# Patient Record
Sex: FEMALE | Race: WHITE | Employment: OTHER | URBAN - METROPOLITAN AREA
[De-identification: names, ages, dates, MRNs, and addresses within clinical notes are randomized per-mention and may not be internally consistent; named-entity substitution may affect disease eponyms.]

---

## 2017-01-06 ENCOUNTER — OFFICE VISIT (OUTPATIENT)
Dept: INTERNAL MEDICINE CLINIC | Age: 72
End: 2017-01-06

## 2017-01-06 VITALS
DIASTOLIC BLOOD PRESSURE: 75 MMHG | WEIGHT: 157 LBS | RESPIRATION RATE: 16 BRPM | HEART RATE: 78 BPM | HEIGHT: 59 IN | SYSTOLIC BLOOD PRESSURE: 134 MMHG | OXYGEN SATURATION: 96 % | TEMPERATURE: 98.4 F | BODY MASS INDEX: 31.65 KG/M2

## 2017-01-06 DIAGNOSIS — E78.1 PURE HYPERGLYCERIDEMIA: ICD-10-CM

## 2017-01-06 DIAGNOSIS — M26.609 TMJ (TEMPOROMANDIBULAR JOINT SYNDROME): ICD-10-CM

## 2017-01-06 DIAGNOSIS — I10 ESSENTIAL HYPERTENSION: Primary | ICD-10-CM

## 2017-01-06 RX ORDER — CYCLOBENZAPRINE HCL 5 MG
5 TABLET ORAL
Qty: 30 TAB | Refills: 0 | Status: SHIPPED | OUTPATIENT
Start: 2017-01-06 | End: 2018-02-16

## 2017-01-06 RX ORDER — CYCLOBENZAPRINE HCL 5 MG
5 TABLET ORAL
Qty: 30 TAB | Refills: 0 | Status: SHIPPED | OUTPATIENT
Start: 2017-01-06 | End: 2017-01-06 | Stop reason: SDUPTHER

## 2017-01-06 NOTE — PATIENT INSTRUCTIONS
Temporomandibular Disorder: Care Instructions  Your Care Instructions    Temporomandibular (TM) disorders are a problem with the muscles and joints that connect your jaw to your skull. They cause pain when you open your mouth, chew, or yawn. You may feel this pain on one or both sides. TM disorders are often caused by tight jaw muscles. The tightness can be caused by clenching or grinding your teeth. This may happen when you have a lot of stress in your life. If you lower your stress, you may be able to stop clenching or grinding your teeth. This will help relax your jaw and reduce your pain. You may also be able to do some things at home to feel better. But if none of this works, your doctor may prescribe medicine to help relax your muscles and control the pain. Follow-up care is a key part of your treatment and safety. Be sure to make and go to all appointments, and call your doctor if you are having problems. It's also a good idea to know your test results and keep a list of the medicines you take. How can you care for yourself at home? · Put a warm, moist cloth or heating pad set on low on your jaw. Do this for 10 to 20 minutes at a time. Put a thin cloth between the heating pad and your skin. · Avoid hard or chewy foods that cause your jaws to work very hard. Examples include popcorn, jerky, tough meats, chewy breads, gum, and raw apples and carrots. · Choose softer foods that are easy to chew. These include eggs, yogurt, and soup. · Cut your food into small pieces. Chew slowly. · If your jaw gets too painful to chew, or if it locks, you may need to puree your food for a few days or weeks. · To relax your jaw, repeat this exercise for a few minutes every morning and evening. Watch yourself in a mirror. Gently open and close your mouth. Move your jaw straight up and down. But don't do this if it makes your pain worse. · Get at least 30 minutes of exercise on most days of the week to relieve stress. Walking is a good choice. You also may want to do other activities, such as running, swimming, cycling, or playing tennis or team sports. · Do not:  ¨ Hold a phone between your shoulder and your jaw. ¨ Open your mouth all the way, like when you sing loudly or yawn. ¨ Clench or grind your teeth, bite your lips, or chew your fingernails. ¨ Clench things such as pens, pipes, or cigars between your teeth. When should you call for help? Call your doctor now or seek immediate medical care if:  · Your jaw is locked open or shut or it is hard to move your jaw. Watch closely for changes in your health, and be sure to contact your doctor if:  · Your jaw pain gets worse. · Your face is swollen. · You do not get better as expected. Where can you learn more? Go to http://bennett-sangita.info/. Enter S749 in the search box to learn more about \"Temporomandibular Disorder: Care Instructions. \"  Current as of: August 9, 2016  Content Version: 11.1  © 0066-9166 [employer], Incorporated. Care instructions adapted under license by [employer] (which disclaims liability or warranty for this information). If you have questions about a medical condition or this instruction, always ask your healthcare professional. [address] any warranty or liability for your use of this information.

## 2017-01-06 NOTE — MR AVS SNAPSHOT
Visit Information Date & Time Provider Department Dept. Phone Encounter #  
 1/6/2017 11:00 AM [facility] [phone] [id number] Your Appointments 6/14/2017 10:40 AM  
Follow Up with MD [facility] [address] [facility]) Appt Note: 6 mo F/U KU 12/14 [address] [facility] [address]  
[phone]  
  
   
 [address] [address] Upcoming Health Maintenance Date Due FOBT Q 1 YEAR AGE 50-75 11/21/1996 GLAUCOMA SCREENING Q2Y 11/21/2011 Pneumococcal 65+ Low/Medium Risk (2 of 2 - PPSV23) 9/21/2017 MEDICARE YEARLY EXAM 9/22/2017 BREAST CANCER SCRN MAMMOGRAM 3/31/2018 DTaP/Tdap/Td series (2 - Td) 11/17/2024 Allergies as of 1/6/2017  Review Complete On: 1/6/2017 By: Seth Ramos MD  
  
 Severity Noted Reaction Type Reactions Prednisone  11/09/2016    Other (comments) Shakiness and headache Current Immunizations  Never Reviewed Name Date Tdap 11/17/2014 Not reviewed this visit You Were Diagnosed With   
  
 Codes Comments Essential hypertension    -  Primary ICD-10-CM: I10 
ICD-9-CM: 401.9 Pure hyperglyceridemia     ICD-10-CM: E78.1 ICD-9-CM: 272.1 TMJ (temporomandibular joint syndrome)     ICD-10-CM: W21.965 ICD-9-CM: 524.60 Vitals BP Pulse Temp Resp Height(growth percentile) Weight(growth percentile) 134/75 (BP 1 Location: Right arm, BP Patient Position: Sitting) 78 98.4 °F (36.9 °C) (Oral) 16 4' 11\" (1.499 m) 157 lb (71.2 kg) SpO2 BMI OB Status Smoking Status 96% 31.71 kg/m2 Postmenopausal Current Every Day Smoker Vitals History BMI and BSA Data Body Mass Index Body Surface Area 31.71 kg/m 2 1.72 m 2 Preferred Pharmacy Pharmacy Name Phone  [address], [address] [phone] Your Updated Medication List  
  
   
This list is accurate as of: 1/6/17 11:50 AM.  Always use your most recent med list.  
  
  
  
  
 albuterol 90 mcg/actuation inhaler Commonly known as:  PROAIR HFA Take 1 Puff by inhalation every four (4) hours as needed for Wheezing or Shortness of Breath. cyclobenzaprine 5 mg tablet Commonly known as:  FLEXERIL Take 1 Tab by mouth three (3) times daily as needed for Muscle Spasm(s). dilTIAZem  mg ER capsule Commonly known as:  CARTIA XT Take 1 Cap by mouth daily. lamoTRIgine 100 mg tablet Commonly known as: LaMICtal  
Take 1 Tab by mouth two (2) times a day. simvastatin 20 mg tablet Commonly known as:  ZOCOR  
TAKE 1 TABLET BY MOUTH NIGHTLY Prescriptions Sent to Pharmacy Refills  
 cyclobenzaprine (FLEXERIL) 5 mg tablet 0 Sig: Take 1 Tab by mouth three (3) times daily as needed for Muscle Spasm(s). Class: Normal  
 Pharmacy: [address] [place] [address] #: [phone] Route: Oral  
  
Patient Instructions Temporomandibular Disorder: Care Instructions Your Care Instructions Temporomandibular (TM) disorders are a problem with the muscles and joints that connect your jaw to your skull. They cause pain when you open your mouth, chew, or yawn. You may feel this pain on one or both sides. TM disorders are often caused by tight jaw muscles. The tightness can be caused by clenching or grinding your teeth. This may happen when you have a lot of stress in your life. If you lower your stress, you may be able to stop clenching or grinding your teeth. This will help relax your jaw and reduce your pain. You may also be able to do some things at home to feel better. But if none of this works, your doctor may prescribe medicine to help relax your muscles and control the pain. Follow-up care is a key part of your treatment and safety. Be sure to make and go to all appointments, and call your doctor if you are having problems. It's also a good idea to know your test results and keep a list of the medicines you take. How can you care for yourself at home? · Put a warm, moist cloth or heating pad set on low on your jaw. Do this for 10 to 20 minutes at a time. Put a thin cloth between the heating pad and your skin. · Avoid hard or chewy foods that cause your jaws to work very hard. Examples include popcorn, jerky, tough meats, chewy breads, gum, and raw apples and carrots. · Choose softer foods that are easy to chew. These include eggs, yogurt, and soup. · Cut your food into small pieces. Chew slowly. · If your jaw gets too painful to chew, or if it locks, you may need to puree your food for a few days or weeks. · To relax your jaw, repeat this exercise for a few minutes every morning and evening. Watch yourself in a mirror. Gently open and close your mouth. Move your jaw straight up and down. But don't do this if it makes your pain worse. · Get at least 30 minutes of exercise on most days of the week to relieve stress. Walking is a good choice. You also may want to do other activities, such as running, swimming, cycling, or playing tennis or team sports. · Do not: 
¨ Hold a phone between your shoulder and your jaw. ¨ Open your mouth all the way, like when you sing loudly or yawn. ¨ Clench or grind your teeth, bite your lips, or chew your fingernails. ¨ Clench things such as pens, pipes, or cigars between your teeth. When should you call for help? Call your doctor now or seek immediate medical care if: 
· Your jaw is locked open or shut or it is hard to move your jaw. Watch closely for changes in your health, and be sure to contact your doctor if: 
· Your jaw pain gets worse. · Your face is swollen. · You do not get better as expected. Where can you learn more? Go to http://bennett-sangita.info/. Enter Q782 in the search box to learn more about \"Temporomandibular Disorder: Care Instructions. \" Current as of: August 9, 2016 Content Version: 11.1 © 4115-9049 [employer]. Care instructions adapted under license by [facility] (which disclaims liability or warranty for this information). If you have questions about a medical condition or this instruction, always ask your healthcare professional. [address] any warranty or liability for your use of this information. Introducing [facility]! [employer] introduces [facility] patient portal. Now you can access parts of your medical record, email your doctor's office, and request medication refills online. 1. In your internet browser, go to [email or web address] 2. Click on the First Time User? Click Here link in the Sign In box. You will see the New Member Sign Up page. 3. Enter your [facility] Access Code exactly as it appears below. You will not need to use this code after youve completed the sign-up process. If you do not sign up before the expiration date, you must request a new code. · [facility] Access Code: [email or web address] Expires: 4/6/2017 11:50 AM 
 
4. Enter the last four digits of your Social Security Number (xxxx) and Date of Birth (mm/dd/yyyy) as indicated and click Submit. You will be taken to the next sign-up page. 5. Create a Synk[employer] ID. This will be your [facility] login ID and cannot be changed, so think of one that is secure and easy to remember. 6. Create a [facility] password. You can change your password at any time. 7. Enter your Password Reset Question and Answer. This can be used at a later time if you forget your password. 8. Enter your e-mail address. You will receive e-mail notification when new information is available in [address] 9. Click Sign Up.  You can now view and download portions of your medical record. 10. Click the Download Summary menu link to download a portable copy of your medical information. If you have questions, please visit the Frequently Asked Questions section of the [employer] website. Remember, [employer] is NOT to be used for urgent needs. For medical emergencies, dial 911. Now available from your iPhone and Android! Please provide this summary of care documentation to your next provider. Your primary care clinician is listed as Dee Astorga. If you have any questions after today's visit, please call [phone].

## 2017-01-06 NOTE — PROGRESS NOTES
HISTORY OF PRESENT ILLNESS  Thomas Coats is a 80 y.o. female. HPI   Cardiovascular Review  The patient has hypertension and hyperlipidemia. She reports taking medications as instructed, no medication side effects noted, no dyspnea on exertion, no swelling of ankles, no palpitations, no muscle aches or pain. Diet and Lifestyle: generally follows a low fat low cholesterol diet, generally follows a low sodium diet, sedentary, smoker she is a current smoker. Lab review: most recent lipid panel reviewed, showing LDL result meets goal, labs reviewed and discussed with patient. She went to the dentist and had left sided jaw pain last week. She was told to apply warm compresses to her jaw. She notes that the discomfort has continued. She notes that she has previously clenched her teeth when sleeping. She has done this for many years. Patient Active Problem List   Diagnosis Code    HTN (hypertension) I10    Tachyarrhythmia R00.0    Mitral valve prolapse I34.1    Hyperlipidemia E78.5    Obesity (BMI 30.0-34. 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 Authorizing Provider   cyclobenzaprine (FLEXERIL) 5 mg tablet Take 1 Tab by mouth three (3) times daily as needed for Muscle Spasm(s). Indications: MUSCLE SPASM 1/6/17  Yes Huber Dsouza MD   lamoTRIgine (LAMICTAL) 100 mg tablet Take 1 Tab by mouth two (2) times a day. 12/14/16  Yes Eliza Dasilva MD   simvastatin (ZOCOR) 20 mg tablet TAKE 1 TABLET BY MOUTH NIGHTLY 12/4/16  Yes Huber Dsouza MD   diltiazem CD (CARTIA XT) 120 mg ER capsule Take 1 Cap by mouth daily. 8/5/16  Yes Huber Dsouza MD   albuterol [facility] HFA) 90 mcg/actuation inhaler Take 1 Puff by inhalation every four (4) hours as needed for Wheezing or Shortness of Breath. 8/5/16  Yes Huber Dsouza MD       Review of Systems   Constitutional: Negative.     HENT:        Jaw pain as per HPI Respiratory: Negative. Cardiovascular: Negative. Genitourinary: Negative. Visit Vitals    /75 (BP 1 Location: Right arm, BP Patient Position: Sitting)    Pulse 78    Temp 98.4 °F (36.9 °C) (Oral)    Resp 16    Ht 4' 11\" (1.499 m)    Wt 157 lb (71.2 kg)    SpO2 96%    BMI 31.71 kg/m2       Physical Exam   Constitutional: She appears well-developed and well-nourished. No distress. HENT:   Head:       Tenderness and mild swelling noted at the left mandibular joint. Cardiovascular: Normal rate, regular rhythm and normal heart sounds. Pulmonary/Chest: Effort normal and breath sounds normal.       ASSESSMENT and PLAN  Caitlin Oden was seen today for hypertension. Diagnoses and all orders for this visit:    Essential hypertension    Pure hyperglyceridemia    TMJ (temporomandibular joint syndrome)  -     cyclobenzaprine (FLEXERIL) 5 mg tablet; Take 1 Tab by mouth three (3) times daily as needed for Muscle Spasm(s). Indications: MUSCLE SPASM       Advised the patient to call back or return to office if symptoms worsen/change/persist.   Discussed expected course/resolution/complications of diagnosis in detail with patient. Medication risks/benefits/costs/interactions/alternatives discussed with patient. The patient was given an after visit summary which includes diagnoses, current medications, & vitals. They expressed understanding with the diagnosis and plan.

## 2017-01-31 DIAGNOSIS — E78.2 MIXED HYPERLIPIDEMIA: ICD-10-CM

## 2017-01-31 RX ORDER — DILTIAZEM HYDROCHLORIDE 120 MG/1
CAPSULE, EXTENDED RELEASE ORAL
Qty: 90 CAP | Refills: 1 | Status: SHIPPED | OUTPATIENT
Start: 2017-01-31 | End: 2017-07-26 | Stop reason: SDUPTHER

## 2017-01-31 RX ORDER — SIMVASTATIN 20 MG/1
TABLET, FILM COATED ORAL
Qty: 90 TAB | Refills: 1 | Status: SHIPPED | OUTPATIENT
Start: 2017-01-31 | End: 2017-07-26 | Stop reason: SDUPTHER

## 2017-03-05 ENCOUNTER — IP HISTORICAL/CONVERTED ENCOUNTER (OUTPATIENT)
Dept: OTHER | Age: 72
End: 2017-03-05

## 2017-03-09 ENCOUNTER — TELEPHONE (OUTPATIENT)
Dept: INTERNAL MEDICINE CLINIC | Age: 72
End: 2017-03-09

## 2017-03-09 NOTE — TELEPHONE ENCOUNTER
Pt was in [address] [place] [address] for 3 days and would like to discuss with the DR the med's she was given.  She would like to talk to [facility] and see what he thinks about what the DR said to her

## 2017-03-09 NOTE — TELEPHONE ENCOUNTER
Patient calls back to inform she was in [address] [place] [address] 2 days ago and was kept overnight. She was given an rx for Symbicort, Prednisone which causes shakes, given  Albuterol / neb treatment rx . Patient will need clarification on medications.  Pt scheduled for tomorrow

## 2017-03-10 ENCOUNTER — OFFICE VISIT (OUTPATIENT)
Dept: INTERNAL MEDICINE CLINIC | Age: 72
End: 2017-03-10

## 2017-03-10 VITALS
WEIGHT: 163.4 LBS | HEIGHT: 59 IN | HEART RATE: 65 BPM | SYSTOLIC BLOOD PRESSURE: 140 MMHG | OXYGEN SATURATION: 95 % | BODY MASS INDEX: 32.94 KG/M2 | TEMPERATURE: 98.9 F | RESPIRATION RATE: 18 BRPM | DIASTOLIC BLOOD PRESSURE: 90 MMHG

## 2017-03-10 DIAGNOSIS — J44.9 CHRONIC OBSTRUCTIVE PULMONARY DISEASE, UNSPECIFIED COPD TYPE (HCC): Primary | ICD-10-CM

## 2017-03-10 RX ORDER — METHYLPREDNISOLONE 4 MG/1
4 TABLET ORAL
Qty: 1 DOSE PACK | Refills: 0 | Status: SHIPPED | OUTPATIENT
Start: 2017-03-10 | End: 2017-04-05 | Stop reason: ALTCHOICE

## 2017-03-10 RX ORDER — METHYLPREDNISOLONE 4 MG/1
4 TABLET ORAL
Qty: 1 DOSE PACK | Refills: 0 | Status: SHIPPED | OUTPATIENT
Start: 2017-03-10 | End: 2017-03-10 | Stop reason: CLARIF

## 2017-03-10 NOTE — PROGRESS NOTES
Follow Up Visit    Yarelis Reno is a 80 y.o. female. she presents for Hospital Follow Up    Transition Care Management:    Admission: 3/4/17  Discharge: 3/7/17  Location: [facility]  Diagnosis:  COPD exacerbation    The patient presents for follow up from a recent hospitalization. She developed an exacerbation of COPD at that time. Given IV solumedrol and nebulizer therapy in the hospital.  This was eventually transitioned to oral prednisone, Dulera and Symbicort. She was discharged to home with these medications in prescription but she did not obtain them nor has she used any of the medications. She has felt stable since discharge with mild cough and sputum production. Otherwise, she comes to discuss further management. Patient Active Problem List   Diagnosis Code    HTN (hypertension) I10    Tachyarrhythmia R00.0    Mitral valve prolapse I34.1    Hyperlipidemia E78.5    Obesity (BMI 30.0-34. 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 Authorizing Provider   simvastatin (ZOCOR) 20 mg tablet TAKE 1 TABLET EVERY NIGHT 1/31/17  Yes Justin Hoskins MD   CARTIA  mg ER capsule TAKE 1 CAPSULE EVERY DAY 1/31/17  Yes Justin Hoskins MD   lamoTRIgine (LAMICTAL) 100 mg tablet Take 1 Tab by mouth two (2) times a day. 12/14/16  Yes Hamida Lazar MD   albuterol (PROAIR HFA) 90 mcg/actuation inhaler Take 1 Puff by inhalation every four (4) hours as needed for Wheezing or Shortness of Breath. 8/5/16  Yes Justin Hoskins MD   cyclobenzaprine (FLEXERIL) 5 mg tablet Take 1 Tab by mouth three (3) times daily as needed for Muscle Spasm(s). Indications:  MUSCLE SPASM 1/6/17   Justin Hoskins MD         Health Maintenance   Topic Date Due    FOBT Q 1 YEAR AGE 50-75  11/21/1996    GLAUCOMA SCREENING Q2Y  11/21/2011    Pneumococcal 65+ Low/Medium Risk (2 of 2 - PPSV23) 09/21/2017    MEDICARE YEARLY EXAM 09/22/2017    BREAST CANCER SCRN MAMMOGRAM  03/31/2018    DTaP/Tdap/Td series (2 - Td) 11/17/2024    Hepatitis C Screening  Completed    OSTEOPOROSIS SCREENING (DEXA)  Completed    ZOSTER VACCINE AGE 60>  Addressed    INFLUENZA AGE 9 TO ADULT  Addressed       Review of Systems   Constitutional: Negative for malaise/fatigue. Respiratory: Positive for cough and sputum production. Negative for shortness of breath and wheezing. Visit Vitals    /90 (BP 1 Location: Right arm, BP Patient Position: Sitting)    Pulse 65    Temp 98.9 °F (37.2 °C) (Oral)    Resp 18    Ht 4' 11\" (1.499 m)    Wt 163 lb 6.4 oz (74.1 kg)    SpO2 95%    BMI 33 kg/m2       Physical Exam   Constitutional: No distress. Cardiovascular: Normal rate and regular rhythm. Pulmonary/Chest: Effort normal and breath sounds normal. She has no wheezes. ASSESSMENT/PLAN    Jenny Alicea was seen today for hospital follow up. Diagnoses and all orders for this visit:    Chronic obstructive pulmonary disease, unspecified COPD type ([facility] [address]- Discussed with the patient to begin [facility] and to add a Medrol taper. She will monitor her breathing and return if not continuing to improve. She has also decreased her smoking significantly. -     mometasone-formoterol (DULERA) 100-5 mcg/actuation HFA inhaler; Take 2 Puffs by inhalation two (2) times a day. -     methylPREDNISolone (MEDROL DOSEPACK) 4 mg tablet; Take 1 Tab by mouth Specific Days and Specific Times. Follow-up Disposition:  Return in about 3 months (around 6/10/2017) for Follow up.

## 2017-03-10 NOTE — MR AVS SNAPSHOT
Visit Information Date & Time Provider Department Dept. Phone Encounter #  
 3/10/2017 10:15 AM [facility] [phone] [id number] Follow-up Instructions Return in about 3 months (around 6/10/2017) for Follow up. Your Appointments 5/5/2017 11:00 AM  
ROUTINE CARE with Joy Irwin MD  
[place] [place] [address] Internal Medicine [facility]) Appt Note: 4-month f/u  
 [address] [facility] access hospital [address]  
[address] [address] [facility] [address] [place] [address]  
  
    
 6/14/2017 10:40 AM  
Follow Up with Elena Winslow MD  
[facility] at [facility] [address] [place] [address]) Appt Note: 6 mo F/U KU 12/14 [address] [facility] [address]  
[phone]  
  
   
 [address] [address] Upcoming Health Maintenance Date Due FOBT Q 1 YEAR AGE 50-75 11/21/1996 GLAUCOMA SCREENING Q2Y 11/21/2011 Pneumococcal 65+ Low/Medium Risk (2 of 2 - PPSV23) 9/21/2017 MEDICARE YEARLY EXAM 9/22/2017 BREAST CANCER SCRN MAMMOGRAM 3/31/2018 DTaP/Tdap/Td series (2 - Td) 11/17/2024 Allergies as of 3/10/2017  Review Complete On: 3/10/2017 By: Joy Irwin MD  
  
 Severity Noted Reaction Type Reactions Prednisone  11/09/2016    Other (comments) Shakiness and headache Current Immunizations  Never Reviewed Name Date Tdap 11/17/2014 Not reviewed this visit You Were Diagnosed With   
  
 Codes Comments Chronic obstructive pulmonary disease, unspecified COPD type ([facility]    -  Primary ICD-10-CM: J44.9 ICD-9-CM: 327 Vitals BP Pulse Temp Resp Height(growth percentile) Weight(growth percentile) 140/90 (BP 1 Location: Right arm, BP Patient Position: Sitting) 65 98.9 °F (37.2 °C) (Oral) 18 4' 11\" (1.499 m) 163 lb 6.4 oz (74.1 kg) SpO2 BMI OB Status Smoking Status 95% 33 kg/m2 Postmenopausal Current Every Day Smoker BMI and BSA Data Body Mass Index Body Surface Area  
 33 kg/m 2 1.76 m 2 Preferred Pharmacy Pharmacy Name Phone [id number], [address] [place] [address] [phone] Your Updated Medication List  
  
   
This list is accurate as of: 3/10/17 10:39 AM.  Always use your most recent med list.  
  
  
  
  
 albuterol 90 mcg/actuation inhaler Commonly known as:  PROAIR HFA Take 1 Puff by inhalation every four (4) hours as needed for Wheezing or Shortness of Breath. CARTIA  mg ER capsule Generic drug:  dilTIAZem CD  
TAKE 1 CAPSULE EVERY DAY  
  
 cyclobenzaprine 5 mg tablet Commonly known as:  FLEXERIL Take 1 Tab by mouth three (3) times daily as needed for Muscle Spasm(s). Indications: MUSCLE SPASM  
  
 lamoTRIgine 100 mg tablet Commonly known as: LaMICtal  
Take 1 Tab by mouth two (2) times a day. methylPREDNISolone 4 mg tablet Commonly known as:  Seferino Glee Take 1 Tab by mouth Specific Days and Specific Times. mometasone-formoterol 100-5 mcg/actuation HFA inhaler Commonly known as:  Clora Schwab Take 2 Puffs by inhalation two (2) times a day. simvastatin 20 mg tablet Commonly known as:  ZOCOR  
TAKE 1 TABLET EVERY NIGHT Prescriptions Sent to Pharmacy Refills  
 mometasone-formoterol (DULERA) 100-5 mcg/actuation HFA inhaler 3 Sig: Take 2 Puffs by inhalation two (2) times a day. Class: Normal  
 Pharmacy: [address] [place] [address] Ph #: [phone] Route: Inhalation  
 methylPREDNISolone (MEDROL DOSEPACK) 4 mg tablet 0 Sig: Take 1 Tab by mouth Specific Days and Specific Times. Class: Normal  
 Pharmacy: [address] [place] [address] Ph #: [phone] Route: Oral  
  
Follow-up Instructions Return in about 3 months (around 6/10/2017) for Follow up. Patient Instructions Chronic Obstructive Pulmonary Disease (COPD) Flare-Ups: Care Instructions Your Care Instructions Chronic obstructive pulmonary disease (COPD) is a lung disease that makes it hard to breathe. It is caused by damage to the lungs over many years, usually from smoking. COPD is often a mix of two diseases: · Chronic bronchitis: The airways that carry air to the lungs (bronchial tubes) get inflamed and make a lot of mucus. This can narrow or block the airways. · Emphysema: In a healthy person, the tiny air sacs in the lungs are like balloons. As you breathe in and out, they get bigger and smaller to move air through your lungs. But with emphysema, these air sacs are damaged and lose their stretch. Less air gets in and out of the lungs. Many people with COPD have attacks called flare-ups or exacerbations. This is when your usual symptoms quickly get worse and stay worse. The doctor has checked you carefully. But problems can develop later. If you notice any problems or new symptoms, get medical treatment right away. Follow-up care is a key part of your treatment and safety. Be sure to make and go to all appointments, and call your doctor if you are having problems. It's also a good idea to know your test results and keep a list of the medicines you take. How can you care for yourself at home? · Be safe with medicines. Take your medicines exactly as prescribed. Call your doctor if you think you are having a problem with your medicine. You may be taking medicines such as: ¨ Bronchodilators. These help open your airways and make breathing easier. ¨ Corticosteroids. These reduce airway inflammation. They may be given as pills, in a vein, or in an inhaled form. You may go home with pills in addition to an inhaler that you already use. · A spacer may help you get more inhaled medicine to your lungs. Ask your doctor or pharmacist if a spacer is right for you.  If it is, ask how to use it properly. · If your doctor prescribed antibiotics, take them as directed. Do not stop taking them just because you feel better. You need to take the full course of antibiotics. · If your doctor prescribed oxygen, use the flow rate your doctor has recommended. Do not change it without talking to your doctor first. 
· Do not smoke. Smoking makes COPD worse. If you need help quitting, talk to your doctor about stop-smoking programs and medicines. These can increase your chances of quitting for good. When should you call for help? Call 911 anytime you think you may need emergency care. For example, call if: 
· You have severe trouble breathing. Call your doctor now or seek immediate medical care if: 
· You have new or worse trouble breathing. · Your coughing or wheezing gets worse. · You cough up dark brown or bloody mucus (sputum). · You have a new or higher fever. Watch closely for changes in your health, and be sure to contact your doctor if: 
· You notice more mucus or a change in the color of your mucus. · You need to use your antibiotic or steroid pills. · You do not get better as expected. Where can you learn more? Go to http://bennett-sangita.info/. Enter N269 in the search box to learn more about \"Chronic Obstructive Pulmonary Disease (COPD) Flare-Ups: Care Instructions. \" Current as of: July 21, 2016 Content Version: 11.1 © 0599-9860 [employer]. Care instructions adapted under license by [employer] (which disclaims liability or warranty for this information). If you have questions about a medical condition or this instruction, always ask your healthcare professional. [place] [address] any warranty or liability for your use of this information. Introducing [facility]!    
 [facility] introduces [employer] patient portal. Now you can access parts of your medical record, email your doctor's office, and request medication refills online. 1. In your internet browser, go to [email or web address] 2. Click on the First Time User? Click Here link in the Sign In box. You will see the New Member Sign Up page. 3. Enter your [employer] Access Code exactly as it appears below. You will not need to use this code after youve completed the sign-up process. If you do not sign up before the expiration date, you must request a new code. · [employer] Access Code: [email or web address] Expires: 4/6/2017 11:50 AM 
 
4. Enter the last four digits of your Social Security Number (xxxx) and Date of Birth (mm/dd/yyyy) as indicated and click Submit. You will be taken to the next sign-up page. 5. Create a [employer] ID. This will be your [employer] login ID and cannot be changed, so think of one that is secure and easy to remember. 6. Create a [employer] password. You can change your password at any time. 7. Enter your Password Reset Question and Answer. This can be used at a later time if you forget your password. 8. Enter your e-mail address. You will receive e-mail notification when new information is available in [address] 9. Click Sign Up. You can now view and download portions of your medical record. 10. Click the Download Summary menu link to download a portable copy of your medical information. If you have questions, please visit the Frequently Asked Questions section of the [employer] website. Remember, [employer] is NOT to be used for urgent needs. For medical emergencies, dial 911. Now available from your iPhone and Android! Please provide this summary of care documentation to your next provider. Your primary care clinician is listed as Shanta Saini. If you have any questions after today's visit, please call [phone].

## 2017-03-10 NOTE — PROGRESS NOTES
Patient was admitted last week Sunday and Discharged on Wednesday 3/8 from [facility] for SOB. Patient states still feeling horrible; she continues to have cough, sob and head aches.

## 2017-03-15 ENCOUNTER — TELEPHONE (OUTPATIENT)
Dept: INTERNAL MEDICINE CLINIC | Age: 72
End: 2017-03-15

## 2017-03-15 NOTE — TELEPHONE ENCOUNTER
The patient still has a runny nose and cough The medication  Dulara is 400.00 a month can you call in something else ?

## 2017-03-17 ENCOUNTER — DOCUMENTATION ONLY (OUTPATIENT)
Dept: INTERNAL MEDICINE CLINIC | Age: 72
End: 2017-03-17

## 2017-03-28 ENCOUNTER — TELEPHONE (OUTPATIENT)
Dept: INTERNAL MEDICINE CLINIC | Age: 72
End: 2017-03-28

## 2017-03-28 NOTE — TELEPHONE ENCOUNTER
Spoke with pt who states she is currently taking Mucinex and still has the same symptoms as described in previous message

## 2017-03-28 NOTE — TELEPHONE ENCOUNTER
Patient is calling with an update. She still has a stuffy/runny nose. She is coughing up green/yellow mucus and it is keeping her up at night. No fever however she does not have some night sweats. Please advise.  If possible she would like a call before noon today at [phone]

## 2017-04-03 NOTE — TELEPHONE ENCOUNTER
Left message for pt to return call to inform per drs recommendations to schedule an appt to discuss possible need for Pulmonary referral

## 2017-04-04 ENCOUNTER — TELEPHONE (OUTPATIENT)
Dept: INTERNAL MEDICINE CLINIC | Age: 72
End: 2017-04-04

## 2017-04-05 ENCOUNTER — OFFICE VISIT (OUTPATIENT)
Dept: INTERNAL MEDICINE CLINIC | Age: 72
End: 2017-04-05

## 2017-04-05 VITALS
HEART RATE: 71 BPM | WEIGHT: 165.2 LBS | TEMPERATURE: 98.1 F | DIASTOLIC BLOOD PRESSURE: 90 MMHG | BODY MASS INDEX: 33.3 KG/M2 | OXYGEN SATURATION: 97 % | SYSTOLIC BLOOD PRESSURE: 128 MMHG | RESPIRATION RATE: 18 BRPM | HEIGHT: 59 IN

## 2017-04-05 DIAGNOSIS — R05.9 COUGH: Primary | ICD-10-CM

## 2017-04-05 RX ORDER — ALBUTEROL SULFATE 90 UG/1
1 AEROSOL, METERED RESPIRATORY (INHALATION)
Qty: 3 INHALER | Refills: 1 | Status: SHIPPED | OUTPATIENT
Start: 2017-04-05 | End: 2018-03-22 | Stop reason: SDUPTHER

## 2017-04-05 NOTE — PROGRESS NOTES
Acute Care Note    Little Wright is 80 y.o. female. she presents for evaluation of Nasal Congestion    COUGH  Wicho Mallory is here to talk about an ongoing cough. This is a follow up of a pre-existing problem problem. Symptoms began 2 weeks ago, gradually improving since that time. The cough is non-productive, with wheezing, with shortness of breath and is aggravated by reclining position. Associated symptoms include:change in voice. She denies: hemoptysis. She has tried tessalon as well as albuterol, and has been somewhat effective. There is a PMH significant for: significant cigarette smoking. Prior to Admission medications    Medication Sig Start Date End Date Taking? Authorizing Provider   albuterol (PROAIR HFA) 90 mcg/actuation inhaler Take 1 Puff by inhalation every four (4) hours as needed for Wheezing or Shortness of Breath. 4/5/17  Yes Daina Peabody, MD   mometasone-formoterol [facility]) 100-5 mcg/actuation HFA inhaler Take 2 Puffs by inhalation two (2) times a day. 3/10/17  Yes Daina Peabody, MD   simvastatin (ZOCOR) 20 mg tablet TAKE 1 TABLET EVERY NIGHT 1/31/17  Yes Daina Peabody, MD   CARTIA  mg ER capsule TAKE 1 CAPSULE EVERY DAY 1/31/17  Yes Daina Peabody, MD   lamoTRIgine (LAMICTAL) 100 mg tablet Take 1 Tab by mouth two (2) times a day. 12/14/16  Yes Los Hu MD   cyclobenzaprine (FLEXERIL) 5 mg tablet Take 1 Tab by mouth three (3) times daily as needed for Muscle Spasm(s). Indications: MUSCLE SPASM 1/6/17   Daina Peabody, MD         Patient Active Problem List   Diagnosis Code    HTN (hypertension) I10    Tachyarrhythmia R00.0    Mitral valve prolapse I34.1    Hyperlipidemia E78.5    Obesity (BMI 30.0-34. 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 Respiratory: Positive for cough. Negative for sputum production. Cardiovascular: Negative.           Visit Vitals    /90 (BP 1 Location: Right arm, BP Patient Position: Sitting)    Pulse 71    Temp 98.1 °F (36.7 °C) (Oral)    Resp 18    Ht 4' 11\" (1.499 m)    Wt 165 lb 3.2 oz (74.9 kg)    SpO2 97%    BMI 33.37 kg/m2       Physical Exam   Constitutional: She appears well-developed and well-nourished. Cardiovascular: Normal rate and regular rhythm. Pulmonary/Chest: Effort normal and breath sounds normal. No respiratory distress. She has no wheezes. ASSESSMENT/PLAN  Lisa Cohen was seen today for nasal congestion. Diagnoses and all orders for this visit:    Cough- Discussed with the patient. She is decreasing her smoking and improving. However I have discussed with her the need to involve pulmonology at this point to aid with future management. She is in agreement.   -     REFERRAL TO PULMONARY DISEASE  -     albuterol (PROAIR HFA) 90 mcg/actuation inhaler; Take 1 Puff by inhalation every four (4) hours as needed for Wheezing or Shortness of Breath. Advised the patient to call back or return to office if symptoms worsen/change/persist.   Discussed expected course/resolution/complications of diagnosis in detail with patient. Medication risks/benefits/costs/interactions/alternatives discussed with patient. The patient was given an after visit summary which includes diagnoses, current medications, & vitals. They expressed understanding with the diagnosis and plan.

## 2017-04-05 NOTE — PATIENT INSTRUCTIONS
Cough: Care Instructions  Your Care Instructions  A cough is your body's response to something that bothers your throat or airways. Many things can cause a cough. You might cough because of a cold or the flu, bronchitis, or asthma. Smoking, postnasal drip, allergies, and stomach acid that backs up into your throat also can cause coughs. A cough is a symptom, not a disease. Most coughs stop when the cause, such as a cold, goes away. You can take a few steps at home to cough less and feel better. Follow-up care is a key part of your treatment and safety. Be sure to make and go to all appointments, and call your doctor if you are having problems. It's also a good idea to know your test results and keep a list of the medicines you take. How can you care for yourself at home? · Drink lots of water and other fluids. This helps thin the mucus and soothes a dry or sore throat. Honey or lemon juice in hot water or tea may ease a dry cough. · Take cough medicine as directed by your doctor. · Prop up your head on pillows to help you breathe and ease a dry cough. · Try cough drops to soothe a dry or sore throat. Cough drops don't stop a cough. Medicine-flavored cough drops are no better than candy-flavored drops or hard candy. · Do not smoke. Avoid secondhand smoke. If you need help quitting, talk to your doctor about stop-smoking programs and medicines. These can increase your chances of quitting for good. When should you call for help? Call 911 anytime you think you may need emergency care. For example, call if:  · You have severe trouble breathing. Call your doctor now or seek immediate medical care if:  · You cough up blood. · You have new or worse trouble breathing. · You have a new or higher fever. · You have a new rash.   Watch closely for changes in your health, and be sure to contact your doctor if:  · You cough more deeply or more often, especially if you notice more mucus or a change in the color of your mucus. · You have new symptoms, such as a sore throat, an earache, or sinus pain. · You do not get better as expected. Where can you learn more? Go to http://bennett-sangita.info/. Enter D279 in the search box to learn more about \"Cough: Care Instructions. \"  Current as of: May 27, 2016  Content Version: 11.2  © 2589-7517 [employer]. Care instructions adapted under license by [employer] (which disclaims liability or warranty for this information). If you have questions about a medical condition or this instruction, always ask your healthcare professional. [address] any warranty or liability for your use of this information.

## 2017-04-05 NOTE — MR AVS SNAPSHOT
Visit Information Date & Time Provider Department Dept. Phone Encounter #  
 4/5/2017  2:45 PM [facility] [phone] [id number] Follow-up Instructions Return in about 3 months (around 7/5/2017) for Medicare Wellness Visit- 30 minute appointment. Your Appointments 6/12/2017 11:00 AM  
ROUTINE CARE with Antonino Montoya MD  
[facility]) Appt Note: 3mo f/u  
 [address]  [address] [facility] [address]  
[address] [id number] [facility] [address] [place] [address]  
  
    
 6/14/2017 10:40 AM  
Follow Up with Mario Alberto Otero MD  
[facility] at [address] [facility]) Appt Note: 6 mo F/U KU 12/14 [address] [facility] [address]  
[phone]  
  
   
 [address]  [address] Upcoming Health Maintenance Date Due FOBT Q 1 YEAR AGE 50-75 11/21/1996 GLAUCOMA SCREENING Q2Y 11/21/2011 Pneumococcal 65+ Low/Medium Risk (2 of 2 - PPSV23) 9/21/2017 MEDICARE YEARLY EXAM 9/22/2017 BREAST CANCER SCRN MAMMOGRAM 3/31/2018 DTaP/Tdap/Td series (2 - Td) 11/17/2024 Allergies as of 4/5/2017  Review Complete On: 4/5/2017 By: Lalo Given Severity Noted Reaction Type Reactions Prednisone  11/09/2016    Other (comments) Shakiness and headache Current Immunizations  Never Reviewed Name Date Tdap 11/17/2014 Not reviewed this visit You Were Diagnosed With   
  
 Codes Comments Cough    -  Primary ICD-10-CM: Z55 ICD-9-CM: 483. 2 Vitals BP Pulse Temp Resp Height(growth percentile) Weight(growth percentile) 128/90 (BP 1 Location: Right arm, BP Patient Position: Sitting) 71 98.1 °F (36.7 °C) (Oral) 18 4' 11\" (1.499 m) 165 lb 3.2 oz (74.9 kg) SpO2 BMI OB Status Smoking Status 97% 33.37 kg/m2 Postmenopausal Current Every Day Smoker BMI and BSA Data Body Mass Index Body Surface Area  
 33.37 kg/m 2 1.77 m 2 Preferred Pharmacy Pharmacy Name Phone [id number] [phone] Your Updated Medication List  
  
   
This list is accurate as of: 4/5/17  3:17 PM.  Always use your most recent med list.  
  
  
  
  
 albuterol 90 mcg/actuation inhaler Commonly known as:  PROAIR HFA Take 1 Puff by inhalation every four (4) hours as needed for Wheezing or Shortness of Breath. CARTIA  mg ER capsule Generic drug:  dilTIAZem CD  
TAKE 1 CAPSULE EVERY DAY  
  
 cyclobenzaprine 5 mg tablet Commonly known as:  FLEXERIL Take 1 Tab by mouth three (3) times daily as needed for Muscle Spasm(s). Indications: MUSCLE SPASM  
  
 lamoTRIgine 100 mg tablet Commonly known as: LaMICtal  
Take 1 Tab by mouth two (2) times a day. mometasone-formoterol 100-5 mcg/actuation HFA inhaler Commonly known as:  Tenna Cody Take 2 Puffs by inhalation two (2) times a day. simvastatin 20 mg tablet Commonly known as:  ZOCOR  
TAKE 1 TABLET EVERY NIGHT We Performed the Following REFERRAL TO PULMONARY DISEASE [BZD48 Custom] Comments:  
 Please evaluate patient for COPD Follow-up Instructions Return in about 3 months (around 7/5/2017) for Medicare Wellness Visit- 30 minute appointment. Referral Information Referral ID Referred By Referred To  
  
 [id number] [facility] [address] [place] [address] [facility] Visits Status Start Date End Date 1 New Request 4/5/17 4/5/18 If your referral has a status of pending review or denied, additional information will be sent to support the outcome of this decision. Patient Instructions Cough: Care Instructions Your Care Instructions A cough is your body's response to something that bothers your throat or airways. Many things can cause a cough. You might cough because of a cold or the flu, bronchitis, or asthma. Smoking, postnasal drip, allergies, and stomach acid that backs up into your throat also can cause coughs. A cough is a symptom, not a disease. Most coughs stop when the cause, such as a cold, goes away. You can take a few steps at home to cough less and feel better. Follow-up care is a key part of your treatment and safety. Be sure to make and go to all appointments, and call your doctor if you are having problems. It's also a good idea to know your test results and keep a list of the medicines you take. How can you care for yourself at home? · Drink lots of water and other fluids. This helps thin the mucus and soothes a dry or sore throat. Honey or lemon juice in hot water or tea may ease a dry cough. · Take cough medicine as directed by your doctor. · Prop up your head on pillows to help you breathe and ease a dry cough. · Try cough drops to soothe a dry or sore throat. Cough drops don't stop a cough. Medicine-flavored cough drops are no better than candy-flavored drops or hard candy. · Do not smoke. Avoid secondhand smoke. If you need help quitting, talk to your doctor about stop-smoking programs and medicines. These can increase your chances of quitting for good. When should you call for help? Call 911 anytime you think you may need emergency care. For example, call if: 
· You have severe trouble breathing. Call your doctor now or seek immediate medical care if: 
· You cough up blood. · You have new or worse trouble breathing. · You have a new or higher fever. · You have a new rash. Watch closely for changes in your health, and be sure to contact your doctor if: 
· You cough more deeply or more often, especially if you notice more mucus or a change in the color of your mucus. · You have new symptoms, such as a sore throat, an earache, or sinus pain. · You do not get better as expected. Where can you learn more? Go to http://bennett-sangita.info/. Enter D279 in the search box to learn more about \"Cough: Care Instructions. \" Current as of: May 27, 2016 Content Version: 11.2 © 6443-6236 [employer]. Care instructions adapted under license by [employer] (which disclaims liability or warranty for this information). If you have questions about a medical condition or this instruction, always ask your healthcare professional. [address] any warranty or liability for your use of this information. Introducing [facility]! Ag Lee introduces [employer] patient portal. Now you can access parts of your medical record, email your doctor's office, and request medication refills online. 1. In your internet browser, go to [email or web address] 2. Click on the First Time User? Click Here link in the Sign In box. You will see the New Member Sign Up page. 3. Enter your [employer] Access Code exactly as it appears below. You will not need to use this code after youve completed the sign-up process. If you do not sign up before the expiration date, you must request a new code. · [employer] Access Code: [email or web address] Expires: 4/6/2017 12:50 PM 
 
4. Enter the last four digits of your Social Security Number (xxxx) and Date of Birth (mm/dd/yyyy) as indicated and click Submit. You will be taken to the next sign-up page. 5. Create a [employer] ID. This will be your Ap[employer] login ID and cannot be changed, so think of one that is secure and easy to remember. 6. Create a [employer] password. You can change your password at any time. 7. Enter your Password Reset Question and Answer. This can be used at a later time if you forget your password. 8. Enter your e-mail address. You will receive e-mail notification when new information is available in [address] 9. Click Sign Up. You can now view and download portions of your medical record. 10. Click the Download Summary menu link to download a portable copy of your medical information. If you have questions, please visit the Frequently Asked Questions section of the [employer] website. Remember, [employer] is NOT to be used for urgent needs. For medical emergencies, dial 911. Now available from your iPhone and Android! Please provide this summary of care documentation to your next provider. Your primary care clinician is listed as Zoey Schroeder. If you have any questions after today's visit, please call [phone].

## 2017-04-13 ENCOUNTER — TELEPHONE (OUTPATIENT)
Dept: INTERNAL MEDICINE CLINIC | Age: 72
End: 2017-04-13

## 2017-06-12 ENCOUNTER — OFFICE VISIT (OUTPATIENT)
Dept: INTERNAL MEDICINE CLINIC | Age: 72
End: 2017-06-12

## 2017-06-12 VITALS
WEIGHT: 163.8 LBS | HEART RATE: 64 BPM | HEIGHT: 59 IN | RESPIRATION RATE: 19 BRPM | SYSTOLIC BLOOD PRESSURE: 130 MMHG | DIASTOLIC BLOOD PRESSURE: 90 MMHG | TEMPERATURE: 97.7 F | OXYGEN SATURATION: 98 % | BODY MASS INDEX: 33.02 KG/M2

## 2017-06-12 DIAGNOSIS — E78.2 MIXED HYPERLIPIDEMIA: ICD-10-CM

## 2017-06-12 DIAGNOSIS — R00.0 INCREASED HEART RATE: Primary | ICD-10-CM

## 2017-06-12 DIAGNOSIS — R00.2 PALPITATION: ICD-10-CM

## 2017-06-12 DIAGNOSIS — I10 ESSENTIAL HYPERTENSION: ICD-10-CM

## 2017-06-12 RX ORDER — BUDESONIDE AND FORMOTEROL FUMARATE DIHYDRATE 160; 4.5 UG/1; UG/1
2 AEROSOL RESPIRATORY (INHALATION) 2 TIMES DAILY
COMMUNITY
End: 2018-10-22 | Stop reason: ALTCHOICE

## 2017-06-12 NOTE — MR AVS SNAPSHOT
Visit Information Date & Time Provider Department Dept. Phone Encounter #  
 6/12/2017 11:00 AM [facility] [id number] Follow-up Instructions Return in about 3 months (around 9/23/2017) for Medicare Wellness Visit- 30 minute appointment. Your Appointments 6/14/2017 10:40 AM  
Follow Up with Los Hu MD  
[facility] at [facility] Appt Note: 6 mo F/U KU 12/14 [address] [place] [address]  
[phone]  
  
   
 [address] [place] [address]  [address] Upcoming Health Maintenance Date Due FOBT Q 1 YEAR AGE 50-75 11/21/1996 GLAUCOMA SCREENING Q2Y 11/21/2011 INFLUENZA AGE 9 TO ADULT 8/1/2017 Pneumococcal 65+ Low/Medium Risk (2 of 2 - PPSV23) 9/21/2017 MEDICARE YEARLY EXAM 9/22/2017 BREAST CANCER SCRN MAMMOGRAM 3/31/2018 DTaP/Tdap/Td series (2 - Td) 11/17/2024 Allergies as of 6/12/2017  Review Complete On: 6/12/2017 By: Daina Peabody, MD  
  
 Severity Noted Reaction Type Reactions Prednisone  11/09/2016    Other (comments) Shakiness and headache Current Immunizations  Never Reviewed Name Date Tdap 11/17/2014 Not reviewed this visit You Were Diagnosed With   
  
 Codes Comments Increased heart rate    -  Primary ICD-10-CM: R00.0 ICD-9-CM: 785.0 Palpitation     ICD-10-CM: R00.2 ICD-9-CM: 785.1 Mixed hyperlipidemia     ICD-10-CM: E78.2 ICD-9-CM: 272.2 Essential hypertension     ICD-10-CM: I10 
ICD-9-CM: 401.9 Vitals BP Pulse Temp Resp Height(growth percentile) Weight(growth percentile) 130/90 (BP 1 Location: Right arm, BP Patient Position: Sitting) 64 97.7 °F (36.5 °C) (Oral) 19 4' 11\" (1.499 m) 163 lb 12.8 oz (74.3 kg) SpO2 BMI OB Status Smoking Status 98% 33.08 kg/m2 Postmenopausal Current Every Day Smoker Vitals History BMI and BSA Data Body Mass Index Body Surface Area 33.08 kg/m 2 1.76 m 2 Preferred Pharmacy Pharmacy Name Phone [facility] [address] [place] [address] [phone] Your Updated Medication List  
  
   
This list is accurate as of: 6/12/17 11:44 AM.  Always use your most recent med list.  
  
  
  
  
 albuterol 90 mcg/actuation inhaler Commonly known as:  PROAIR HFA Take 1 Puff by inhalation every four (4) hours as needed for Wheezing or Shortness of Breath. CARTIA  mg ER capsule Generic drug:  dilTIAZem CD  
TAKE 1 CAPSULE EVERY DAY  
  
 cyclobenzaprine 5 mg tablet Commonly known as:  FLEXERIL Take 1 Tab by mouth three (3) times daily as needed for Muscle Spasm(s). Indications: MUSCLE SPASM  
  
 lamoTRIgine 100 mg tablet Commonly known as: LaMICtal  
Take 1 Tab by mouth two (2) times a day. mometasone-formoterol 100-5 mcg/actuation HFA inhaler Commonly known as:  Blake Michael Take 2 Puffs by inhalation two (2) times a day. simvastatin 20 mg tablet Commonly known as:  ZOCOR  
TAKE 1 TABLET EVERY NIGHT  
  
 SYMBICORT 160-4.5 mcg/actuation HFA inhaler Generic drug:  budesonide-formoterol Take 2 Puffs by inhalation two (2) times a day. We Performed the Following AMB POC EKG ROUTINE W/ 12 LEADS, INTER & REP [29078 CPT(R)] LIPID PANEL [08757 CPT(R)] METABOLIC PANEL, COMPREHENSIVE [70272 CPT(R)] Follow-up Instructions Return in about 3 months (around 9/23/2017) for Medicare Wellness Visit- 30 minute appointment. Patient Instructions Please continue to monitor your heart rate. Also, discuss your trip with Dr. Nathaniel Veliz in light of your seizure history and medication. Introducing [facility]! Bandar Mata introduces [employer] patient portal. Now you can access parts of your medical record, email your doctor's office, and request medication refills online. 1. In your internet browser, go to [email or web address] 2. Click on the First Time User? Click Here link in the Sign In box. You will see the New Member Sign Up page. 3. Enter your B[employer] Access Code exactly as it appears below. You will not need to use this code after youve completed the sign-up process. If you do not sign up before the expiration date, you must request a new code. · B[employer] Access Code: [email or web address] Expires: 9/10/2017 11:44 AM 
 
4. Enter the last four digits of your Social Security Number (xxxx) and Date of Birth (mm/dd/yyyy) as indicated and click Submit. You will be taken to the next sign-up page. 5. Create a WineMeNowt ID. This will be your Billingstreet login ID and cannot be changed, so think of one that is secure and easy to remember. 6. Create a Billingstreet password. You can change your password at any time. 7. Enter your Password Reset Question and Answer. This can be used at a later time if you forget your password. 8. Enter your e-mail address. You will receive e-mail notification when new information is available in [address] 9. Click Sign Up. You can now view and download portions of your medical record. 10. Click the Download Summary menu link to download a portable copy of your medical information. If you have questions, please visit the Frequently Asked Questions section of the Billingstreet website. Remember, B[employer]street is NOT to be used for urgent needs. For medical emergencies, dial 911. Now available from your iPhone and Android! Please provide this summary of care documentation to your next provider. Your primary care clinician is listed as Christiano Galindo. If you have any questions after today's visit, please call [phone].

## 2017-06-12 NOTE — PROGRESS NOTES
Follow Up Visit    Renaldo Pratt is a 80 y.o. female. she presents for Hypertension    Cardiovascular Review  The patient has hypertension. She reports taking medications as instructed, no medication side effects noted, no swelling of ankles, no muscle aches or pain. Diet and Lifestyle: generally follows a low fat low cholesterol diet, generally follows a low sodium diet, sedentary, smoker (1/2 pack per day). Lab review: orders written for new lab studies as appropriate; see orders. Palpitations  Patient complains of palpitations. The symptoms are of moderate severity, occuring intermittent and lasting a few seconds per episode. Cardiac risk factors include: smoking/ tobacco exposure, dyslipidemia, sedentary life style. Aggravating factors: stress/anxiety (her brother is ill and she is planning a trip to see him). Relieving factors: spontaneous. Associated signs and symptoms: none        Patient Active Problem List   Diagnosis Code    HTN (hypertension) I10    Tachyarrhythmia R00.0    Mitral valve prolapse I34.1    Hyperlipidemia E78.5    Obesity (BMI 30.0-34. 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 Authorizing Provider   budesonide-formoterol (SYMBICORT) 160-4.5 mcg/actuation HFA inhaler Take 2 Puffs by inhalation two (2) times a day. Yes Historical Provider   albuterol (PROAIR HFA) 90 mcg/actuation inhaler Take 1 Puff by inhalation every four (4) hours as needed for Wheezing or Shortness of Breath. 4/5/17  Yes Naomi Pandey MD   simvastatin (ZOCOR) 20 mg tablet TAKE 1 TABLET EVERY NIGHT 1/31/17  Yes Naomi Pandey MD   CARTIA  mg ER capsule TAKE 1 CAPSULE EVERY DAY 1/31/17  Yes Naomi Pandey MD   cyclobenzaprine (FLEXERIL) 5 mg tablet Take 1 Tab by mouth three (3) times daily as needed for Muscle Spasm(s). Indications:  MUSCLE SPASM 1/6/17  Yes Naomi Pandey MD   lamoTRIgine (LAMICTAL) 100 mg tablet Take 1 Tab by mouth two (2) times a day. 12/14/16  Yes Claude Han MD   mometasone-formoterol (DULERA) 100-5 mcg/actuation HFA inhaler Take 2 Puffs by inhalation two (2) times a day. 3/10/17   Alvaro Martinez MD         Health Maintenance   Topic Date Due    FOBT Q 1 YEAR AGE 50-75  11/21/1996    GLAUCOMA SCREENING Q2Y  11/21/2011    INFLUENZA AGE 9 TO ADULT  08/01/2017    Pneumococcal 65+ Low/Medium Risk (2 of 2 - PPSV23) 09/21/2017    MEDICARE YEARLY EXAM  09/22/2017    BREAST CANCER SCRN MAMMOGRAM  03/31/2018    DTaP/Tdap/Td series (2 - Td) 11/17/2024    Hepatitis C Screening  Completed    OSTEOPOROSIS SCREENING (DEXA)  Completed    ZOSTER VACCINE AGE 60>  Addressed       Review of Systems   Constitutional: Negative. Respiratory: Negative. Cardiovascular: Negative. Gastrointestinal: Negative. Visit Vitals    /90 (BP 1 Location: Right arm, BP Patient Position: Sitting)    Pulse 64    Temp 97.7 °F (36.5 °C) (Oral)    Resp 19    Ht 4' 11\" (1.499 m)    Wt 163 lb 12.8 oz (74.3 kg)    SpO2 98%    BMI 33.08 kg/m2       Physical Exam   Constitutional: She appears well-developed and well-nourished. Cardiovascular: Normal rate and regular rhythm. Pulmonary/Chest: Effort normal and breath sounds normal. She has no wheezes. EKG: sinus bradycardia, no signs of ischemia. ASSESSMENT/PLAN    Kobe Foley was seen today for hypertension.     Diagnoses and all orders for this visit:    Increased heart rate- EKG appears normal.    -     AMB POC EKG ROUTINE W/ 12 LEADS, INTER & REP    Palpitation- Advised monitoring of this symptom and minimizing use of caffeinated drinks (coffee, tea, etc.)  If worsened, the patient will return to clinic and we will consider cardiology evaluation      Mixed hyperlipidemia  -     METABOLIC PANEL, COMPREHENSIVE  -     LIPID PANEL    Essential hypertension      Follow-up Disposition:  Return in about 3 months (around 9/23/2017) for Medicare Wellness Visit- 30 minute appointment.

## 2017-06-12 NOTE — PATIENT INSTRUCTIONS
Please continue to monitor your heart rate. Also, discuss your trip with Dr. Culp Said in light of your seizure history and medication.

## 2017-06-14 ENCOUNTER — OFFICE VISIT (OUTPATIENT)
Dept: NEUROLOGY | Age: 72
End: 2017-06-14

## 2017-06-14 VITALS
RESPIRATION RATE: 14 BRPM | DIASTOLIC BLOOD PRESSURE: 82 MMHG | SYSTOLIC BLOOD PRESSURE: 130 MMHG | HEART RATE: 101 BPM | OXYGEN SATURATION: 97 % | HEIGHT: 59 IN | BODY MASS INDEX: 33.06 KG/M2 | WEIGHT: 164 LBS

## 2017-06-14 DIAGNOSIS — M54.81 OCCIPITAL NEURALGIA OF RIGHT SIDE: Primary | ICD-10-CM

## 2017-06-14 DIAGNOSIS — H81.391 PERIPHERAL VERTIGO, RIGHT: ICD-10-CM

## 2017-06-14 NOTE — PATIENT INSTRUCTIONS

## 2017-06-14 NOTE — PROGRESS NOTES
Chief Complaint   Patient presents with    Seizure         HISTORY OF PRESENT ILLNESS  Kelsie Alston came back for follow up. No further seizures. Pain related to occipital neuralgia comes back every now and then, but is not as bad. She is taking lamotrigine 100 mg twice a day. She had an EEG for evaluation of spells where she had alteration of awareness and could not respond to people. EEG showed epileptiform activity intermittently out of the left temporal head region. She was also having occasional weakness in the right upper and lower extremity. She has never had any witnessed generalized convulsion. 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 This was stable on her last scan compared to the one 2 years prior. She reports a new symptom of dizziness off and on especially when she moves her head to one side or when she turns over while laying down. Thinks shift in front of her eyes for a brief moment and then subside. No changes in vision, diplopia, trouble swallowing, focal motor or sensory deficits, balance problem. History reviewed. No pertinent past medical history. Current Outpatient Prescriptions   Medication Sig    budesonide-formoterol (SYMBICORT) 160-4.5 mcg/actuation HFA inhaler Take 2 Puffs by inhalation two (2) times a day.  albuterol (PROAIR HFA) 90 mcg/actuation inhaler Take 1 Puff by inhalation every four (4) hours as needed for Wheezing or Shortness of Breath.  mometasone-formoterol (DULERA) 100-5 mcg/actuation HFA inhaler Take 2 Puffs by inhalation two (2) times a day.  simvastatin (ZOCOR) 20 mg tablet TAKE 1 TABLET EVERY NIGHT    CARTIA  mg ER capsule TAKE 1 CAPSULE EVERY DAY    lamoTRIgine (LAMICTAL) 100 mg tablet Take 1 Tab by mouth two (2) times a day.  cyclobenzaprine (FLEXERIL) 5 mg tablet Take 1 Tab by mouth three (3) times daily as needed for Muscle Spasm(s). Indications:  MUSCLE SPASM     No current facility-administered medications for this visit. PHYSICAL EXAMINATION:    Visit Vitals    /82    Pulse (!) 101    Resp 14    Ht 4' 11\" (1.499 m)    Wt 74.4 kg (164 lb)    SpO2 97%    BMI 33.12 kg/m2       NEUROLOGICAL EXAMINATION:     Mental Status:   Alert and oriented to person, place, and time with recent and remote memory intact. Attention span and concentration are normal. Speech is fluent with a full fund of knowledge. Cranial Nerves:    II, III, IV, VI:  Visual acuity grossly intact. Visual fields are normal.    Pupils are equal, round, and reactive to light and accommodation. Extra-ocular movements are full and fluid. Fundoscopic exam was benign, no ptosis or nystagmus. V-XII: Hearing is grossly intact. Facial features are symmetric, with normal sensation and strength. The palate rises symmetrically and the tongue protrudes midline. Sternocleidomastoids 5/5. Motor Examination: Slight weakness of the  strength on the right and slight weakness of the great toe extension on the right. Normal tone, bulk, and strength on the left side. No cogwheel rigidity or clonus present. Sensory exam:  Normal throughout to pinprick, temperature, and vibration sense. Normal proprioception. Coordination:  Heel-to-shin was smooth and symmetrical bilaterally. Finger to nose and rapid arm movement testing was normal.   No resting or intention tremor    Gait and Station:  Steady while walking on toes, heels, and with tandem walking. Normal arm swing. No Rhomberg or pronator drift. No muscle wasting or fasiculations noted. Reflexes:  DTRs 2+ throughout. Toes downgoing.         LABS / IMAGING  Lab Results   Component Value Date/Time    Sodium 141 10/18/2016 07:23 AM    Potassium 3.8 10/18/2016 07:23 AM    Chloride 101 10/18/2016 07:23 AM    CO2 26 10/18/2016 07:23 AM    Anion gap 8 09/22/2010 03:33 PM    Glucose 98 10/18/2016 07:23 AM    BUN 21 10/18/2016 07:23 AM Creatinine 0.83 10/18/2016 07:23 AM    BUN/Creatinine ratio 25 10/18/2016 07:23 AM    GFR est AA 83 10/18/2016 07:23 AM    GFR est non-AA 72 10/18/2016 07:23 AM    Calcium 8.8 10/18/2016 07:23 AM    Bilirubin, total 0.3 10/18/2016 07:23 AM    AST (SGOT) 17 10/18/2016 07:23 AM    Alk. phosphatase 79 10/18/2016 07:23 AM    Protein, total 6.2 10/18/2016 07:23 AM    Albumin 3.9 10/18/2016 07:23 AM    Globulin 3.0 09/22/2010 03:33 PM    A-G Ratio 1.7 10/18/2016 07:23 AM    ALT (SGPT) 16 10/18/2016 07:23 AM     Lab Results   Component Value Date/Time    WBC 11.8 10/18/2016 07:23 AM    HGB 13.9 10/18/2016 07:23 AM    HCT 40.9 10/18/2016 07:23 AM    PLATELET 660 73/25/9595 07:23 AM    MCV 91 10/18/2016 07:23 AM     Lamotrigine level was 5.1    ASSESSMENT    ICD-10-CM ICD-9-CM    1. Occipital neuralgia of right side M54.81 723.8 LAMOTRIGINE (LAMICTAL)   2. Peripheral vertigo, right H81.391 386.10        DISCUSSION  Ms. Demetrio Kerns has had episodes where she could not respond to move. These may have been partial complex seizures given abnormal EEG. She should continue lamotrigine 100 mg twice a day. Check levels. She has not had any spells in over 2 years. She is planning to drive to [place] to see her brother who is not in good health. She may drive but I discussed with her the importance of taking frequent breaks, staying well-hydrated and getting adequate sleep. She has mild peripheral vertigo. Modified Epley exercises were performed in the office and she may continue to do it on her own. 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 Scans have been stable over the past 2-3 years.       Follow up in 6 months     Silvia Green MD  Diplomate, American Board of Psychiatry & Neurology (Neurology)  [place] Board of Psychiatry & Neurology (Clinical Neurophysiology)  Diplomate, American Board of Electrodiagnostic Medicine

## 2017-06-14 NOTE — MR AVS SNAPSHOT
Visit Information Date & Time Provider Department Dept. Phone Encounter #  
 6/14/2017 10:40 AM Aviva Canela MD [facility] at [address] [id number] Follow-up Instructions Return in about 6 months (around 12/14/2017). Upcoming Health Maintenance Date Due FOBT Q 1 YEAR AGE 50-75 11/21/1996 GLAUCOMA SCREENING Q2Y 11/21/2011 INFLUENZA AGE 9 TO ADULT 8/1/2017 Pneumococcal 65+ Low/Medium Risk (2 of 2 - PPSV23) 9/21/2017 MEDICARE YEARLY EXAM 9/22/2017 BREAST CANCER SCRN MAMMOGRAM 3/31/2018 DTaP/Tdap/Td series (2 - Td) 11/17/2024 Allergies as of 6/14/2017  Review Complete On: 6/14/2017 By: Aviva Canela MD  
  
 Severity Noted Reaction Type Reactions Prednisone  11/09/2016    Other (comments) Shakiness and headache Current Immunizations  Never Reviewed Name Date Tdap 11/17/2014 Not reviewed this visit You Were Diagnosed With   
  
 Codes Comments Occipital neuralgia of right side    -  Primary ICD-10-CM: M54.81 ICD-9-CM: 723.8 Peripheral vertigo, right     ICD-10-CM: H81.391 ICD-9-CM: 386.10 Vitals BP Pulse Resp Height(growth percentile) Weight(growth percentile) SpO2  
 130/82 (!) 101 14 4' 11\" (1.499 m) 164 lb (74.4 kg) 97% BMI OB Status Smoking Status 33.12 kg/m2 Postmenopausal Current Every Day Smoker Vitals History BMI and BSA Data Body Mass Index Body Surface Area  
 33.12 kg/m 2 1.76 m 2 Preferred Pharmacy Pharmacy Name Phone [address] [place] [address] [phone] Your Updated Medication List  
  
   
This list is accurate as of: 6/14/17 11:05 AM.  Always use your most recent med list.  
  
  
  
  
 albuterol 90 mcg/actuation inhaler Commonly known as:  PROAIR HFA Take 1 Puff by inhalation every four (4) hours as needed for Wheezing or Shortness of Breath. CARTIA  mg ER capsule Generic drug:  dilTIAZem CD  
TAKE 1 CAPSULE EVERY DAY  
  
 cyclobenzaprine 5 mg tablet Commonly known as:  FLEXERIL Take 1 Tab by mouth three (3) times daily as needed for Muscle Spasm(s). Indications: MUSCLE SPASM  
  
 lamoTRIgine 100 mg tablet Commonly known as: LaMICtal  
Take 1 Tab by mouth two (2) times a day. mometasone-formoterol 100-5 mcg/actuation HFA inhaler Commonly known as:  Arch Balling Take 2 Puffs by inhalation two (2) times a day. simvastatin 20 mg tablet Commonly known as:  ZOCOR  
TAKE 1 TABLET EVERY NIGHT  
  
 SYMBICORT 160-4.5 mcg/actuation HFA inhaler Generic drug:  budesonide-formoterol Take 2 Puffs by inhalation two (2) times a day. We Performed the Following LAMOTRIGINE (LAMICTAL) [74654 CPT(R)] Follow-up Instructions Return in about 6 months (around 12/14/2017). Patient Instructions A Healthy Lifestyle: Care Instructions Your Care Instructions A healthy lifestyle can help you feel good, stay at a healthy weight, and have plenty of energy for both work and play. A healthy lifestyle is something you can share with your whole family. A healthy lifestyle also can lower your risk for serious health problems, such as high blood pressure, heart disease, and diabetes. You can follow a few steps listed below to improve your health and the health of your family. Follow-up care is a key part of your treatment and safety. Be sure to make and go to all appointments, and call your doctor if you are having problems. Its also a good idea to know your test results and keep a list of the medicines you take. How can you care for yourself at home? · Do not eat too much sugar, fat, or fast foods. You can still have dessert and treats now and then. The goal is moderation. · Start small to improve your eating habits.  Pay attention to portion sizes, drink less juice and soda pop, and eat more fruits and vegetables. ¨ Eat a healthy amount of food. A 3-ounce serving of meat, for example, is about the size of a deck of cards. Fill the rest of your plate with vegetables and whole grains. ¨ Limit the amount of soda and sports drinks you have every day. Drink more water when you are thirsty. ¨ Eat at least 5 servings of fruits and vegetables every day. It may seem like a lot, but it is not hard to reach this goal. A serving or helping is 1 piece of fruit, 1 cup of vegetables, or 2 cups of leafy, raw vegetables. Have an apple or some carrot sticks as an afternoon snack instead of a candy bar. Try to have fruits and/or vegetables at every meal. 
· Make exercise part of your daily routine. You may want to start with simple activities, such as walking, bicycling, or slow swimming. Try to be active 30 to 60 minutes every day. You do not need to do all 30 to 60 minutes all at once. For example, you can exercise 3 times a day for 10 or 20 minutes. Moderate exercise is safe for most people, but it is always a good idea to talk to your doctor before starting an exercise program. 
· Keep moving. Divina Fails the lawn, work in the garden, or [employer]. Take the stairs instead of the elevator at work. · If you smoke, quit. People who smoke have an increased risk for heart attack, stroke, cancer, and other lung illnesses. Quitting is hard, but there are ways to boost your chance of quitting tobacco for good. ¨ Use nicotine gum, patches, or lozenges. ¨ Ask your doctor about stop-smoking programs and medicines. ¨ Keep trying. In addition to reducing your risk of diseases in the future, you will notice some benefits soon after you stop using tobacco. If you have shortness of breath or asthma symptoms, they will likely get better within a few weeks after you quit. · Limit how much alcohol you drink.  Moderate amounts of alcohol (up to 2 drinks a day for men, 1 drink a day for women) are okay. But drinking too much can lead to liver problems, high blood pressure, and other health problems. Family health If you have a family, there are many things you can do together to improve your health. · Eat meals together as a family as often as possible. · Eat healthy foods. This includes fruits, vegetables, lean meats and dairy, and whole grains. · Include your family in your fitness plan. Most people think of activities such as jogging or tennis as the way to fitness, but there are many ways you and your family can be more active. Anything that makes you breathe hard and gets your heart pumping is exercise. Here are some tips: 
¨ Walk to do errands or to take your child to school or the bus. ¨ Go for a family bike ride after dinner instead of watching TV. Where can you learn more? Go to http://bennett-sangita.info/. Enter Z030 in the search box to learn more about \"A Healthy Lifestyle: Care Instructions. \" Current as of: July 26, 2016 Content Version: 11.2 © 2168-3404 [employer]. Care instructions adapted under license by [employer] (which disclaims liability or warranty for this information). If you have questions about a medical condition or this instruction, always ask your healthcare professional. [address] any warranty or liability for your use of this information. Introducing [facility]! Beatrice Sosa introduces [employer] patient portal. Now you can access parts of your medical record, email your doctor's office, and request medication refills online. 1. In your internet browser, go to [email or web address] 2. Click on the First Time User? Click Here link in the Sign In box. You will see the New Member Sign Up page. 3. Enter your [employer] Access Code exactly as it appears below.  You will not need to use this code after youve completed the sign-up process. If you do not sign up before the expiration date, you must request a new code. · [employer]ot[employer] Access Code: [email or web address] Expires: 9/10/2017 11:44 AM 
 
4. Enter the last four digits of your Social Security Number (xxxx) and Date of Birth (mm/dd/yyyy) as indicated and click Submit. You will be taken to the next sign-up page. 5. Create a fotobabble ID. This will be your fot[employer]ble login ID and cannot be changed, so think of one that is secure and easy to remember. 6. Create a [employer]ble password. You can change your password at any time. 7. Enter your Password Reset Question and Answer. This can be used at a later time if you forget your password. 8. Enter your e-mail address. You will receive e-mail notification when new information is available in [address]. 9. Click Sign Up. You can now view and download portions of your medical record. 10. Click the Download Summary menu link to download a portable copy of your medical information. If you have questions, please visit the Frequently Asked Questions section of the f[employer]abble website. Remember, [employer]ble is NOT to be used for urgent needs. For medical emergencies, dial 911. Now available from your iPhone and Android! Please provide this summary of care documentation to your next provider. Your primary care clinician is listed as Thiago Toscano. If you have any questions after today's visit, please call [phone].

## 2017-06-16 LAB — LAMOTRIGINE SERPL-MCNC: 6.3 UG/ML (ref 2–20)

## 2017-07-17 ENCOUNTER — TELEPHONE (OUTPATIENT)
Dept: NEUROLOGY | Age: 72
End: 2017-07-17

## 2017-07-17 NOTE — TELEPHONE ENCOUNTER
----- Message from Annmarie Rees sent at 7/17/2017 12:14 PM EDT -----  Regarding: Dr. Baldemar Syed telephone   Pt is requesting a call back to discuss a recent seizure. Best contact number [phone].

## 2017-07-26 ENCOUNTER — TELEPHONE (OUTPATIENT)
Dept: INTERNAL MEDICINE CLINIC | Age: 72
End: 2017-07-26

## 2017-07-26 DIAGNOSIS — E78.2 MIXED HYPERLIPIDEMIA: ICD-10-CM

## 2017-07-26 NOTE — TELEPHONE ENCOUNTER
[phone] pt says dr Lidia García gave her a script for Yahir Palencia and says that it is to expensive for her.

## 2017-07-27 RX ORDER — DILTIAZEM HYDROCHLORIDE 120 MG/1
CAPSULE, EXTENDED RELEASE ORAL
Qty: 90 CAP | Refills: 0 | Status: SHIPPED | OUTPATIENT
Start: 2017-07-27 | End: 2017-07-27 | Stop reason: SDUPTHER

## 2017-07-27 RX ORDER — SIMVASTATIN 20 MG/1
TABLET, FILM COATED ORAL
Qty: 90 TAB | Refills: 0 | Status: SHIPPED | OUTPATIENT
Start: 2017-07-27 | End: 2017-11-29 | Stop reason: SDUPTHER

## 2017-07-28 RX ORDER — DILTIAZEM HYDROCHLORIDE 120 MG/1
CAPSULE, EXTENDED RELEASE ORAL
Qty: 90 CAP | Refills: 1 | Status: SHIPPED | OUTPATIENT
Start: 2017-07-28 | End: 2018-02-23 | Stop reason: SDUPTHER

## 2017-08-28 ENCOUNTER — OFFICE VISIT (OUTPATIENT)
Dept: NEUROLOGY | Age: 72
End: 2017-08-28

## 2017-08-28 VITALS
HEIGHT: 59 IN | SYSTOLIC BLOOD PRESSURE: 152 MMHG | BODY MASS INDEX: 32.66 KG/M2 | DIASTOLIC BLOOD PRESSURE: 72 MMHG | WEIGHT: 162 LBS | OXYGEN SATURATION: 98 % | HEART RATE: 75 BPM | RESPIRATION RATE: 14 BRPM

## 2017-08-28 DIAGNOSIS — G93.0 CYST OF BRAIN: ICD-10-CM

## 2017-08-28 DIAGNOSIS — G40.209 PARTIAL SYMPTOMATIC EPILEPSY WITH COMPLEX PARTIAL SEIZURES, NOT INTRACTABLE, WITHOUT STATUS EPILEPTICUS (HCC): ICD-10-CM

## 2017-08-28 DIAGNOSIS — M54.81 OCCIPITAL NEURALGIA OF RIGHT SIDE: Primary | ICD-10-CM

## 2017-08-28 RX ORDER — LAMOTRIGINE 150 MG/1
150 TABLET ORAL DAILY
Qty: 180 TAB | Refills: 2 | Status: SHIPPED | OUTPATIENT
Start: 2017-08-28 | End: 2017-12-14 | Stop reason: DRUGHIGH

## 2017-08-28 NOTE — PATIENT INSTRUCTIONS

## 2017-08-28 NOTE — PROGRESS NOTES
Chief Complaint   Patient presents with    Neurologic Problem     Occipital Neuralgia         HISTORY OF PRESENT ILLNESS  Rigoberto Burk came back for follow up. She reports 2 episodes where she just froze briefly while sitting down. Her eyes were open but she could not respond to people. It lasted about a minute or 2 each time. No witnessed convulsions or postictal state. The pain related to occipital neuralgia on the right side has started to act up again over the past several days but is not as bad . She is taking lamotrigine 100 mg twice a day. She had an EEG for evaluation of spells where she had alteration of awareness and could not respond to people. EEG showed epileptiform activity intermittently out of the left temporal head region. She was also having occasional weakness in the right upper and lower extremity. She has never had any witnessed generalized convulsion. 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 This was stable on her last scan compared to the one 2 years prior. History reviewed. No pertinent past medical history. Current Outpatient Prescriptions   Medication Sig    lamoTRIgine (LAMICTAL) 150 mg tablet Take 1 Tab by mouth daily.  CARTIA  mg ER capsule TAKE 1 CAPSULE EVERY DAY    simvastatin (ZOCOR) 20 mg tablet TAKE 1 TABLET EVERY NIGHT    budesonide-formoterol (SYMBICORT) 160-4.5 mcg/actuation HFA inhaler Take 2 Puffs by inhalation two (2) times a day.  albuterol (PROAIR HFA) 90 mcg/actuation inhaler Take 1 Puff by inhalation every four (4) hours as needed for Wheezing or Shortness of Breath.  mometasone-formoterol (DULERA) 100-5 mcg/actuation HFA inhaler Take 2 Puffs by inhalation two (2) times a day.  cyclobenzaprine (FLEXERIL) 5 mg tablet Take 1 Tab by mouth three (3) times daily as needed for Muscle Spasm(s). Indications:  MUSCLE SPASM     No current facility-administered medications for this visit.        PHYSICAL EXAMINATION:    Visit Vitals    /72    Pulse 75    Resp 14    Ht 4' 11\" (1.499 m)    Wt 73.5 kg (162 lb)    SpO2 98%    BMI 32.72 kg/m2       NEUROLOGICAL EXAMINATION:     Mental Status:   Alert and oriented to person, place, and time with recent and remote memory intact. Attention span and concentration are normal. Speech is fluent with a full fund of knowledge. Cranial Nerves:    II, III, IV, VI:  Visual acuity grossly intact. Visual fields are normal.    Pupils are equal, round, and reactive to light and accommodation. Extra-ocular movements are full and fluid. Fundoscopic exam was benign, no ptosis or nystagmus. V-XII: Hearing is grossly intact. Facial features are symmetric, with normal sensation and strength. The palate rises symmetrically and the tongue protrudes midline. Sternocleidomastoids 5/5. Motor Examination: Slight weakness of the  strength on the right and slight weakness of the great toe extension on the right. Normal tone, bulk, and strength on the left side. No cogwheel rigidity or clonus present. Sensory exam:  Normal throughout to pinprick, temperature, and vibration sense. Normal proprioception. Coordination:  Heel-to-shin was smooth and symmetrical bilaterally. Finger to nose and rapid arm movement testing was normal.   No resting or intention tremor    Gait and Station:  Steady while walking on toes, heels, and with tandem walking. Normal arm swing. No Rhomberg or pronator drift. No muscle wasting or fasiculations noted. Reflexes:  DTRs 2+ throughout. Toes downgoing.         LABS / IMAGING  Lab Results   Component Value Date/Time    Sodium 141 10/18/2016 07:23 AM    Potassium 3.8 10/18/2016 07:23 AM    Chloride 101 10/18/2016 07:23 AM    CO2 26 10/18/2016 07:23 AM    Anion gap 8 09/22/2010 03:33 PM    Glucose 98 10/18/2016 07:23 AM    BUN 21 10/18/2016 07:23 AM    Creatinine 0.83 10/18/2016 07:23 AM BUN/Creatinine ratio 25 10/18/2016 07:23 AM    GFR est AA 83 10/18/2016 07:23 AM    GFR est non-AA 72 10/18/2016 07:23 AM    Calcium 8.8 10/18/2016 07:23 AM    Bilirubin, total 0.3 10/18/2016 07:23 AM    AST (SGOT) 17 10/18/2016 07:23 AM    Alk. phosphatase 79 10/18/2016 07:23 AM    Protein, total 6.2 10/18/2016 07:23 AM    Albumin 3.9 10/18/2016 07:23 AM    Globulin 3.0 09/22/2010 03:33 PM    A-G Ratio 1.7 10/18/2016 07:23 AM    ALT (SGPT) 16 10/18/2016 07:23 AM     Lab Results   Component Value Date/Time    WBC 11.8 10/18/2016 07:23 AM    HGB 13.9 10/18/2016 07:23 AM    HCT 40.9 10/18/2016 07:23 AM    PLATELET 493 96/55/8376 07:23 AM    MCV 91 10/18/2016 07:23 AM     Last Lamotrigine level was 6.2    ASSESSMENT    ICD-10-CM ICD-9-CM    1. Occipital neuralgia of right side M54.81 723.8 lamoTRIgine (LAMICTAL) 150 mg tablet   2. 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 Partial symptomatic epilepsy with complex partial seizures, not intractable, without status epilepticus (HCC) G40.209 345.40 lamoTRIgine (LAMICTAL) 150 mg tablet       DISCUSSION  Ms. Ashleigh Pruitt has had episodes where she could not respond to move. Complex partial seizures are suspected given abnormal EEG. She was also going through a lot of stress during this most recent episodes and could be nonepileptic   I have recommended increasing lamotrigine to 150 mg twice a day. If episodes continue to recur, will consider EMU. This may help with occipital neuralgia as well. If not, will consider repeat occipital nerve block. 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 Scans have been stable over the past 2-3 years.       Follow up in 3 months or earlier    Sudhir Corea MD  Diplomate, American Board of Psychiatry & Neurology (Neurology)  [place] Board of Psychiatry & Neurology (Clinical Neurophysiology)  Diplomate, American Board of Electrodiagnostic Medicine

## 2017-08-28 NOTE — PROGRESS NOTES
Patient is here for follow up  Headaches are back, have been for 3 months  Almost daily usually 5/10

## 2017-08-28 NOTE — MR AVS SNAPSHOT
Visit Information Date & Time Provider Department Dept. Phone Encounter #  
 8/28/2017 10:15 AM [facility] at [address] [place] [address] [id number] Follow-up Instructions Return in about 3 months (around 11/28/2017). Your Appointments 9/12/2017 10:00 AM  
ROUTINE CARE with Aaliyah Gallegos MD  
[facility] [address] [facility]) Appt Note: fu  
 [address] [facility] [address]  
[address] [address] [facility] [address]  
  
    
 12/14/2017 10:40 AM  
Follow Up with MD [facility] at [facility] [address] [place] [address]) Appt Note: 6 month follow up KRU 6/14  
 [address] [place] [address]  
[phone]  
  
   
 [address]  [address] Upcoming Health Maintenance Date Due FOBT Q 1 YEAR AGE 50-75 11/21/1996 GLAUCOMA SCREENING Q2Y 11/21/2011 INFLUENZA AGE 9 TO ADULT 8/1/2017 Pneumococcal 65+ Low/Medium Risk (2 of 2 - PPSV23) 9/21/2017 MEDICARE YEARLY EXAM 9/22/2017 BREAST CANCER SCRN MAMMOGRAM 3/31/2018 DTaP/Tdap/Td series (2 - Td) 11/17/2024 Allergies as of 8/28/2017  Review Complete On: 8/28/2017 By: Sudhir Corea MD  
  
 Severity Noted Reaction Type Reactions Prednisone  11/09/2016    Other (comments) Shakiness and headache Current Immunizations  Never Reviewed Name Date Tdap 11/17/2014 Not reviewed this visit You Were Diagnosed With   
  
 Codes Comments Occipital neuralgia of right side    -  Primary ICD-10-CM: M54.81 ICD-9-CM: 723.8 Cyst of brain     ICD-10-CM: G93.0 ICD-9-CM: 740. 0 Partial symptomatic epilepsy with complex partial seizures, not intractable, without status epilepticus ([facility] [address]     ICD-10-CM: H06.493 ICD-9-CM: 345.40 Vitals BP Pulse Resp Height(growth percentile) Weight(growth percentile) SpO2  
 152/72 75 14 4' 11\" (1.499 m) 162 lb (73.5 kg) 98% BMI OB Status Smoking Status 32.72 kg/m2 Postmenopausal Current Every Day Smoker Vitals History BMI and BSA Data Body Mass Index Body Surface Area 32.72 kg/m 2 1.75 m 2 Preferred Pharmacy Pharmacy Name Phone [facility] [phone] Your Updated Medication List  
  
   
This list is accurate as of: 8/28/17 10:39 AM.  Always use your most recent med list.  
  
  
  
  
 albuterol 90 mcg/actuation inhaler Commonly known as:  PROAIR HFA Take 1 Puff by inhalation every four (4) hours as needed for Wheezing or Shortness of Breath. CARTIA  mg ER capsule Generic drug:  dilTIAZem CD  
TAKE 1 CAPSULE EVERY DAY  
  
 cyclobenzaprine 5 mg tablet Commonly known as:  FLEXERIL Take 1 Tab by mouth three (3) times daily as needed for Muscle Spasm(s). Indications: MUSCLE SPASM  
  
 lamoTRIgine 150 mg tablet Commonly known as: LaMICtal  
Take 1 Tab by mouth daily. mometasone-formoterol 100-5 mcg/actuation HFA inhaler Commonly known as:  Raymona Mends Take 2 Puffs by inhalation two (2) times a day. simvastatin 20 mg tablet Commonly known as:  ZOCOR  
TAKE 1 TABLET EVERY NIGHT  
  
 SYMBICORT 160-4.5 mcg/actuation HFA inhaler Generic drug:  budesonide-formoterol Take 2 Puffs by inhalation two (2) times a day. Prescriptions Sent to Pharmacy Refills  
 lamoTRIgine (LAMICTAL) 150 mg tablet 2 Sig: Take 1 Tab by mouth daily. Class: Normal  
 Pharmacy: [address] [place] [address], [facility] [facility] Ph #: [phone] Route: Oral  
  
Follow-up Instructions Return in about 3 months (around 11/28/2017). Patient Instructions A Healthy Lifestyle: Care Instructions Your Care Instructions A healthy lifestyle can help you feel good, stay at a healthy weight, and have plenty of energy for both work and play. A healthy lifestyle is something you can share with your whole family. A healthy lifestyle also can lower your risk for serious health problems, such as high blood pressure, heart disease, and diabetes. You can follow a few steps listed below to improve your health and the health of your family. Follow-up care is a key part of your treatment and safety. Be sure to make and go to all appointments, and call your doctor if you are having problems. Its also a good idea to know your test results and keep a list of the medicines you take. How can you care for yourself at home? · Do not eat too much sugar, fat, or fast foods. You can still have dessert and treats now and then. The goal is moderation. · Start small to improve your eating habits. Pay attention to portion sizes, drink less juice and soda pop, and eat more fruits and vegetables. ¨ Eat a healthy amount of food. A 3-ounce serving of meat, for example, is about the size of a deck of cards. Fill the rest of your plate with vegetables and whole grains. ¨ Limit the amount of soda and sports drinks you have every day. Drink more water when you are thirsty. ¨ Eat at least 5 servings of fruits and vegetables every day. It may seem like a lot, but it is not hard to reach this goal. A serving or helping is 1 piece of fruit, 1 cup of vegetables, or 2 cups of leafy, raw vegetables. Have an apple or some carrot sticks as an afternoon snack instead of a candy bar. Try to have fruits and/or vegetables at every meal. 
· Make exercise part of your daily routine. You may want to start with simple activities, such as walking, bicycling, or slow swimming. Try to be active 30 to 60 minutes every day. You do not need to do all 30 to 60 minutes all at once.  For example, you can exercise 3 times a day for 10 or 20 minutes. Moderate exercise is safe for most people, but it is always a good idea to talk to your doctor before starting an exercise program. 
· Keep moving. Arturo Mejia the lawn, work in the garden, or [place]. Take the stairs instead of the elevator at work. · If you smoke, quit. People who smoke have an increased risk for heart attack, stroke, cancer, and other lung illnesses. Quitting is hard, but there are ways to boost your chance of quitting tobacco for good. ¨ Use nicotine gum, patches, or lozenges. ¨ Ask your doctor about stop-smoking programs and medicines. ¨ Keep trying. In addition to reducing your risk of diseases in the future, you will notice some benefits soon after you stop using tobacco. If you have shortness of breath or asthma symptoms, they will likely get better within a few weeks after you quit. · Limit how much alcohol you drink. Moderate amounts of alcohol (up to 2 drinks a day for men, 1 drink a day for women) are okay. But drinking too much can lead to liver problems, high blood pressure, and other health problems. Family health If you have a family, there are many things you can do together to improve your health. · Eat meals together as a family as often as possible. · Eat healthy foods. This includes fruits, vegetables, lean meats and dairy, and whole grains. · Include your family in your fitness plan. Most people think of activities such as jogging or tennis as the way to fitness, but there are many ways you and your family can be more active. Anything that makes you breathe hard and gets your heart pumping is exercise. Here are some tips: 
¨ Walk to do errands or to take your child to school or the bus. ¨ Go for a family bike ride after dinner instead of watching TV. Where can you learn more? Go to http://bennett-sangita.info/. Enter J861 in the search box to learn more about \"A Healthy Lifestyle: Care Instructions. \" Current as of: July 26, 2016 Content Version: 11.3 © 6518-9081 [employer], [employer]. Care instructions adapted under license by [employer] (which disclaims liability or warranty for this information). If you have questions about a medical condition or this instruction, always ask your healthcare professional. [address] any warranty or liability for your use of this information. Introducing [facility]! [employer] introduces [employer] patient portal. Now you can access parts of your medical record, email your doctor's office, and request medication refills online. 1. In your internet browser, go to [email or web address] 2. Click on the First Time User? Click Here link in the Sign In box. You will see the New Member Sign Up page. 3. Enter your Uvinum Access Code exactly as it appears below. You will not need to use this code after youve completed the sign-up process. If you do not sign up before the expiration date, you must request a new code. · Uvinum Access Code: [email or web address] Expires: 9/10/2017 11:44 AM 
 
4. Enter the last four digits of your Social Security Number (xxxx) and Date of Birth (mm/dd/yyyy) as indicated and click Submit. You will be taken to the next sign-up page. 5. Create a Uvinum ID. This will be your Uvinum login ID and cannot be changed, so think of one that is secure and easy to remember. 6. Create a Uvinum password. You can change your password at any time. 7. Enter your Password Reset Question and Answer. This can be used at a later time if you forget your password. 8. Enter your e-mail address. You will receive e-mail notification when new information is available in [address] 9. Click Sign Up. You can now view and download portions of your medical record. 10. Click the Download Summary menu link to download a portable copy of your medical information. If you have questions, please visit the Frequently Asked Questions section of the [employer] website. Remember, The 5th Quarter is NOT to be used for urgent needs. For medical emergencies, dial 911. Now available from your iPhone and Android! Please provide this summary of care documentation to your next provider. Your primary care clinician is listed as Zulma Fontanez. If you have any questions after today's visit, please call [phone].

## 2017-10-10 ENCOUNTER — OFFICE VISIT (OUTPATIENT)
Dept: INTERNAL MEDICINE CLINIC | Age: 72
End: 2017-10-10

## 2017-10-10 VITALS
SYSTOLIC BLOOD PRESSURE: 140 MMHG | DIASTOLIC BLOOD PRESSURE: 90 MMHG | OXYGEN SATURATION: 93 % | RESPIRATION RATE: 20 BRPM | HEIGHT: 59 IN | TEMPERATURE: 97.2 F | HEART RATE: 75 BPM | WEIGHT: 160.6 LBS | BODY MASS INDEX: 32.38 KG/M2

## 2017-10-10 DIAGNOSIS — Z71.89 ADVANCED CARE PLANNING/COUNSELING DISCUSSION: ICD-10-CM

## 2017-10-10 DIAGNOSIS — Z00.00 MEDICARE ANNUAL WELLNESS VISIT, SUBSEQUENT: Primary | ICD-10-CM

## 2017-10-10 DIAGNOSIS — Z13.31 SCREENING FOR DEPRESSION: ICD-10-CM

## 2017-10-10 DIAGNOSIS — R07.89 CHEST WALL DISCOMFORT: ICD-10-CM

## 2017-10-10 DIAGNOSIS — Z13.5 SCREENING FOR GLAUCOMA: ICD-10-CM

## 2017-10-10 DIAGNOSIS — R05.9 COUGH: ICD-10-CM

## 2017-10-10 DIAGNOSIS — Z13.39 SCREENING FOR ALCOHOLISM: ICD-10-CM

## 2017-10-10 RX ORDER — BUDESONIDE AND FORMOTEROL FUMARATE DIHYDRATE 80; 4.5 UG/1; UG/1
2 AEROSOL RESPIRATORY (INHALATION) 2 TIMES DAILY
Qty: 1 INHALER | Refills: 0 | Status: SHIPPED | COMMUNITY
Start: 2017-10-10 | End: 2017-10-16 | Stop reason: SDUPTHER

## 2017-10-10 RX ORDER — IBUPROFEN 800 MG/1
800 TABLET ORAL
Qty: 30 TAB | Refills: 1 | Status: SHIPPED | OUTPATIENT
Start: 2017-10-10 | End: 2020-09-02 | Stop reason: SDUPTHER

## 2017-10-10 NOTE — MR AVS SNAPSHOT
Visit Information Date & Time Provider Department Dept. Phone Encounter #  
 10/10/2017 10:00 AM [facility] [phone] [id number] Follow-up Instructions Return in about 3 months (around 1/10/2018) for Follow up. Your Appointments 12/14/2017 10:40 AM  
Follow Up with Colleen Rivera MD  
[facility] at [facility] Appt Note: 6 month follow up KRU 6/14  
 [address] [place] [address]  
[phone]  
  
   
 [address] [address] Upcoming Health Maintenance Date Due FOBT Q 1 YEAR AGE 50-75 11/21/1996 GLAUCOMA SCREENING Q2Y 11/21/2011 BREAST CANCER SCRN MAMMOGRAM 3/31/2018 MEDICARE YEARLY EXAM 10/11/2018 DTaP/Tdap/Td series (2 - Td) 11/17/2024 Allergies as of 10/10/2017  Review Complete On: 10/10/2017 By: Malaika Lloyd MD  
  
 Severity Noted Reaction Type Reactions Prednisone  11/09/2016    Other (comments) Shakiness and headache Current Immunizations  Never Reviewed Name Date Tdap 11/17/2014 Not reviewed this visit You Were Diagnosed With   
  
 Codes Comments Medicare annual wellness visit, subsequent    -  Primary ICD-10-CM: Z00.00 ICD-9-CM: V70.0 Advanced care planning/counseling discussion     ICD-10-CM: Z71.89 ICD-9-CM: V65.49 Screening for depression     ICD-10-CM: Z13.89 ICD-9-CM: V79.0 Screening for alcoholism     ICD-10-CM: Z13.89 ICD-9-CM: V79.1 Screening for glaucoma     ICD-10-CM: Z13.5 ICD-9-CM: V80.1 Vitals BP Pulse Temp Resp Height(growth percentile) Weight(growth percentile) 140/90 (BP 1 Location: Right arm, BP Patient Position: Sitting) 75 97.2 °F (36.2 °C) (Oral) 20 4' 11\" (1.499 m) 160 lb 9.6 oz (72.8 kg) SpO2 BMI OB Status Smoking Status 93% 32.44 kg/m2 Postmenopausal Current Every Day Smoker BMI and BSA Data Body Mass Index Body Surface Area  
 32.44 kg/m 2 1.74 m 2 Preferred Pharmacy Pharmacy Name Phone [facility] [address] [place] [address] [address] [phone] Your Updated Medication List  
  
   
This list is accurate as of: 10/10/17 10:57 AM.  Always use your most recent med list.  
  
  
  
  
 albuterol 90 mcg/actuation inhaler Commonly known as:  PROAIR HFA Take 1 Puff by inhalation every four (4) hours as needed for Wheezing or Shortness of Breath. CARTIA  mg ER capsule Generic drug:  dilTIAZem CD  
TAKE 1 CAPSULE EVERY DAY  
  
 cyclobenzaprine 5 mg tablet Commonly known as:  FLEXERIL Take 1 Tab by mouth three (3) times daily as needed for Muscle Spasm(s). Indications: MUSCLE SPASM  
  
 lamoTRIgine 150 mg tablet Commonly known as: LaMICtal  
Take 1 Tab by mouth daily. mometasone-formoterol 100-5 mcg/actuation HFA inhaler Commonly known as:  Jett Soles Take 2 Puffs by inhalation two (2) times a day. simvastatin 20 mg tablet Commonly known as:  ZOCOR  
TAKE 1 TABLET EVERY NIGHT  
  
 SYMBICORT 160-4.5 mcg/actuation Hfaa Generic drug:  budesonide-formoterol Take 2 Puffs by inhalation two (2) times a day. We Performed the Following REFERRAL TO OPHTHALMOLOGY [REF57 Custom] Comments:  
 Please evaluate patient for glaucoma. Follow-up Instructions Return in about 3 months (around 1/10/2018) for Follow up. Referral Information Referral ID Referred By Referred To  
  
 [id number] Ralf Gutierrez [facility] [address]  [place], [address] Visits Status Start Date End Date 1 New Request 10/10/17 10/10/18 If your referral has a status of pending review or denied, additional information will be sent to support the outcome of this decision. Patient Instructions Today you had a Medicare Wellness Visit. During this visit, we developed and/or updated your personalized health plan to prevent disease and disability based on your current health and risk factors. Please schedule an appt around this time next year so we can continue to keep you on the right path to living a healthy lifestyle. Schedule of Personalized Health Plan The best way to stay healthy is to live a healthy lifestyle. A healthy lifestyle includes regular exercise, eating a well-balanced diet, keeping a healthy weight and not smoking. Regular physical exams and screening tests are another important way to take care of yourself. Preventive exams provided by health care providers can find health problems early when treatment works best and can keep you from getting certain diseases or illnesses. Preventive services include exams, lab tests, screenings, shots, monitoring and information to help you take care of your own health. All people over 65 should have a pneumonia shot. Pneumonia shots are usually only needed once in a lifetime unless your doctor decides differently. All people over 65 should have a yearly flu shot. People over 65 are at medium to high risk for Hepatitis B. Three shots are needed for complete protection. For additional information, please discuss with physician. In addition to your physical exam, some screening tests are recommended: 
 
Bone mass measurement (dexa scan) is recommended every  two years. Diabetes Mellitus screening is recommended every year. Glaucoma is an eye disease caused by high pressure in the eye. An eye exam is recommended every year. Cardiovascular screening tests that check your cholesterol and other blood fat (lipid) levels are recommended every five years.   
 
Colorectal Cancer screening tests help to find pre-cancerous polyps (growths in the colon) so they can be removed before they turn into cancer. Tests ordered for screening depend on your personal and family history risk factors. Mammogram screening for Breast Cancer is recommended yearly. Screening for Cervical Cancer is recommended every two years (annually for certain risk factors, such as previous history of STD or abnormal PAP in past 7 years). Here is a list of your current Health Maintenance items with a due date: 
Health Maintenance Topic Date Due  
 FOBT Q 1 YEAR AGE 50-75  11/21/1996  GLAUCOMA SCREENING Q2Y  11/21/2011  INFLUENZA AGE 9 TO ADULT  08/01/2017  Pneumococcal 65+ Low/Medium Risk (2 of 2 - PPSV23) 09/21/2017  BREAST CANCER SCRN MAMMOGRAM  03/31/2018  MEDICARE YEARLY EXAM  10/11/2018  DTaP/Tdap/Td series (2 - Td) 11/17/2024  Hepatitis C Screening  Completed  OSTEOPOROSIS SCREENING (DEXA)  Completed  ZOSTER VACCINE AGE 60>  Addressed I recommend 1200 mg calcium daily, preferably from calcium-rich or calcium-supplemented foods, and 1000 international units of vitamin D daily. You may need to add an additional calcium supplements to reach these goals. The main dietary sources of calcium include milk, and other dairy products, such as cottage cheese, yogurt, or hard cheese, and green vegetables, such as kale and broccoli. It can also come from sardines with bones, almonds, melendrez beans, calcium fortified OJ. A rough method of estimating dietary calcium intake is to multiply the number of dairy servings consumed each day by 300 mg. One serving is 8 oz of milk (236 mL) or yogurt (224 g), 1 oz (28 g) of hard cheese, or 16 oz (448 g) of cottage cheese. Vit D can be found in [place], [place], Vit D fortified OJ, Margarine, fortified breakfast cereal, egg. Introducing [facility]! Shayla Albarran introduces [employer] patient portal. Now you can access parts of your medical record, email your doctor's office, and request medication refills online. 1. In your internet browser, go to [email or web address] 2. Click on the First Time User? Click Here link in the Sign In box. You will see the New Member Sign Up page. 3. Enter your MIKA Audio Access Code exactly as it appears below. You will not need to use this code after youve completed the sign-up process. If you do not sign up before the expiration date, you must request a new code. · [employer]KA [employer] Access Code: [email or web address] Expires: 1/8/2018  9:49 AM 
 
4. Enter the last four digits of your Social Security Number (xxxx) and Date of Birth (mm/dd/yyyy) as indicated and click Submit. You will be taken to the next sign-up page. 5. Create a DC Devicest ID. This will be your MIKA Audio login ID and cannot be changed, so think of one that is secure and easy to remember. 6. Create a MIKA Audio password. You can change your password at any time. 7. Enter your Password Reset Question and Answer. This can be used at a later time if you forget your password. 8. Enter your e-mail address. You will receive e-mail notification when new information is available in [address] 9. Click Sign Up. You can now view and download portions of your medical record. 10. Click the Download Summary menu link to download a portable copy of your medical information. If you have questions, please visit the Frequently Asked Questions section of the [employer] website. Remember, [employer]KA [employer] is NOT to be used for urgent needs. For medical emergencies, dial 911. Now available from your iPhone and Android! Please provide this summary of care documentation to your next provider. Your primary care clinician is listed as Chaparro Marques. If you have any questions after today's visit, please call [phone].

## 2017-10-10 NOTE — PATIENT INSTRUCTIONS
Today you had a Medicare Wellness Visit. During this visit, we developed and/or updated your personalized health plan to prevent disease and disability based on your current health and risk factors. Please schedule an appt around this time next year so we can continue to keep you on the right path to living a healthy lifestyle. Schedule of Personalized Health Plan    The best way to stay healthy is to live a healthy lifestyle. A healthy lifestyle includes regular exercise, eating a well-balanced diet, keeping a healthy weight and not smoking. Regular physical exams and screening tests are another important way to take care of yourself. Preventive exams provided by health care providers can find health problems early when treatment works best and can keep you from getting certain diseases or illnesses. Preventive services include exams, lab tests, screenings, shots, monitoring and information to help you take care of your own health. All people over 65 should have a pneumonia shot. Pneumonia shots are usually only needed once in a lifetime unless your doctor decides differently. All people over 65 should have a yearly flu shot. People over 65 are at medium to high risk for Hepatitis B. Three shots are needed for complete protection. For additional information, please discuss with physician. In addition to your physical exam, some screening tests are recommended:    Bone mass measurement (dexa scan) is recommended every  two years. Diabetes Mellitus screening is recommended every year. Glaucoma is an eye disease caused by high pressure in the eye. An eye exam is recommended every year. Cardiovascular screening tests that check your cholesterol and other blood fat (lipid) levels are recommended every five years. Colorectal Cancer screening tests help to find pre-cancerous polyps (growths in the colon) so they can be removed before they turn into cancer.   Tests ordered for screening depend on your personal and family history risk factors. Mammogram screening for Breast Cancer is recommended yearly. Screening for Cervical Cancer is recommended every two years (annually for certain risk factors, such as previous history of STD or abnormal PAP in past 7 years). Here is a list of your current Health Maintenance items with a due date:  Health Maintenance   Topic Date Due    FOBT Q 1 YEAR AGE 50-75  11/21/1996    GLAUCOMA SCREENING Q2Y  11/21/2011    INFLUENZA AGE 9 TO ADULT  08/01/2017    Pneumococcal 65+ Low/Medium Risk (2 of 2 - PPSV23) 09/21/2017    BREAST CANCER SCRN MAMMOGRAM  03/31/2018    MEDICARE YEARLY EXAM  10/11/2018    DTaP/Tdap/Td series (2 - Td) 11/17/2024    Hepatitis C Screening  Completed    OSTEOPOROSIS SCREENING (DEXA)  Completed    ZOSTER VACCINE AGE 60>  Addressed       I recommend 1200 mg calcium daily, preferably from calcium-rich or calcium-supplemented foods, and 1000 international units of vitamin D daily. You may need to add an additional calcium supplements to reach these goals. The main dietary sources of calcium include milk, and other dairy products, such as cottage cheese, yogurt, or hard cheese, and green vegetables, such as kale and broccoli. It can also come from sardines with bones, almonds, melendrez beans, calcium fortified OJ. A rough method of estimating dietary calcium intake is to multiply the number of dairy servings consumed each day by 300 mg. One serving is 8 oz of milk (236 mL) or yogurt (224 g), 1 oz (28 g) of hard cheese, or 16 oz (448 g) of cottage cheese. Vit D can be found in [place], [place], Vit D fortified OJ, Margarine, fortified breakfast cereal, egg. Coricidin for your nose, Motrin for your chest wall. Let me know how you are doing in the next week.

## 2017-10-10 NOTE — PROGRESS NOTES
Pennie Chapman is a 80 y.o. female and presents for [facility] Wellness Visit. Assessment of cognitive impairment: Alert and oriented x 3. Abuse Screen:    Abuse Screening Questionnaire 10/10/2017   Do you ever feel afraid of your partner? N   Are you in a relationship with someone who physically or mentally threatens you? N   Is it safe for you to go home? Y       Depression Screen:   PHQ over the last two weeks 10/10/2017   Little interest or pleasure in doing things Not at all   Feeling down, depressed or hopeless Not at all   Total Score PHQ 2 0       Fall Risk Assessment:    Fall Risk Assessment, last 12 mths 10/10/2017   Able to walk? Yes   Fall in past 12 months? No   Fall with injury? -   Number of falls in past 12 months -   Fall Risk Score -       Activities of Daily Living:    ADL Assessment 10/10/2017   Feeding yourself No Help Needed   Getting from bed to chair No Help Needed   Getting dressed No Help Needed   Bathing or showering No Help Needed   Walk across the room (includes cane/walker) No Help Needed   Using the telphone No Help Needed   Taking your medications No Help Needed   Preparing meals No Help Needed   Managing money (expenses/bills) No Help Needed   Moderately strenuous housework (laundry) No Help Needed   Shopping for personal items (toiletries/medicines) No Help Needed   Shopping for groceries No Help Needed   Driving No Help Needed   Climbing a flight of stairs No Help Needed   Getting to places beyond walking distances No Help Needed       Health Maintenance:  Daily Low Dose Aspirin: no  Bone Density: up to date 2/12/16  Glaucoma Screening: no and referral placed  Immunizations:    Tetanus: up to date 11/17/14. Influenza: patient declines. Shingles:  patient declines. Pneumovax:  patient declines. Prevnar: patient declines. Cancer screening:    Cervical: NA.  Breast: patient declines, last done 3/31/16. Colon: patient declines.   Prostate:  NA    Advance Care Planning: End of Life Planning: has an advanced directive - a copy has been provided. Provided pt with \"Respecting Choices packet of Information\" no  Offered facilitator session with NN no     Medications/Allergies: Reviewed with patient  Prior to Admission medications    Medication Sig Start Date End Date Taking? Authorizing Provider   lamoTRIgine (LAMICTAL) 150 mg tablet Take 1 Tab by mouth daily. Patient taking differently: Take 150 mg by mouth two (2) times a day. 17  Yes Shameka Dubois MD   CARTIA  mg ER capsule TAKE 1 CAPSULE EVERY DAY 17  Yes Benson Marshall MD   simvastatin (ZOCOR) 20 mg tablet TAKE 1 TABLET EVERY NIGHT 17  Yes Benson Marshall MD   budesonide-formoterol [facility]) 160-4.5 mcg/actuation HFA inhaler Take 2 Puffs by inhalation two (2) times a day. Historical Provider   albuterol (PROAIR HFA) 90 mcg/actuation inhaler Take 1 Puff by inhalation every four (4) hours as needed for Wheezing or Shortness of Breath. 17   Benson Marshall MD   mometasone-formoterol (DULERA) 100-5 mcg/actuation HFA inhaler Take 2 Puffs by inhalation two (2) times a day. 3/10/17   Benson Marshall MD   cyclobenzaprine (FLEXERIL) 5 mg tablet Take 1 Tab by mouth three (3) times daily as needed for Muscle Spasm(s). Indications:  MUSCLE SPASM 17   Benson Marshall MD     Allergies   Allergen Reactions    Prednisone Other (comments)     Shakiness and headache       PSH: Reviewed with patient  Past Surgical History:   Procedure Laterality Date    HX BREAST LUMPECTOMY      HX  SECTION      HX ORTHOPAEDIC      trigger finger    HX WISDOM TEETH EXTRACTION          SH: Reviewed with patient  Social History   Substance Use Topics    Smoking status: Current Every Day Smoker     Packs/day: 0.50     Years: 50.00     Types: Cigarettes    Smokeless tobacco: Never Used    Alcohol use No       FH: Reviewed with patient  Family History   Problem Relation Age of Onset    Heart Disease Father     Stroke Father     Cancer Paternal Aunt      breast    Cancer Maternal Grandmother     Cancer Paternal Grandmother      colon    Cancer Son      colon    Cancer Paternal Aunt      breast         Objective:  Visit Vitals    /90 (BP 1 Location: Right arm, BP Patient Position: Sitting)    Pulse 75    Temp 97.2 °F (36.2 °C) (Oral)    Resp 20    Ht 4' 11\" (1.499 m)    Wt 160 lb 9.6 oz (72.8 kg)    SpO2 93%    BMI 32.44 kg/m2    Body mass index is 32.44 kg/(m^2). Alcohol Risk Screen:   On any occasion during past 3 months, have you had more than 3 drinks (female) or 4 drinks (male) containing alcohol? No  Do you average more than 7 drinks (female) or 14 drinks (male) per week? No  Type and Amount: none    Tobacco Abuse:  Yes, 1/2 ppd. Patient is NOT ready to quit. Counseled on dangers of tobacco use. Nutrition Screen:  eats three meals a day, patient eats several vegetables a day but does not eat many fruits  Discussed incorporating calcium in diet due to osteoporosis, patient will consider supplement    Hearing Loss:  Mild loss    Vision Loss:   Wears glasses, contact lenses, or have any other visual impairment  Wears glasses    Activities of Daily Living:  Self-care. Requires assistance with: no ADLs  Patient handle his/her own medications  yes Use of pill box  no    Current medical providers:    Patient Care Team:  Mazin Enriquez MD as PCP - General (Internal Medicine)  Nsireen Herrera MD (Neurology)      Plan:      No orders of the defined types were placed in this encounter.       Health Maintenance   Topic Date Due    FOBT Q 1 YEAR AGE 50-75  11/21/1996    GLAUCOMA SCREENING Q2Y  11/21/2011    BREAST CANCER SCRN MAMMOGRAM  03/31/2018    MEDICARE YEARLY EXAM  10/11/2018    DTaP/Tdap/Td series (2 - Td) 11/17/2024    Hepatitis C Screening  Completed    OSTEOPOROSIS SCREENING (DEXA)  Completed    ZOSTER VACCINE AGE 60>  Addressed    Pneumococcal 65+ Low/Medium Risk  Addressed    INFLUENZA AGE 9 TO ADULT  Addressed       *Patient verbalized understanding and agreement with the plan. A copy of the After Visit Summary with personalized health plan was given to the patient today. Physical Exam will be performed by PCP and documented under a separate Progress Note.

## 2017-10-10 NOTE — PROGRESS NOTES
Follow Up Visit    Yarelis Reno is a 80 y.o. female. she presents for Annual Wellness Visit    Documentation of the nurse is noted and agree with all notes and recommendations. Zoë Car is here to talk about   ongoing difficulties with her COPD. She reports that she has had Symbicort however recently has not been using this medication. She had run out of it and as a consequence had not asked for a refill. Additionally, she notes that it was quite expensive. She is followed up with pulmonary in the past however has not done so recently. Off of Symbicort, the patient has had significant amounts of coughing as well as shortness of breath. This is mainly with exertion however she does have some episodes with shortness of breath at rest.  She denies true chest pain or fever with chills. Patient Active Problem List   Diagnosis Code    HTN (hypertension) I10    Tachyarrhythmia R00.0    Mitral valve prolapse I34.1    Hyperlipidemia E78.5    Obesity (BMI 30.0-34. 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 Authorizing Provider   lamoTRIgine (LAMICTAL) 150 mg tablet Take 1 Tab by mouth daily. Patient taking differently: Take 150 mg by mouth two (2) times a day. 8/28/17  Yes Hamida Lazar MD   CARTIA  mg ER capsule TAKE 1 CAPSULE EVERY DAY 7/28/17  Yes Justin Hoskins MD   simvastatin (ZOCOR) 20 mg tablet TAKE 1 TABLET EVERY NIGHT 7/27/17  Yes Justin Hoskins MD   budesonide-formoterol [facility]) 160-4.5 mcg/actuation HFA inhaler Take 2 Puffs by inhalation two (2) times a day. Historical Provider   albuterol (PROAIR HFA) 90 mcg/actuation inhaler Take 1 Puff by inhalation every four (4) hours as needed for Wheezing or Shortness of Breath. 4/5/17   Justin Hoskins MD   mometasone-formoterol (DULERA) 100-5 mcg/actuation HFA inhaler Take 2 Puffs by inhalation two (2) times a day.  3/10/17   Lewis Wright Rexie Eisenmenger, MD   cyclobenzaprine (FLEXERIL) 5 mg tablet Take 1 Tab by mouth three (3) times daily as needed for Muscle Spasm(s). Indications: MUSCLE SPASM 1/6/17   Mazin Enriquez MD         Health Maintenance   Topic Date Due    FOBT Q 1 YEAR AGE 50-75  11/21/1996    GLAUCOMA SCREENING Q2Y  11/21/2011    BREAST CANCER SCRN MAMMOGRAM  03/31/2018    MEDICARE YEARLY EXAM  10/11/2018    DTaP/Tdap/Td series (2 - Td) 11/17/2024    Hepatitis C Screening  Completed    OSTEOPOROSIS SCREENING (DEXA)  Completed    ZOSTER VACCINE AGE 60>  Addressed    Pneumococcal 65+ Low/Medium Risk  Addressed    INFLUENZA AGE 9 TO ADULT  Addressed       Review of Systems   Constitutional: Negative. Respiratory: Negative. Cardiovascular: Negative. Gastrointestinal: Negative. Visit Vitals    /90 (BP 1 Location: Right arm, BP Patient Position: Sitting)    Pulse 75    Temp 97.2 °F (36.2 °C) (Oral)    Resp 20    Ht 4' 11\" (1.499 m)    Wt 160 lb 9.6 oz (72.8 kg)    SpO2 93%    BMI 32.44 kg/m2       Physical Exam   Constitutional: She appears distressed (mildly due to coughing). Cardiovascular: Normal rate and regular rhythm. Exam reveals no friction rub. No murmur heard. Pulmonary/Chest: She has wheezes (mild mid-lung zone wheezes). She has rales. ASSESSMENT/PLAN    Diagnoses and all orders for this visit:    1. Medicare annual wellness visit, subsequent  -     REFERRAL TO OPHTHALMOLOGY    2. Advanced care planning/counseling discussion    3. Screening for depression    4. Screening for alcoholism    5. Screening for glaucoma  -     REFERRAL TO OPHTHALMOLOGY    6. Chest wall discomfort  -     ibuprofen (MOTRIN) 800 mg tablet; Take 1 Tab by mouth every eight (8) hours as needed for Pain.  -     budesonide-formoterol (SYMBICORT) 80-4.5 mcg/actuation HFAA; Take 2 Puffs by inhalation two (2) times a day. 7. Cough  -     budesonide-formoterol (SYMBICORT) 80-4.5 mcg/actuation HFAA;  Take 2 Puffs by inhalation two (2) times a day. Follow-up Disposition:  Return in about 3 months (around 1/10/2018) for Follow up.

## 2017-10-13 ENCOUNTER — TELEPHONE (OUTPATIENT)
Dept: INTERNAL MEDICINE CLINIC | Age: 72
End: 2017-10-13

## 2017-10-13 NOTE — TELEPHONE ENCOUNTER
Patient states her insurance will now cover Symbicort, so she would like for Dr. Kodak Agudelo to [place] [facility] [place]

## 2017-10-15 LAB
ALBUMIN SERPL-MCNC: 4.2 G/DL (ref 3.5–4.8)
ALBUMIN/GLOB SERPL: 1.8 {RATIO} (ref 1.2–2.2)
ALP SERPL-CCNC: 74 IU/L (ref 39–117)
ALT SERPL-CCNC: 14 IU/L (ref 0–32)
AST SERPL-CCNC: 18 IU/L (ref 0–40)
BILIRUB SERPL-MCNC: 0.3 MG/DL (ref 0–1.2)
BUN SERPL-MCNC: 11 MG/DL (ref 8–27)
BUN/CREAT SERPL: 13 (ref 12–28)
CALCIUM SERPL-MCNC: 9 MG/DL (ref 8.7–10.3)
CHLORIDE SERPL-SCNC: 103 MMOL/L (ref 96–106)
CHOLEST SERPL-MCNC: 167 MG/DL (ref 100–199)
CO2 SERPL-SCNC: 24 MMOL/L (ref 18–29)
CREAT SERPL-MCNC: 0.86 MG/DL (ref 0.57–1)
GLOBULIN SER CALC-MCNC: 2.3 G/DL (ref 1.5–4.5)
GLUCOSE SERPL-MCNC: 97 MG/DL (ref 65–99)
HDLC SERPL-MCNC: 73 MG/DL
LDLC SERPL CALC-MCNC: 82 MG/DL (ref 0–99)
POTASSIUM SERPL-SCNC: 4 MMOL/L (ref 3.5–5.2)
PROT SERPL-MCNC: 6.5 G/DL (ref 6–8.5)
SODIUM SERPL-SCNC: 143 MMOL/L (ref 134–144)
TRIGL SERPL-MCNC: 59 MG/DL (ref 0–149)
VLDLC SERPL CALC-MCNC: 12 MG/DL (ref 5–40)

## 2017-10-16 DIAGNOSIS — R05.9 COUGH: ICD-10-CM

## 2017-10-16 DIAGNOSIS — R07.89 CHEST WALL DISCOMFORT: ICD-10-CM

## 2017-10-16 RX ORDER — BUDESONIDE AND FORMOTEROL FUMARATE DIHYDRATE 80; 4.5 UG/1; UG/1
2 AEROSOL RESPIRATORY (INHALATION) 2 TIMES DAILY
Qty: 3 INHALER | Refills: 1 | Status: SHIPPED | OUTPATIENT
Start: 2017-10-16 | End: 2018-10-22 | Stop reason: ALTCHOICE

## 2017-12-14 ENCOUNTER — OFFICE VISIT (OUTPATIENT)
Dept: NEUROLOGY | Age: 72
End: 2017-12-14

## 2017-12-14 VITALS
DIASTOLIC BLOOD PRESSURE: 82 MMHG | RESPIRATION RATE: 14 BRPM | HEART RATE: 74 BPM | BODY MASS INDEX: 32.25 KG/M2 | SYSTOLIC BLOOD PRESSURE: 128 MMHG | OXYGEN SATURATION: 96 % | HEIGHT: 59 IN | WEIGHT: 160 LBS

## 2017-12-14 DIAGNOSIS — G93.0 CYST OF BRAIN: ICD-10-CM

## 2017-12-14 DIAGNOSIS — G25.0 ESSENTIAL TREMOR: ICD-10-CM

## 2017-12-14 DIAGNOSIS — G40.209 CRYPTOGENIC PARTIAL COMPLEX EPILEPSY (HCC): Primary | ICD-10-CM

## 2017-12-14 RX ORDER — LAMOTRIGINE 200 MG/1
200 TABLET ORAL DAILY
Qty: 60 TAB | Refills: 2 | Status: SHIPPED | OUTPATIENT
Start: 2017-12-14 | End: 2018-03-19 | Stop reason: ALTCHOICE

## 2017-12-14 NOTE — PROGRESS NOTES
Patient is here for follow up for neuralgia  Patient had episode this past Saturday, got a fullness in head and then total weakness  Couldn't walk straight, lasted about 30-45 min.   Ramirez Born asleep for about 2 hours, got up and was a little better but pain in calfs

## 2017-12-14 NOTE — PROGRESS NOTES
Chief Complaint   Patient presents with    Neurologic Problem         HISTORY OF PRESENT ILLNESS  Santos Valladares came back for follow up. She reports 1 episode where she just froze briefly. She was at home with her son and just could not move or respond to him. It lasted for about a minute and then she was tired and sleepy afterwards. No witnessed convulsion or postictal state. 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 She is taking lamotrigine 150 mg twice a day. She had an EEG for evaluation of spells where she had alteration of awareness and could not respond to people. EEG showed epileptiform activity intermittently out of the left temporal head region. She was also having occasional weakness in the right upper and lower extremity. She has never had any witnessed generalized convulsion. 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 This was stable on her last scan compared to the one 2 years prior. She has been noticing tremor in her hands mainly on the right side especially when she tries to write. No resting tremor. No difficulties with mobility or ambulation. No falls. History reviewed. No pertinent past medical history. Current Outpatient Prescriptions   Medication Sig    lamoTRIgine (LAMICTAL) 200 mg tablet Take 1 Tab by mouth daily.  simvastatin (ZOCOR) 20 mg tablet TAKE 1 TABLET EVERY NIGHT    budesonide-formoterol (SYMBICORT) 80-4.5 mcg/actuation HFAA Take 2 Puffs by inhalation two (2) times a day.  ibuprofen (MOTRIN) 800 mg tablet Take 1 Tab by mouth every eight (8) hours as needed for Pain.  CARTIA  mg ER capsule TAKE 1 CAPSULE EVERY DAY    budesonide-formoterol (SYMBICORT) 160-4.5 mcg/actuation HFA inhaler Take 2 Puffs by inhalation two (2) times a day.     albuterol (PROAIR HFA) 90 mcg/actuation inhaler Take 1 Puff by inhalation every four (4) hours as needed for Wheezing or Shortness of Breath.  mometasone-formoterol (DULERA) 100-5 mcg/actuation HFA inhaler Take 2 Puffs by inhalation two (2) times a day.  cyclobenzaprine (FLEXERIL) 5 mg tablet Take 1 Tab by mouth three (3) times daily as needed for Muscle Spasm(s). Indications: MUSCLE SPASM     No current facility-administered medications for this visit. PHYSICAL EXAMINATION:    Visit Vitals    /82    Pulse 74    Resp 14    Ht 4' 11\" (1.499 m)    Wt 72.6 kg (160 lb)    SpO2 96%    BMI 32.32 kg/m2       NEUROLOGICAL EXAMINATION:     Mental Status:   Alert and oriented to person, place, and time with recent and remote memory intact. Attention span and concentration are normal. Speech is fluent with a full fund of knowledge. Cranial Nerves:    II, III, IV, VI:  Visual acuity grossly intact. Visual fields are normal.    Pupils are equal, round, and reactive to light and accommodation. Extra-ocular movements are full and fluid. Fundoscopic exam was benign, no ptosis or nystagmus. V-XII: Hearing is grossly intact. Facial features are symmetric, with normal sensation and strength. The palate rises symmetrically and the tongue protrudes midline. Sternocleidomastoids 5/5. Motor Examination: Slight weakness of the  strength on the right and slight weakness of the great toe extension on the right. Normal tone, bulk, and strength on the left side. No cogwheel rigidity or clonus present. Sensory exam:  Normal throughout to pinprick, temperature, and vibration sense. Normal proprioception. Coordination:  Finger to nose and rapid arm movement testing was normal.  Fine, 9-10 Hz n postural tremor was noted in the fingers of the right hand when arms were outstretched. No resting tremor    Gait and Station:  Steady. Normal arm swing. No Rhomberg or pronator drift. No muscle wasting or fasiculations noted. Reflexes:  DTRs 2+ throughout. Toes downgoing.         LABS / IMAGING  Lab Results Component Value Date/Time    Sodium 143 10/14/2017 08:05 AM    Potassium 4.0 10/14/2017 08:05 AM    Chloride 103 10/14/2017 08:05 AM    CO2 24 10/14/2017 08:05 AM    Anion gap 8 09/22/2010 03:33 PM    Glucose 97 10/14/2017 08:05 AM    BUN 11 10/14/2017 08:05 AM    Creatinine 0.86 10/14/2017 08:05 AM    BUN/Creatinine ratio 13 10/14/2017 08:05 AM    GFR est AA 79 10/14/2017 08:05 AM    GFR est non-AA 69 10/14/2017 08:05 AM    Calcium 9.0 10/14/2017 08:05 AM    Bilirubin, total 0.3 10/14/2017 08:05 AM    AST (SGOT) 18 10/14/2017 08:05 AM    Alk. phosphatase 74 10/14/2017 08:05 AM    Protein, total 6.5 10/14/2017 08:05 AM    Albumin 4.2 10/14/2017 08:05 AM    Globulin 3.0 09/22/2010 03:33 PM    A-G Ratio 1.8 10/14/2017 08:05 AM    ALT (SGPT) 14 10/14/2017 08:05 AM     Lab Results   Component Value Date/Time    WBC 11.8 10/18/2016 07:23 AM    HGB 13.9 10/18/2016 07:23 AM    HCT 40.9 10/18/2016 07:23 AM    PLATELET 999 46/19/4670 07:23 AM    MCV 91 10/18/2016 07:23 AM     Last Lamotrigine level was 6.2    ASSESSMENT    ICD-10-CM ICD-9-CM    1. 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 Essential tremor G25.0 333.1        DISCUSSION  Ms. Corazon Dawson has had episodes where she could not respond to move. Complex partial seizures are suspected given abnormal EEG. She has had one episode since last seen. In the past she has had episodes related to stress and there has been a concern that some of these episodes may be nonepileptic. I have recommended increasing the dose of lamotrigine to 200 mg twice a day  If the episodes continue, will consider EMU. 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 Scans have been stable over the past 2-3 years. She has benign essential tremor involving her arms/hands.   This does not seem to be bothering her and wishes to defer any other pharmacologic therapy. The asthma medications including steroid and beta agonists can exacerbate or unmask the tremor.      Continue periodic follow-up    Tanmay Talbot MD  Diplomate, American Board of Psychiatry & Neurology (Neurology)  [facility] Board of Psychiatry & Neurology (Clinical Neurophysiology)  Diplomate, American Board of Electrodiagnostic Medicine

## 2017-12-14 NOTE — MR AVS SNAPSHOT
Visit Information Date & Time Provider Department Dept. Phone Encounter #  
 12/14/2017 10:40 AM Eliza Dasilva MD [facility] at [address] [id number] Follow-up Instructions Return in about 3 months (around 3/14/2018). Your Appointments 1/10/2018 10:00 AM  
ROUTINE CARE with Cynthia Feldman MD  
[facility] Internal Medicine [facility]) Appt Note: 3 month f/u  
 [address] [facility] [address] [facility] [address]  
[address] [address] [address] [place] Upcoming Health Maintenance Date Due FOBT Q 1 YEAR AGE 50-75 11/21/1996 GLAUCOMA SCREENING Q2Y 11/21/2011 MEDICARE YEARLY EXAM 10/11/2018 DTaP/Tdap/Td series (2 - Td) 11/17/2024 Allergies as of 12/14/2017  Review Complete On: 12/14/2017 By: Eliza Dasilva MD  
  
 Severity Noted Reaction Type Reactions Prednisone  11/09/2016    Other (comments) Shakiness and headache Current Immunizations  Never Reviewed Name Date Tdap 11/17/2014 Not reviewed this visit You Were Diagnosed With   
  
 Codes Comments Cryptogenic partial complex epilepsy [facility]    -  Primary ICD-10-CM: O44.092 ICD-9-CM: 345.50 Cyst of brain     ICD-10-CM: G93.0 ICD-9-CM: 348.0 Essential tremor     ICD-10-CM: G25.0 ICD-9-CM: 333.1 Vitals BP Pulse Resp Height(growth percentile) Weight(growth percentile) SpO2  
 128/82 74 14 4' 11\" (1.499 m) 160 lb (72.6 kg) 96% BMI OB Status Smoking Status 32.32 kg/m2 Postmenopausal Current Every Day Smoker Vitals History BMI and BSA Data Body Mass Index Body Surface Area  
 32.32 kg/m 2 1.74 m 2 Preferred Pharmacy Pharmacy Name Phone [id number], [address] [place] [address] [phone] Your Updated Medication List  
  
   
This list is accurate as of: 12/14/17 10:52 AM.  Always use your most recent med list.  
  
  
  
  
 albuterol 90 mcg/actuation inhaler Commonly known as:  PROAIR HFA Take 1 Puff by inhalation every four (4) hours as needed for Wheezing or Shortness of Breath. CARTIA  mg ER capsule Generic drug:  dilTIAZem CD  
TAKE 1 CAPSULE EVERY DAY  
  
 cyclobenzaprine 5 mg tablet Commonly known as:  FLEXERIL Take 1 Tab by mouth three (3) times daily as needed for Muscle Spasm(s). Indications: MUSCLE SPASM  
  
 ibuprofen 800 mg tablet Commonly known as:  MOTRIN Take 1 Tab by mouth every eight (8) hours as needed for Pain.  
  
 lamoTRIgine 200 mg tablet Commonly known as: LaMICtal  
Take 1 Tab by mouth daily. mometasone-formoterol 100-5 mcg/actuation HFA inhaler Commonly known as:  Darnell John Take 2 Puffs by inhalation two (2) times a day. simvastatin 20 mg tablet Commonly known as:  ZOCOR  
TAKE 1 TABLET EVERY NIGHT  
  
 * SYMBICORT 160-4.5 mcg/actuation Hfaa Generic drug:  budesonide-formoterol Take 2 Puffs by inhalation two (2) times a day. * budesonide-formoterol 80-4.5 mcg/actuation Hfaa Commonly known as:  SYMBICORT Take 2 Puffs by inhalation two (2) times a day. * Notice: This list has 2 medication(s) that are the same as other medications prescribed for you. Read the directions carefully, and ask your doctor or other care provider to review them with you. Prescriptions Sent to Pharmacy Refills  
 lamoTRIgine (LAMICTAL) 200 mg tablet 2 Sig: Take 1 Tab by mouth daily. Class: Normal  
 Pharmacy: [address] [place] [address], [address] [place] [address] #: [phone] Route: Oral  
  
Follow-up Instructions Return in about 3 months (around 3/14/2018). Patient Instructions A Healthy Lifestyle: Care Instructions Your Care Instructions A healthy lifestyle can help you feel good, stay at a healthy weight, and have plenty of energy for both work and play.  A healthy lifestyle is something you can share with your whole family. A healthy lifestyle also can lower your risk for serious health problems, such as high blood pressure, heart disease, and diabetes. You can follow a few steps listed below to improve your health and the health of your family. Follow-up care is a key part of your treatment and safety. Be sure to make and go to all appointments, and call your doctor if you are having problems. It's also a good idea to know your test results and keep a list of the medicines you take. How can you care for yourself at home? · Do not eat too much sugar, fat, or fast foods. You can still have dessert and treats now and then. The goal is moderation. · Start small to improve your eating habits. Pay attention to portion sizes, drink less juice and soda pop, and eat more fruits and vegetables. ¨ Eat a healthy amount of food. A 3-ounce serving of meat, for example, is about the size of a deck of cards. Fill the rest of your plate with vegetables and whole grains. ¨ Limit the amount of soda and sports drinks you have every day. Drink more water when you are thirsty. ¨ Eat at least 5 servings of fruits and vegetables every day. It may seem like a lot, but it is not hard to reach this goal. A serving or helping is 1 piece of fruit, 1 cup of vegetables, or 2 cups of leafy, raw vegetables. Have an apple or some carrot sticks as an afternoon snack instead of a candy bar. Try to have fruits and/or vegetables at every meal. 
· Make exercise part of your daily routine. You may want to start with simple activities, such as walking, bicycling, or slow swimming. Try to be active 30 to 60 minutes every day. You do not need to do all 30 to 60 minutes all at once. For example, you can exercise 3 times a day for 10 or 20 minutes.  Moderate exercise is safe for most people, but it is always a good idea to talk to your doctor before starting an exercise program. 
 · Keep moving. Stephenie García the lawn, work in the garden, or [employer]. Take the stairs instead of the elevator at work. · If you smoke, quit. People who smoke have an increased risk for heart attack, stroke, cancer, and other lung illnesses. Quitting is hard, but there are ways to boost your chance of quitting tobacco for good. ¨ Use nicotine gum, patches, or lozenges. ¨ Ask your doctor about stop-smoking programs and medicines. ¨ Keep trying. In addition to reducing your risk of diseases in the future, you will notice some benefits soon after you stop using tobacco. If you have shortness of breath or asthma symptoms, they will likely get better within a few weeks after you quit. · Limit how much alcohol you drink. Moderate amounts of alcohol (up to 2 drinks a day for men, 1 drink a day for women) are okay. But drinking too much can lead to liver problems, high blood pressure, and other health problems. Family health If you have a family, there are many things you can do together to improve your health. · Eat meals together as a family as often as possible. · Eat healthy foods. This includes fruits, vegetables, lean meats and dairy, and whole grains. · Include your family in your fitness plan. Most people think of activities such as jogging or tennis as the way to fitness, but there are many ways you and your family can be more active. Anything that makes you breathe hard and gets your heart pumping is exercise. Here are some tips: 
¨ Walk to do errands or to take your child to school or the bus. ¨ Go for a family bike ride after dinner instead of watching TV. Where can you learn more? Go to http://bennett-sangita.info/. Enter I179 in the search box to learn more about \"A Healthy Lifestyle: Care Instructions. \" Current as of: May 12, 2017 Content Version: 11.4 © 7725-0587 Healthwise, Incorporated.  Care instructions adapted under license by [address] (which disclaims liability or warranty for this information). If you have questions about a medical condition or this instruction, always ask your healthcare professional. [address] any warranty or liability for your use of this information. Introducing [facility] & HEALTH SERVICES! Stephanie Loyolacarla introduces [employer] patient portal. Now you can access parts of your medical record, email your doctor's office, and request medication refills online. 1. In your internet browser, go to [email or web address] 2. Click on the First Time User? Click Here link in the Sign In box. You will see the New Member Sign Up page. 3. Enter your [employer] Access Code exactly as it appears below. You will not need to use this code after youve completed the sign-up process. If you do not sign up before the expiration date, you must request a new code. · [employer] Access Code: [email or web address] Expires: 1/8/2018  8:49 AM 
 
4. Enter the last four digits of your Social Security Number (xxxx) and Date of Birth (mm/dd/yyyy) as indicated and click Submit. You will be taken to the next sign-up page. 5. Create a [employer] ID. This will be your [employer] login ID and cannot be changed, so think of one that is secure and easy to remember. 6. Create a [employer] password. You can change your password at any time. 7. Enter your Password Reset Question and Answer. This can be used at a later time if you forget your password. 8. Enter your e-mail address. You will receive e-mail notification when new information is available in [address] 9. Click Sign Up. You can now view and download portions of your medical record. 10. Click the Download Summary menu link to download a portable copy of your medical information. If you have questions, please visit the Frequently Asked Questions section of the [employer] website.  Remember, [employer] is NOT to be used for urgent needs. For medical emergencies, dial 911. Now available from your iPhone and Android! Please provide this summary of care documentation to your next provider. Your primary care clinician is listed as Shanta Saini. If you have any questions after today's visit, please call [phone].

## 2017-12-14 NOTE — PATIENT INSTRUCTIONS

## 2017-12-18 ENCOUNTER — OFFICE VISIT (OUTPATIENT)
Dept: INTERNAL MEDICINE CLINIC | Age: 72
End: 2017-12-18

## 2017-12-18 ENCOUNTER — TELEPHONE (OUTPATIENT)
Dept: NEUROLOGY | Age: 72
End: 2017-12-18

## 2017-12-18 VITALS
TEMPERATURE: 97.4 F | BODY MASS INDEX: 32.17 KG/M2 | HEART RATE: 65 BPM | DIASTOLIC BLOOD PRESSURE: 90 MMHG | OXYGEN SATURATION: 95 % | HEIGHT: 59 IN | WEIGHT: 159.6 LBS | SYSTOLIC BLOOD PRESSURE: 160 MMHG | RESPIRATION RATE: 21 BRPM

## 2017-12-18 DIAGNOSIS — G40.209 CRYPTOGENIC PARTIAL COMPLEX EPILEPSY (HCC): Primary | ICD-10-CM

## 2017-12-18 DIAGNOSIS — T50.905A ADVERSE EFFECT OF DRUG, INITIAL ENCOUNTER: ICD-10-CM

## 2017-12-18 NOTE — PROGRESS NOTES
Follow Up Visit    Eleni Lowery is a 71 y.o. female. she presents for Hospital Follow Up and Fatigue    The patient comes to follow-up her recent visit to the hospital.  She was seen at [facility] ER for an episode of alteration of consciousness. On 15 December, the patient was preparing to drive away from her home immediately after having taken Lamictal.  Of note, the patient had been seen by neurology the previous day and was advised at that time to increase her Lamictal dosage to 200 mg twice a day from 150 mg twice a day. She had taken that dose the night previously (on the 14th) however, on the 15th, she took her usual 150 mg. Shortly after taking the medication, the patient began to feel very lightheaded. As she drove towards the end of her driveway, she felt quite disoriented and stopped the car before entering traffic. Her family then came to her as she was stopped in the driveway and they noted that she was quite altered and had vomited. After being taken to the ER, labs were obtained which did not show any significant abnormality. An MRI was performed which the patient and her daughter-in-law both note was unchanged from any previous testing. The patient was discharged from the ER and comes here today to follow-up. Since the ED visit, she reports feeling well. There have been no further changes over the weekend. Patient Active Problem List   Diagnosis Code    HTN (hypertension) I10    Tachyarrhythmia R00.0    Mitral valve prolapse I34.1    Hyperlipidemia E78.5    Obesity (BMI 30.0-34. 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 Authorizing Provider   lamoTRIgine (LAMICTAL) 200 mg tablet Take 1 Tab by mouth daily.  12/14/17  Yes Nicci Wilson MD   simvastatin (ZOCOR) 20 mg tablet TAKE 1 TABLET EVERY NIGHT 12/1/17 Yes Eliceo Guerrier MD   budesonide-formoterol (SYMBICORT) 80-4.5 mcg/actuation HFAA Take 2 Puffs by inhalation two (2) times a day. 10/16/17  Yes Eliceo Guerrier MD   ibuprofen (MOTRIN) 800 mg tablet Take 1 Tab by mouth every eight (8) hours as needed for Pain. 10/10/17  Yes Eliceo Guerrier MD   CARTIA  mg ER capsule TAKE 1 CAPSULE EVERY DAY 7/28/17  Yes Eliceo Guerrier MD   budesonide-formoterol [facility]) 160-4.5 mcg/actuation HFA inhaler Take 2 Puffs by inhalation two (2) times a day. Yes Historical Provider   albuterol (PROAIR HFA) 90 mcg/actuation inhaler Take 1 Puff by inhalation every four (4) hours as needed for Wheezing or Shortness of Breath. 4/5/17  Yes Eliceo Guerrier MD   mometasone-formoterol (DULERA) 100-5 mcg/actuation HFA inhaler Take 2 Puffs by inhalation two (2) times a day. 3/10/17  Yes Eliceo Guerrier MD   cyclobenzaprine (FLEXERIL) 5 mg tablet Take 1 Tab by mouth three (3) times daily as needed for Muscle Spasm(s). Indications: MUSCLE SPASM 1/6/17   Eliceo Guerrier MD         Health Maintenance   Topic Date Due    FOBT Q 1 YEAR AGE 50-75  11/21/1996    GLAUCOMA SCREENING Q2Y  11/21/2011    MEDICARE YEARLY EXAM  10/11/2018    DTaP/Tdap/Td series (2 - Td) 11/17/2024    Hepatitis C Screening  Completed    OSTEOPOROSIS SCREENING (DEXA)  Completed    ZOSTER VACCINE AGE 60>  Addressed    Pneumococcal 65+ Low/Medium Risk  Addressed    Influenza Age 5 to Adult  Addressed       Review of Systems   Constitutional: Negative. Respiratory: Negative. Cardiovascular: Negative. Visit Vitals    /90 (BP 1 Location: Right arm, BP Patient Position: Sitting)    Pulse 65    Temp 97.4 °F (36.3 °C) (Oral)    Resp 21    Ht 4' 11\" (1.499 m)    Wt 159 lb 9.6 oz (72.4 kg)    SpO2 95%    BMI 32.24 kg/m2       Physical Exam   Constitutional: No distress. Cardiovascular: Normal rate and regular rhythm. No murmur heard.   Pulmonary/Chest: Effort normal and breath sounds normal.         ASSESSMENT/PLAN    Diagnoses and all orders for this visit:    1. Cryptogenic partial complex epilepsy ([facility] [address] --on review of the history, I question whether or not this episode represents an actual seizure. I will defer to neurology for further evaluation and potentially an EEG. 2. Adverse effect of drug, initial encounter-likewise, the also could represent an adverse reaction due to her increased dose of Lamictal.  Again, she will see neurology tomorrow which may help to elucidate the answers to these questions. Follow-up Disposition:  Return if symptoms worsen or fail to improve.

## 2017-12-18 NOTE — TELEPHONE ENCOUNTER
Pt said she needs to see Dr. Tyshawn Chan as soon as possible because she was admitted to the hospital on Friday for extreme case of vertigo. Pt said she is still lightheaded.  Please call back

## 2017-12-19 ENCOUNTER — OFFICE VISIT (OUTPATIENT)
Dept: NEUROLOGY | Age: 72
End: 2017-12-19

## 2017-12-19 VITALS
SYSTOLIC BLOOD PRESSURE: 130 MMHG | DIASTOLIC BLOOD PRESSURE: 80 MMHG | WEIGHT: 159 LBS | HEART RATE: 78 BPM | OXYGEN SATURATION: 96 % | RESPIRATION RATE: 14 BRPM | BODY MASS INDEX: 32.05 KG/M2 | HEIGHT: 59 IN

## 2017-12-19 DIAGNOSIS — G40.209 CRYPTOGENIC PARTIAL COMPLEX EPILEPSY (HCC): Primary | ICD-10-CM

## 2017-12-19 DIAGNOSIS — H81.10 BENIGN PAROXYSMAL POSITIONAL VERTIGO, UNSPECIFIED LATERALITY: ICD-10-CM

## 2017-12-19 NOTE — MR AVS SNAPSHOT
Visit Information Date & Time Provider Department Dept. Phone Encounter #  
 12/19/2017  8:15 AM MD [facility] at [address] [id number] Your Appointments 1/10/2018 10:00 AM  
ROUTINE CARE with Cherie Arnold MD  
[facility] Appt Note: 3 month f/u  
 [address] [place] [address] [facility] [address]  
[address] [facility] [address]  
  
    
 3/19/2018 11:40 AM  
Follow Up with MD [facility] at [address] [facility] Appt Note: 3 month follow up KRU 12/14 [address] [facility] [address] [facility] [address]  
[phone]  
  
   
 [address] [facility]  [address] Upcoming Health Maintenance Date Due FOBT Q 1 YEAR AGE 50-75 11/21/1996 GLAUCOMA SCREENING Q2Y 11/21/2011 MEDICARE YEARLY EXAM 10/11/2018 DTaP/Tdap/Td series (2 - Td) 11/17/2024 Allergies as of 12/19/2017  Review Complete On: 12/19/2017 By: Luis Gan MD  
  
 Severity Noted Reaction Type Reactions Prednisone  11/09/2016    Other (comments) Shakiness and headache Current Immunizations  Never Reviewed Name Date Tdap 11/17/2014 Not reviewed this visit You Were Diagnosed With   
  
 Codes Comments Cryptogenic partial complex epilepsy ([facility]    -  Primary ICD-10-CM: V21.222 ICD-9-CM: 345.50 Benign paroxysmal positional vertigo, unspecified laterality     ICD-10-CM: H81.10 ICD-9-CM: 386.11 Vitals BP Pulse Resp Height(growth percentile) Weight(growth percentile) SpO2  
 130/80 78 14 4' 11\" (1.499 m) 159 lb (72.1 kg) 96% BMI OB Status Smoking Status 32.11 kg/m2 Postmenopausal Current Every Day Smoker Vitals History BMI and BSA Data  Body Mass Index Body Surface Area  
 32.11 kg/m 2 1.73 m 2  
  
  
 Preferred Pharmacy Pharmacy Name Phone [id number] [phone] Your Updated Medication List  
  
   
This list is accurate as of: 12/19/17  8:50 AM.  Always use your most recent med list.  
  
  
  
  
 albuterol 90 mcg/actuation inhaler Commonly known as:  PROAIR HFA Take 1 Puff by inhalation every four (4) hours as needed for Wheezing or Shortness of Breath. CARTIA  mg ER capsule Generic drug:  dilTIAZem CD  
TAKE 1 CAPSULE EVERY DAY  
  
 cyclobenzaprine 5 mg tablet Commonly known as:  FLEXERIL Take 1 Tab by mouth three (3) times daily as needed for Muscle Spasm(s). Indications: MUSCLE SPASM  
  
 ibuprofen 800 mg tablet Commonly known as:  MOTRIN Take 1 Tab by mouth every eight (8) hours as needed for Pain.  
  
 lamoTRIgine 200 mg tablet Commonly known as: LaMICtal  
Take 1 Tab by mouth daily. mometasone-formoterol 100-5 mcg/actuation HFA inhaler Commonly known as:  Michaelle Hever Take 2 Puffs by inhalation two (2) times a day. simvastatin 20 mg tablet Commonly known as:  ZOCOR  
TAKE 1 TABLET EVERY NIGHT  
  
 * SYMBICORT 160-4.5 mcg/actuation Hfaa Generic drug:  budesonide-formoterol Take 2 Puffs by inhalation two (2) times a day. * budesonide-formoterol 80-4.5 mcg/actuation Hfaa Commonly known as:  SYMBICORT Take 2 Puffs by inhalation two (2) times a day. * Notice: This list has 2 medication(s) that are the same as other medications prescribed for you. Read the directions carefully, and ask your doctor or other care provider to review them with you. Patient Instructions Dizziness: Care Instructions Your Care Instructions Dizziness is the feeling of unsteadiness or fuzziness in your head. It is different than having vertigo, which is a feeling that the room is spinning or that you are moving or falling.  It is also different from lightheadedness, which is the feeling that you are about to faint. It can be hard to know what causes dizziness. Some people feel dizzy when they have migraine headaches. Sometimes bouts of flu can make you feel dizzy. Some medical conditions, such as heart problems or high blood pressure, can make you feel dizzy. Many medicines can cause dizziness, including medicines for high blood pressure, pain, or anxiety. If a medicine causes your symptoms, your doctor may recommend that you stop or change the medicine. If it is a problem with your heart, you may need medicine to help your heart work better. If there is no clear reason for your symptoms, your doctor may suggest watching and waiting for a while to see if the dizziness goes away on its own. Follow-up care is a key part of your treatment and safety. Be sure to make and go to all appointments, and call your doctor if you are having problems. It's also a good idea to know your test results and keep a list of the medicines you take. How can you care for yourself at home? · If your doctor recommends or prescribes medicine, take it exactly as directed. Call your doctor if you think you are having a problem with your medicine. · Do not drive while you feel dizzy. · Try to prevent falls. Steps you can take include: ¨ Using nonskid mats, adding grab bars near the tub, and using night-lights. ¨ Clearing your home so that walkways are free of anything you might trip on. ¨ Letting family and friends know that you have been feeling dizzy. This will help them know how to help you. When should you call for help? Call 911 anytime you think you may need emergency care. For example, call if: 
? · You passed out (lost consciousness). ? · You have dizziness along with symptoms of a heart attack. These may include: ¨ Chest pain or pressure, or a strange feeling in the chest. 
¨ Sweating. ¨ Shortness of breath. ¨ Nausea or vomiting. ¨ Pain, pressure, or a strange feeling in the back, neck, jaw, or upper belly or in one or both shoulders or arms. ¨ Lightheadedness or sudden weakness. ¨ A fast or irregular heartbeat. ? · You have symptoms of a stroke. These may include: 
¨ Sudden numbness, tingling, weakness, or loss of movement in your face, arm, or leg, especially on only one side of your body. ¨ Sudden vision changes. ¨ Sudden trouble speaking. ¨ Sudden confusion or trouble understanding simple statements. ¨ Sudden problems with walking or balance. ¨ A sudden, severe headache that is different from past headaches. ?Call your doctor now or seek immediate medical care if: 
? · You feel dizzy and have a fever, headache, or ringing in your ears. ? · You have new or increased nausea and vomiting. ? · Your dizziness does not go away or comes back. ? Watch closely for changes in your health, and be sure to contact your doctor if: 
? · You do not get better as expected. Where can you learn more? Go to http://bennett-sangita.info/. Enter L890 in the search box to learn more about \"Dizziness: Care Instructions. \" Current as of: March 20, 2017 Content Version: 11.4 © 8777-1848 [employer]. Care instructions adapted under license by [employer] (which disclaims liability or warranty for this information). If you have questions about a medical condition or this instruction, always ask your healthcare professional. [place] [address] any warranty or liability for your use of this information. Introducing [facility] & HEALTH SERVICES! Pan Rose introduces [employer] patient portal. Now you can access parts of your medical record, email your doctor's office, and request medication refills online. 1. In your internet browser, go to [email or web address]/[email or web address] 2. Click on the First Time User? Click Here link in the Sign In box. You will see the New Member Sign Up page. 3. Enter your [employer] Access Code exactly as it appears below. You will not need to use this code after youve completed the sign-up process. If you do not sign up before the expiration date, you must request a new code. · [employer] Access Code: [email or web address] Expires: 1/8/2018  8:49 AM 
 
4. Enter the last four digits of your Social Security Number (xxxx) and Date of Birth (mm/dd/yyyy) as indicated and click Submit. You will be taken to the next sign-up page. 5. Create a [employer] ID. This will be your [employer] login ID and cannot be changed, so think of one that is secure and easy to remember. 6. Create a [employer] password. You can change your password at any time. 7. Enter your Password Reset Question and Answer. This can be used at a later time if you forget your password. 8. Enter your e-mail address. You will receive e-mail notification when new information is available in [address] 9. Click Sign Up. You can now view and download portions of your medical record. 10. Click the Download Summary menu link to download a portable copy of your medical information. If you have questions, please visit the Frequently Asked Questions section of the [employer] website. Remember, [employer] is NOT to be used for urgent needs. For medical emergencies, dial 911. Now available from your iPhone and Android! Please provide this summary of care documentation to your next provider. Your primary care clinician is listed as Leopoldo Lawn. If you have any questions after today's visit, please call [phone].

## 2017-12-19 NOTE — PROGRESS NOTES
Chief Complaint   Patient presents with    Dizziness         HISTORY OF PRESENT ILLNESS  Anaid Lux came back for follow up. On December 15, she had an episode where she had to go to the emergency department. She woke up feeling fine in the morning, took a lamotrigine pill, walk to her car and had just gotten to the end of her driveway. She looked right and then left and when she looked left, she abruptly felt dizzy and thinks started to shift in front of her eyes. She was also feeling somewhat nauseous and actually vomited some phlegm. She turned the car off, tried calling her son who did not , then called her daughter-in-law and told her that she is not feeling good and needs help. They came over and tried to recline her back which really aggravated her symptoms. She was taken to the hospital and had basic lab work which was negative. She continued to experience dizziness which was exclusively positional and then eventually by the end of the day she started to feel better. It still bothers her if she moves too quickly. 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 She is now taking lamotrigine 200 mg twice a day. She had an EEG for evaluation of spells where she had alteration of awareness and could not respond to people. EEG showed epileptiform activity intermittently out of the left temporal head region. She was also having occasional weakness in the right upper and lower extremity. She has never had any witnessed generalized convulsion. 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 This was stable on her last scan compared to the one 2 years prior. She has been noticing tremor in her hands mainly on the right side especially when she tries to write. No resting tremor. No difficulties with mobility or ambulation. No falls. History reviewed. No pertinent past medical history.   Current Outpatient Prescriptions Medication Sig    lamoTRIgine (LAMICTAL) 200 mg tablet Take 1 Tab by mouth daily.  simvastatin (ZOCOR) 20 mg tablet TAKE 1 TABLET EVERY NIGHT    budesonide-formoterol (SYMBICORT) 80-4.5 mcg/actuation HFAA Take 2 Puffs by inhalation two (2) times a day.  ibuprofen (MOTRIN) 800 mg tablet Take 1 Tab by mouth every eight (8) hours as needed for Pain.  CARTIA  mg ER capsule TAKE 1 CAPSULE EVERY DAY    budesonide-formoterol (SYMBICORT) 160-4.5 mcg/actuation HFA inhaler Take 2 Puffs by inhalation two (2) times a day.  albuterol (PROAIR HFA) 90 mcg/actuation inhaler Take 1 Puff by inhalation every four (4) hours as needed for Wheezing or Shortness of Breath.  mometasone-formoterol (DULERA) 100-5 mcg/actuation HFA inhaler Take 2 Puffs by inhalation two (2) times a day.  cyclobenzaprine (FLEXERIL) 5 mg tablet Take 1 Tab by mouth three (3) times daily as needed for Muscle Spasm(s). Indications: MUSCLE SPASM     No current facility-administered medications for this visit. PHYSICAL EXAMINATION:    Visit Vitals    /80    Pulse 78    Resp 14    Ht 4' 11\" (1.499 m)    Wt 72.1 kg (159 lb)    SpO2 96%    BMI 32.11 kg/m2       NEUROLOGICAL EXAMINATION:     Mental Status:   Alert and oriented to person, place, and time with recent and remote memory intact. Attention span and concentration are normal. Speech is fluent with a full fund of knowledge. Cranial Nerves:    II, III, IV, VI:  Visual acuity grossly intact. Visual fields are normal.    Pupils are equal, round, and reactive to light and accommodation. Extra-ocular movements are full and fluid. Fundoscopic exam was benign, no ptosis or nystagmus. V-XII: Hearing is grossly intact. Facial features are symmetric, with normal sensation and strength. The palate rises symmetrically and the tongue protrudes midline. Sternocleidomastoids 5/5.       Motor Examination: Slight weakness of the  strength on the right and slight weakness of the great toe extension on the right. Normal tone, bulk, and strength on the left side. No cogwheel rigidity or clonus present. Sensory exam:  Normal throughout to pinprick, temperature, and vibration sense. Normal proprioception. Coordination:  Finger to nose and rapid arm movement testing was normal.  Fine, 9-10 Hz n postural tremor was noted in the fingers of the right hand when arms were outstretched. No resting tremor    Gait and Station:  Steady. Normal arm swing. No Rhomberg or pronator drift. No muscle wasting or fasiculations noted. Reflexes:  DTRs 2+ throughout. Toes downgoing. LABS / IMAGING  Lab Results   Component Value Date/Time    Sodium 143 10/14/2017 08:05 AM    Potassium 4.0 10/14/2017 08:05 AM    Chloride 103 10/14/2017 08:05 AM    CO2 24 10/14/2017 08:05 AM    Anion gap 8 09/22/2010 03:33 PM    Glucose 97 10/14/2017 08:05 AM    BUN 11 10/14/2017 08:05 AM    Creatinine 0.86 10/14/2017 08:05 AM    BUN/Creatinine ratio 13 10/14/2017 08:05 AM    GFR est AA 79 10/14/2017 08:05 AM    GFR est non-AA 69 10/14/2017 08:05 AM    Calcium 9.0 10/14/2017 08:05 AM    Bilirubin, total 0.3 10/14/2017 08:05 AM    AST (SGOT) 18 10/14/2017 08:05 AM    Alk. phosphatase 74 10/14/2017 08:05 AM    Protein, total 6.5 10/14/2017 08:05 AM    Albumin 4.2 10/14/2017 08:05 AM    Globulin 3.0 09/22/2010 03:33 PM    A-G Ratio 1.8 10/14/2017 08:05 AM    ALT (SGPT) 14 10/14/2017 08:05 AM     Lab Results   Component Value Date/Time    WBC 11.8 10/18/2016 07:23 AM    HGB 13.9 10/18/2016 07:23 AM    HCT 40.9 10/18/2016 07:23 AM    PLATELET 366 45/11/9085 07:23 AM    MCV 91 10/18/2016 07:23 AM     Last Lamotrigine level was 6.2    ASSESSMENT    ICD-10-CM ICD-9-CM    1. Cryptogenic partial complex epilepsy ([facility].) G40.219 345.50    2.  Benign paroxysmal positional vertigo, unspecified laterality H81.10 386.11        DISCUSSION  Ms. Mesfin Dumont has had episodes where she could not respond to move.  Complex partial seizures are suspected given abnormal EEG. She is doing well from seizure standpoint and should continue lamotrigine 200 mg twice a day. Some of these episodes in the past were related to stress and there has been a concern that these may be nonepileptic. We may consider EMU in the future. The most recent episode that she went to the hospital for, seems to be benign paroxysmal positional vertigo. She still has some residual positional symptoms. Modified Epley exercises were performed and she may continue to do these on her own. No additional neurological workup is needed. 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 Scans have been stable over the past 2-3 years. She has benign essential tremor involving her arms/hands. It sometimes affects her head as well. This does not seem to be bothering her and wishes to defer any other pharmacologic therapy. The asthma medications including steroid and beta agonists can exacerbate or unmask the tremor.      Continue periodic follow-up    Archana James MD  Diplomate, American Board of Psychiatry & Neurology (Neurology)  [place] Board of Psychiatry & Neurology (Clinical Neurophysiology)  Diplomate, American Board of Electrodiagnostic Medicine

## 2017-12-19 NOTE — PATIENT INSTRUCTIONS
Dizziness: Care Instructions  Your Care Instructions  Dizziness is the feeling of unsteadiness or fuzziness in your head. It is different than having vertigo, which is a feeling that the room is spinning or that you are moving or falling. It is also different from lightheadedness, which is the feeling that you are about to faint. It can be hard to know what causes dizziness. Some people feel dizzy when they have migraine headaches. Sometimes bouts of flu can make you feel dizzy. Some medical conditions, such as heart problems or high blood pressure, can make you feel dizzy. Many medicines can cause dizziness, including medicines for high blood pressure, pain, or anxiety. If a medicine causes your symptoms, your doctor may recommend that you stop or change the medicine. If it is a problem with your heart, you may need medicine to help your heart work better. If there is no clear reason for your symptoms, your doctor may suggest watching and waiting for a while to see if the dizziness goes away on its own. Follow-up care is a key part of your treatment and safety. Be sure to make and go to all appointments, and call your doctor if you are having problems. It's also a good idea to know your test results and keep a list of the medicines you take. How can you care for yourself at home? · If your doctor recommends or prescribes medicine, take it exactly as directed. Call your doctor if you think you are having a problem with your medicine. · Do not drive while you feel dizzy. · Try to prevent falls. Steps you can take include:  ¨ Using nonskid mats, adding grab bars near the tub, and using night-lights. ¨ Clearing your home so that walkways are free of anything you might trip on. ¨ Letting family and friends know that you have been feeling dizzy. This will help them know how to help you. When should you call for help? Call 911 anytime you think you may need emergency care.  For example, call if:  ? · You passed out (lost consciousness). ? · You have dizziness along with symptoms of a heart attack. These may include:  ¨ Chest pain or pressure, or a strange feeling in the chest.  ¨ Sweating. ¨ Shortness of breath. ¨ Nausea or vomiting. ¨ Pain, pressure, or a strange feeling in the back, neck, jaw, or upper belly or in one or both shoulders or arms. ¨ Lightheadedness or sudden weakness. ¨ A fast or irregular heartbeat. ? · You have symptoms of a stroke. These may include:  ¨ Sudden numbness, tingling, weakness, or loss of movement in your face, arm, or leg, especially on only one side of your body. ¨ Sudden vision changes. ¨ Sudden trouble speaking. ¨ Sudden confusion or trouble understanding simple statements. ¨ Sudden problems with walking or balance. ¨ A sudden, severe headache that is different from past headaches. ?Call your doctor now or seek immediate medical care if:  ? · You feel dizzy and have a fever, headache, or ringing in your ears. ? · You have new or increased nausea and vomiting. ? · Your dizziness does not go away or comes back. ? Watch closely for changes in your health, and be sure to contact your doctor if:  ? · You do not get better as expected. Where can you learn more? Go to http://bennett-sangita.info/. Enter F675 in the search box to learn more about \"Dizziness: Care Instructions. \"  Current as of: March 20, 2017  Content Version: 11.4  © 4946-7800 [employer]. Care instructions adapted under license by [employer] (which disclaims liability or warranty for this information). If you have questions about a medical condition or this instruction, always ask your healthcare professional. [place] [address] any warranty or liability for your use of this information.

## 2018-01-10 ENCOUNTER — OFFICE VISIT (OUTPATIENT)
Dept: INTERNAL MEDICINE CLINIC | Age: 73
End: 2018-01-10

## 2018-01-10 VITALS
DIASTOLIC BLOOD PRESSURE: 80 MMHG | SYSTOLIC BLOOD PRESSURE: 140 MMHG | HEART RATE: 80 BPM | OXYGEN SATURATION: 93 % | WEIGHT: 158.4 LBS | HEIGHT: 59 IN | TEMPERATURE: 97.5 F | RESPIRATION RATE: 20 BRPM | BODY MASS INDEX: 31.93 KG/M2

## 2018-01-10 DIAGNOSIS — R42 DIZZINESS: Primary | ICD-10-CM

## 2018-01-10 DIAGNOSIS — J00 ACUTE NASOPHARYNGITIS: ICD-10-CM

## 2018-01-10 DIAGNOSIS — G25.0 ESSENTIAL TREMOR: ICD-10-CM

## 2018-01-10 RX ORDER — MECLIZINE HYDROCHLORIDE 25 MG/1
25 TABLET ORAL
Qty: 30 TAB | Refills: 1 | Status: SHIPPED | OUTPATIENT
Start: 2018-01-10 | End: 2021-11-01 | Stop reason: SDUPTHER

## 2018-01-10 RX ORDER — AZITHROMYCIN 250 MG/1
250 TABLET, FILM COATED ORAL SEE ADMIN INSTRUCTIONS
Qty: 6 TAB | Refills: 0 | Status: SHIPPED | OUTPATIENT
Start: 2018-01-10 | End: 2018-01-15

## 2018-01-10 RX ORDER — MECLIZINE HYDROCHLORIDE 25 MG/1
TABLET ORAL
Refills: 0 | COMMUNITY
Start: 2017-12-16 | End: 2018-01-10 | Stop reason: SDUPTHER

## 2018-01-10 NOTE — MR AVS SNAPSHOT
Visit Information Date & Time Provider Department Dept. Phone Encounter #  
 1/10/2018 10:00 AM Foster Dunham MD [facility] Internal Medicine [phone] [id number] Your Appointments 3/19/2018 11:40 AM  
Follow Up with Grant Sharma MD  
[facility] at [facility] [address] [facility]) Appt Note: 3 month follow up KRU 12/14 [address] [place] [address]  
[phone]  
  
   
 [address] [address] Upcoming Health Maintenance Date Due FOBT Q 1 YEAR AGE 50-75 11/21/1996 BREAST CANCER SCRN MAMMOGRAM 3/31/2018 MEDICARE YEARLY EXAM 10/11/2018 GLAUCOMA SCREENING Q2Y 9/6/2019 DTaP/Tdap/Td series (2 - Td) 11/17/2024 Allergies as of 1/10/2018  Review Complete On: 1/10/2018 By: Foster Dunham MD  
  
 Severity Noted Reaction Type Reactions Prednisone  11/09/2016    Other (comments) Shakiness and headache Current Immunizations  Never Reviewed Name Date Tdap 11/17/2014 Not reviewed this visit You Were Diagnosed With   
  
 Codes Comments Dizziness    -  Primary ICD-10-CM: Q45 ICD-9-CM: 780.4 Essential tremor     ICD-10-CM: G25.0 ICD-9-CM: 333.1 Acute nasopharyngitis     ICD-10-CM: Anthony Alfonso ICD-9-CM: 631 Vitals BP Pulse Temp Resp Height(growth percentile) Weight(growth percentile) 140/80 (BP 1 Location: Right arm, BP Patient Position: Sitting) 80 97.5 °F (36.4 °C) (Oral) 20 4' 11\" (1.499 m) 158 lb 6.4 oz (71.8 kg) SpO2 BMI OB Status Smoking Status 93% 31.99 kg/m2 Postmenopausal Current Every Day Smoker Vitals History BMI and BSA Data Body Mass Index Body Surface Area 31.99 kg/m 2 1.73 m 2 Preferred Pharmacy Pharmacy Name Phone [id number], [address] [place] [address] [phone] Your Updated Medication List  
  
   
 This list is accurate as of: 1/10/18 10:21 AM.  Always use your most recent med list.  
  
  
  
  
 albuterol 90 mcg/actuation inhaler Commonly known as:  PROAIR HFA Take 1 Puff by inhalation every four (4) hours as needed for Wheezing or Shortness of Breath. azithromycin 250 mg tablet Commonly known as:  Shonda Martinis Take 1 Tab by mouth See Admin Instructions for 5 days. CARTIA  mg ER capsule Generic drug:  dilTIAZem CD  
TAKE 1 CAPSULE EVERY DAY  
  
 cyclobenzaprine 5 mg tablet Commonly known as:  FLEXERIL Take 1 Tab by mouth three (3) times daily as needed for Muscle Spasm(s). Indications: MUSCLE SPASM  
  
 ibuprofen 800 mg tablet Commonly known as:  MOTRIN Take 1 Tab by mouth every eight (8) hours as needed for Pain.  
  
 lamoTRIgine 200 mg tablet Commonly known as: LaMICtal  
Take 1 Tab by mouth daily. meclizine 25 mg tablet Commonly known as:  ANTIVERT Take 1 Tab by mouth three (3) times daily as needed. mometasone-formoterol 100-5 mcg/actuation HFA inhaler Commonly known as:  Brianna Nova Take 2 Puffs by inhalation two (2) times a day. simvastatin 20 mg tablet Commonly known as:  ZOCOR  
TAKE 1 TABLET EVERY NIGHT  
  
 * SYMBICORT 160-4.5 mcg/actuation Hfaa Generic drug:  budesonide-formoterol Take 2 Puffs by inhalation two (2) times a day. * budesonide-formoterol 80-4.5 mcg/actuation Hfaa Commonly known as:  SYMBICORT Take 2 Puffs by inhalation two (2) times a day. * Notice: This list has 2 medication(s) that are the same as other medications prescribed for you. Read the directions carefully, and ask your doctor or other care provider to review them with you. Prescriptions Sent to Pharmacy Refills  
 meclizine (ANTIVERT) 25 mg tablet 1 Sig: Take 1 Tab by mouth three (3) times daily as needed. Class: Normal  
 Pharmacy: [address] [place] [address], [address] [place] [address] #: [phone] Route: Oral  
 azithromycin (ZITHROMAX) 250 mg tablet 0 Sig: Take 1 Tab by mouth See Admin Instructions for 5 days. Class: Normal  
 Pharmacy: [facility], [address] [facility] #: [phone] Route: Oral  
  
Patient Instructions Dizziness: Care Instructions Your Care Instructions Dizziness is the feeling of unsteadiness or fuzziness in your head. It is different than having vertigo, which is a feeling that the room is spinning or that you are moving or falling. It is also different from lightheadedness, which is the feeling that you are about to faint. It can be hard to know what causes dizziness. Some people feel dizzy when they have migraine headaches. Sometimes bouts of flu can make you feel dizzy. Some medical conditions, such as heart problems or high blood pressure, can make you feel dizzy. Many medicines can cause dizziness, including medicines for high blood pressure, pain, or anxiety. If a medicine causes your symptoms, your doctor may recommend that you stop or change the medicine. If it is a problem with your heart, you may need medicine to help your heart work better. If there is no clear reason for your symptoms, your doctor may suggest watching and waiting for a while to see if the dizziness goes away on its own. Follow-up care is a key part of your treatment and safety. Be sure to make and go to all appointments, and call your doctor if you are having problems. It's also a good idea to know your test results and keep a list of the medicines you take. How can you care for yourself at home? · If your doctor recommends or prescribes medicine, take it exactly as directed. Call your doctor if you think you are having a problem with your medicine. · Do not drive while you feel dizzy. · Try to prevent falls. Steps you can take include: ¨ Using nonskid mats, adding grab bars near the tub, and using night-lights. ¨ Clearing your home so that walkways are free of anything you might trip on. ¨ Letting family and friends know that you have been feeling dizzy. This will help them know how to help you. When should you call for help? Call 911 anytime you think you may need emergency care. For example, call if: 
? · You passed out (lost consciousness). ? · You have dizziness along with symptoms of a heart attack. These may include: ¨ Chest pain or pressure, or a strange feeling in the chest. 
¨ Sweating. ¨ Shortness of breath. ¨ Nausea or vomiting. ¨ Pain, pressure, or a strange feeling in the back, neck, jaw, or upper belly or in one or both shoulders or arms. ¨ Lightheadedness or sudden weakness. ¨ A fast or irregular heartbeat. ? · You have symptoms of a stroke. These may include: 
¨ Sudden numbness, tingling, weakness, or loss of movement in your face, arm, or leg, especially on only one side of your body. ¨ Sudden vision changes. ¨ Sudden trouble speaking. ¨ Sudden confusion or trouble understanding simple statements. ¨ Sudden problems with walking or balance. ¨ A sudden, severe headache that is different from past headaches. ?Call your doctor now or seek immediate medical care if: 
? · You feel dizzy and have a fever, headache, or ringing in your ears. ? · You have new or increased nausea and vomiting. ? · Your dizziness does not go away or comes back. ? Watch closely for changes in your health, and be sure to contact your doctor if: 
? · You do not get better as expected. Where can you learn more? Go to http://bennett-sangita.info/. Enter R701 in the search box to learn more about \"Dizziness: Care Instructions. \" Current as of: March 20, 2017 Content Version: 11.4 © 3402-7962 [employer]. Care instructions adapted under license by [employer] (which disclaims liability or warranty for this information).  If you have questions about a medical condition or this instruction, always ask your healthcare professional. [place] [address] any warranty or liability for your use of this information. Introducing [facility]! Ginger Oliver introduces OWM patient portal. Now you can access parts of your medical record, email your doctor's office, and request medication refills online. 1. In your internet browser, go to [email or web address] 2. Click on the First Time User? Click Here link in the Sign In box. You will see the New Member Sign Up page. 3. Enter your OWM Access Code exactly as it appears below. You will not need to use this code after youve completed the sign-up process. If you do not sign up before the expiration date, you must request a new code. · OWM Access Code: [email or web address] Expires: 4/10/2018  9:50 AM 
 
4. Enter the last four digits of your Social Security Number (xxxx) and Date of Birth (mm/dd/yyyy) as indicated and click Submit. You will be taken to the next sign-up page. 5. Create a OWM ID. This will be your OWM login ID and cannot be changed, so think of one that is secure and easy to remember. 6. Create a OWM password. You can change your password at any time. 7. Enter your Password Reset Question and Answer. This can be used at a later time if you forget your password. 8. Enter your e-mail address. You will receive e-mail notification when new information is available in [address] 9. Click Sign Up. You can now view and download portions of your medical record. 10. Click the Download Summary menu link to download a portable copy of your medical information. If you have questions, please visit the Frequently Asked Questions section of the OWM website. Remember, OWM is NOT to be used for urgent needs. For medical emergencies, dial 911. Now available from your iPhone and Android! Please provide this summary of care documentation to your next provider. Your primary care clinician is listed as Amy Kimball. If you have any questions after today's visit, please call [phone].

## 2018-01-10 NOTE — PROGRESS NOTES
Follow Up Visit    Colleen Hyde is a 71 y.o. female. she presents for Cold Symptoms    URI Review  Colleen Hyde is a 71 y.o. female who is here to talk about cold symptoms that have been present for the previous 3 days. She in this time has had a runny nose and a cough. She denies fever or chills. She has had no associated symptoms and has not had a sick contact that she is aware of. The patient is still having some dizziness. She has had to use meclizine \"a couple of times\". She felt immediately better. Asking for refill of this medication. She has seen neurology, continue lamictal.     Patient Active Problem List   Diagnosis Code    HTN (hypertension) I10    Tachyarrhythmia R00.0    Mitral valve prolapse I34.1    Hyperlipidemia E78.5    Obesity (BMI 30.0-34. 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 Authorizing Provider   meclizine (ANTIVERT) 25 mg tablet TK 1 T PO TID PRF DIZZINESS 12/16/17  Yes Historical Provider   lamoTRIgine (LAMICTAL) 200 mg tablet Take 1 Tab by mouth daily. 12/14/17  Yes Tessa Ojeda MD   simvastatin (ZOCOR) 20 mg tablet TAKE 1 TABLET EVERY NIGHT 12/1/17  Yes Emry Sicard, MD   budesonide-formoterol [facility]) 80-4.5 mcg/actuation HFAA Take 2 Puffs by inhalation two (2) times a day. 10/16/17  Yes Emry Sicard, MD   ibuprofen (MOTRIN) 800 mg tablet Take 1 Tab by mouth every eight (8) hours as needed for Pain. 10/10/17  Yes Emry Sicard, MD   CARTIA  mg ER capsule TAKE 1 CAPSULE EVERY DAY 7/28/17  Yes Emry Sicard, MD   budesonide-formoterol [facility]) 160-4.5 mcg/actuation HFA inhaler Take 2 Puffs by inhalation two (2) times a day.    Yes Historical Provider   albuterol (PROAIR HFA) 90 mcg/actuation inhaler Take 1 Puff by inhalation every four (4) hours as needed for Wheezing or Shortness of Breath. 4/5/17  Yes Yumiko Shelton MD   mometasone-formoterol (DULERA) 100-5 mcg/actuation HFA inhaler Take 2 Puffs by inhalation two (2) times a day. 3/10/17  Yes Yumiko Shelton MD   cyclobenzaprine (FLEXERIL) 5 mg tablet Take 1 Tab by mouth three (3) times daily as needed for Muscle Spasm(s). Indications: MUSCLE SPASM 1/6/17   Yumkio Shelton MD         Health Maintenance   Topic Date Due    FOBT Q 1 YEAR AGE 50-75  11/21/1996    GLAUCOMA SCREENING Q2Y  11/21/2011    BREAST CANCER SCRN MAMMOGRAM  03/31/2018    MEDICARE YEARLY EXAM  10/11/2018    DTaP/Tdap/Td series (2 - Td) 11/17/2024    Hepatitis C Screening  Completed    OSTEOPOROSIS SCREENING (DEXA)  Completed    ZOSTER VACCINE AGE 60>  Addressed    Pneumococcal 65+ Low/Medium Risk  Addressed    Influenza Age 5 to Adult  Addressed       Review of Systems   Constitutional: Negative. Respiratory: Negative. Cardiovascular: Negative. Neurological: Positive for dizziness. Visit Vitals    /80 (BP 1 Location: Right arm, BP Patient Position: Sitting)    Pulse 80    Temp 97.5 °F (36.4 °C) (Oral)    Resp 20    Ht 4' 11\" (1.499 m)    Wt 158 lb 6.4 oz (71.8 kg)    SpO2 93%    BMI 31.99 kg/m2       Physical Exam   Constitutional: She is oriented to person, place, and time. No distress. Cardiovascular: Normal rate and regular rhythm. Pulmonary/Chest: Effort normal and breath sounds normal.   Neurological: She is alert and oriented to person, place, and time. ASSESSMENT/PLAN    Diagnoses and all orders for this visit:    1. Dizziness  -     meclizine (ANTIVERT) 25 mg tablet; Take 1 Tab by mouth three (3) times daily as needed. 2. Essential tremor    3. Acute nasopharyngitis  -     azithromycin (ZITHROMAX) 250 mg tablet; Take 1 Tab by mouth See Admin Instructions for 5 days.       Follow-up Disposition: Not on File

## 2018-01-10 NOTE — PATIENT INSTRUCTIONS
Dizziness: Care Instructions  Your Care Instructions  Dizziness is the feeling of unsteadiness or fuzziness in your head. It is different than having vertigo, which is a feeling that the room is spinning or that you are moving or falling. It is also different from lightheadedness, which is the feeling that you are about to faint. It can be hard to know what causes dizziness. Some people feel dizzy when they have migraine headaches. Sometimes bouts of flu can make you feel dizzy. Some medical conditions, such as heart problems or high blood pressure, can make you feel dizzy. Many medicines can cause dizziness, including medicines for high blood pressure, pain, or anxiety. If a medicine causes your symptoms, your doctor may recommend that you stop or change the medicine. If it is a problem with your heart, you may need medicine to help your heart work better. If there is no clear reason for your symptoms, your doctor may suggest watching and waiting for a while to see if the dizziness goes away on its own. Follow-up care is a key part of your treatment and safety. Be sure to make and go to all appointments, and call your doctor if you are having problems. It's also a good idea to know your test results and keep a list of the medicines you take. How can you care for yourself at home? · If your doctor recommends or prescribes medicine, take it exactly as directed. Call your doctor if you think you are having a problem with your medicine. · Do not drive while you feel dizzy. · Try to prevent falls. Steps you can take include:  ¨ Using nonskid mats, adding grab bars near the tub, and using night-lights. ¨ Clearing your home so that walkways are free of anything you might trip on. ¨ Letting family and friends know that you have been feeling dizzy. This will help them know how to help you. When should you call for help? Call 911 anytime you think you may need emergency care.  For example, call if:  ? · You passed out (lost consciousness). ? · You have dizziness along with symptoms of a heart attack. These may include:  ¨ Chest pain or pressure, or a strange feeling in the chest.  ¨ Sweating. ¨ Shortness of breath. ¨ Nausea or vomiting. ¨ Pain, pressure, or a strange feeling in the back, neck, jaw, or upper belly or in one or both shoulders or arms. ¨ Lightheadedness or sudden weakness. ¨ A fast or irregular heartbeat. ? · You have symptoms of a stroke. These may include:  ¨ Sudden numbness, tingling, weakness, or loss of movement in your face, arm, or leg, especially on only one side of your body. ¨ Sudden vision changes. ¨ Sudden trouble speaking. ¨ Sudden confusion or trouble understanding simple statements. ¨ Sudden problems with walking or balance. ¨ A sudden, severe headache that is different from past headaches. ?Call your doctor now or seek immediate medical care if:  ? · You feel dizzy and have a fever, headache, or ringing in your ears. ? · You have new or increased nausea and vomiting. ? · Your dizziness does not go away or comes back. ? Watch closely for changes in your health, and be sure to contact your doctor if:  ? · You do not get better as expected. Where can you learn more? Go to http://bennett-sangita.info/. Enter V704 in the search box to learn more about \"Dizziness: Care Instructions. \"  Current as of: March 20, 2017  Content Version: 11.4  © 2396-8820 [employer]. Care instructions adapted under license by [employer] (which disclaims liability or warranty for this information). If you have questions about a medical condition or this instruction, always ask your healthcare professional. [place] [address] any warranty or liability for your use of this information.

## 2018-01-15 ENCOUNTER — TELEPHONE (OUTPATIENT)
Dept: INTERNAL MEDICINE CLINIC | Age: 73
End: 2018-01-15

## 2018-01-15 RX ORDER — BENZONATATE 100 MG/1
100 CAPSULE ORAL
Qty: 30 CAP | Refills: 0 | Status: SHIPPED | OUTPATIENT
Start: 2018-01-15 | End: 2018-01-22

## 2018-01-15 NOTE — TELEPHONE ENCOUNTER
Pt seen on 1/10 -  Pt's cough has not gone away - Please send a script for cough to [facility] [place]. Pt going on a Cruise and wants to be over cough.   Advise - Pt - [phone]

## 2018-01-15 NOTE — TELEPHONE ENCOUNTER
Patient started her Z pack yesterday and she started coughing a lot yesterday , chest is hurting and cannot sleep from her coughing all night.  She would like something for the cough prior to cruise , please advise

## 2018-02-01 ENCOUNTER — TELEPHONE (OUTPATIENT)
Dept: INTERNAL MEDICINE CLINIC | Age: 73
End: 2018-02-01

## 2018-02-01 ENCOUNTER — OFFICE VISIT (OUTPATIENT)
Dept: FAMILY MEDICINE CLINIC | Age: 73
End: 2018-02-01

## 2018-02-01 ENCOUNTER — HOSPITAL ENCOUNTER (EMERGENCY)
Age: 73
Discharge: HOME | End: 2018-02-01
Attending: EMERGENCY MEDICINE
Payer: MEDICARE

## 2018-02-01 ENCOUNTER — APPOINTMENT (OUTPATIENT)
Dept: CT IMAGING | Age: 73
End: 2018-02-01
Attending: EMERGENCY MEDICINE
Payer: MEDICARE

## 2018-02-01 ENCOUNTER — APPOINTMENT (OUTPATIENT)
Dept: GENERAL RADIOLOGY | Age: 73
End: 2018-02-01
Attending: EMERGENCY MEDICINE
Payer: MEDICARE

## 2018-02-01 VITALS
HEIGHT: 60 IN | DIASTOLIC BLOOD PRESSURE: 71 MMHG | RESPIRATION RATE: 24 BRPM | WEIGHT: 161 LBS | HEART RATE: 94 BPM | TEMPERATURE: 98.3 F | BODY MASS INDEX: 31.61 KG/M2 | OXYGEN SATURATION: 95 % | SYSTOLIC BLOOD PRESSURE: 141 MMHG

## 2018-02-01 VITALS
HEART RATE: 86 BPM | DIASTOLIC BLOOD PRESSURE: 75 MMHG | TEMPERATURE: 96.7 F | RESPIRATION RATE: 20 BRPM | SYSTOLIC BLOOD PRESSURE: 134 MMHG | OXYGEN SATURATION: 96 % | BODY MASS INDEX: 32.46 KG/M2 | HEIGHT: 59 IN | WEIGHT: 161 LBS

## 2018-02-01 DIAGNOSIS — R06.02 SHORTNESS OF BREATH: ICD-10-CM

## 2018-02-01 DIAGNOSIS — R06.89 BREATHING DIFFICULTY: Primary | ICD-10-CM

## 2018-02-01 DIAGNOSIS — J20.9 ACUTE BRONCHITIS, UNSPECIFIED ORGANISM: Primary | ICD-10-CM

## 2018-02-01 DIAGNOSIS — R05.9 PAIN AGGRAVATED BY COUGHING AND DEEP BREATHING: ICD-10-CM

## 2018-02-01 DIAGNOSIS — R05.9 COUGH: ICD-10-CM

## 2018-02-01 DIAGNOSIS — J44.1 ACUTE EXACERBATION OF CHRONIC OBSTRUCTIVE PULMONARY DISEASE (COPD) (HCC): ICD-10-CM

## 2018-02-01 DIAGNOSIS — R52 PAIN AGGRAVATED BY COUGHING AND DEEP BREATHING: ICD-10-CM

## 2018-02-01 DIAGNOSIS — R06.2 WHEEZING: ICD-10-CM

## 2018-02-01 LAB
ALBUMIN SERPL-MCNC: 3.6 G/DL (ref 3.5–5)
ALBUMIN/GLOB SERPL: 1 {RATIO} (ref 1.1–2.2)
ALP SERPL-CCNC: 89 U/L (ref 45–117)
ALT SERPL-CCNC: 30 U/L (ref 12–78)
ANION GAP SERPL CALC-SCNC: 10 MMOL/L (ref 5–15)
AST SERPL-CCNC: 21 U/L (ref 15–37)
BASOPHILS # BLD: 0.1 K/UL (ref 0–0.1)
BASOPHILS NFR BLD: 1 % (ref 0–1)
BILIRUB SERPL-MCNC: 0.3 MG/DL (ref 0.2–1)
BUN SERPL-MCNC: 12 MG/DL (ref 6–20)
BUN/CREAT SERPL: 14 (ref 12–20)
CALCIUM SERPL-MCNC: 8.8 MG/DL (ref 8.5–10.1)
CHLORIDE SERPL-SCNC: 106 MMOL/L (ref 97–108)
CO2 SERPL-SCNC: 25 MMOL/L (ref 21–32)
CREAT SERPL-MCNC: 0.85 MG/DL (ref 0.55–1.02)
DIFFERENTIAL METHOD BLD: ABNORMAL
EOSINOPHIL # BLD: 1 K/UL (ref 0–0.4)
EOSINOPHIL NFR BLD: 12 % (ref 0–7)
ERYTHROCYTE [DISTWIDTH] IN BLOOD BY AUTOMATED COUNT: 13.6 % (ref 11.5–14.5)
FLUAV AG NPH QL IA: NEGATIVE
FLUBV AG NOSE QL IA: NEGATIVE
GLOBULIN SER CALC-MCNC: 3.6 G/DL (ref 2–4)
GLUCOSE SERPL-MCNC: 93 MG/DL (ref 65–100)
HCT VFR BLD AUTO: 40.3 % (ref 35–47)
HGB BLD-MCNC: 13.7 G/DL (ref 11.5–16)
IMM GRANULOCYTES # BLD: 0 K/UL (ref 0–0.04)
IMM GRANULOCYTES NFR BLD AUTO: 0 % (ref 0–0.5)
LYMPHOCYTES # BLD: 2.4 K/UL (ref 0.8–3.5)
LYMPHOCYTES NFR BLD: 29 % (ref 12–49)
MCH RBC QN AUTO: 31 PG (ref 26–34)
MCHC RBC AUTO-ENTMCNC: 34 G/DL (ref 30–36.5)
MCV RBC AUTO: 91.2 FL (ref 80–99)
MONOCYTES # BLD: 0.7 K/UL (ref 0–1)
MONOCYTES NFR BLD: 8 % (ref 5–13)
NEUTS SEG # BLD: 3.9 K/UL (ref 1.8–8)
NEUTS SEG NFR BLD: 49 % (ref 32–75)
NRBC # BLD: 0 K/UL (ref 0–0.01)
NRBC BLD-RTO: 0 PER 100 WBC
PLATELET # BLD AUTO: 298 K/UL (ref 150–400)
PMV BLD AUTO: 9.5 FL (ref 8.9–12.9)
POTASSIUM SERPL-SCNC: 3.6 MMOL/L (ref 3.5–5.1)
PROT SERPL-MCNC: 7.2 G/DL (ref 6.4–8.2)
RBC # BLD AUTO: 4.42 M/UL (ref 3.8–5.2)
SODIUM SERPL-SCNC: 141 MMOL/L (ref 136–145)
TROPONIN I SERPL-MCNC: <0.04 NG/ML
WBC # BLD AUTO: 8 K/UL (ref 3.6–11)

## 2018-02-01 PROCEDURE — 74011636320 HC RX REV CODE- 636/320: Performed by: EMERGENCY MEDICINE

## 2018-02-01 PROCEDURE — 74011000258 HC RX REV CODE- 258: Performed by: EMERGENCY MEDICINE

## 2018-02-01 PROCEDURE — 93005 ELECTROCARDIOGRAM TRACING: CPT

## 2018-02-01 PROCEDURE — 85025 COMPLETE CBC W/AUTO DIFF WBC: CPT | Performed by: EMERGENCY MEDICINE

## 2018-02-01 PROCEDURE — 71275 CT ANGIOGRAPHY CHEST: CPT

## 2018-02-01 PROCEDURE — 94640 AIRWAY INHALATION TREATMENT: CPT

## 2018-02-01 PROCEDURE — 99284 EMERGENCY DEPT VISIT MOD MDM: CPT

## 2018-02-01 PROCEDURE — 80053 COMPREHEN METABOLIC PANEL: CPT | Performed by: EMERGENCY MEDICINE

## 2018-02-01 PROCEDURE — 96374 THER/PROPH/DIAG INJ IV PUSH: CPT

## 2018-02-01 PROCEDURE — 71046 X-RAY EXAM CHEST 2 VIEWS: CPT

## 2018-02-01 PROCEDURE — 77030029684 HC NEB SM VOL KT MONA -A

## 2018-02-01 PROCEDURE — 84484 ASSAY OF TROPONIN QUANT: CPT | Performed by: EMERGENCY MEDICINE

## 2018-02-01 PROCEDURE — 36415 COLL VENOUS BLD VENIPUNCTURE: CPT | Performed by: EMERGENCY MEDICINE

## 2018-02-01 PROCEDURE — 87804 INFLUENZA ASSAY W/OPTIC: CPT | Performed by: EMERGENCY MEDICINE

## 2018-02-01 PROCEDURE — 74011000250 HC RX REV CODE- 250: Performed by: EMERGENCY MEDICINE

## 2018-02-01 PROCEDURE — 74011250636 HC RX REV CODE- 250/636: Performed by: EMERGENCY MEDICINE

## 2018-02-01 PROCEDURE — 96375 TX/PRO/DX INJ NEW DRUG ADDON: CPT

## 2018-02-01 RX ORDER — ALBUTEROL SULFATE 0.63 MG/3ML
0.63 SOLUTION RESPIRATORY (INHALATION)
Qty: 1 VIAL | Refills: 0
Start: 2018-02-01 | End: 2018-02-01 | Stop reason: CLARIF

## 2018-02-01 RX ORDER — ALBUTEROL SULFATE 0.83 MG/ML
SOLUTION RESPIRATORY (INHALATION)
Status: DISCONTINUED
Start: 2018-02-01 | End: 2018-02-02 | Stop reason: HOSPADM

## 2018-02-01 RX ORDER — HYDROCODONE POLISTIREX AND CHLORPHENIRAMINE POLISTIREX 10; 8 MG/5ML; MG/5ML
5 SUSPENSION, EXTENDED RELEASE ORAL
Qty: 60 ML | Refills: 0 | Status: SHIPPED | OUTPATIENT
Start: 2018-02-01 | End: 2018-02-16

## 2018-02-01 RX ORDER — METHYLPREDNISOLONE 4 MG/1
TABLET ORAL
Qty: 1 DOSE PACK | Refills: 0 | Status: SHIPPED | OUTPATIENT
Start: 2018-02-01 | End: 2018-02-16 | Stop reason: ALTCHOICE

## 2018-02-01 RX ORDER — KETOROLAC TROMETHAMINE 30 MG/ML
30 INJECTION, SOLUTION INTRAMUSCULAR; INTRAVENOUS
Status: COMPLETED | OUTPATIENT
Start: 2018-02-01 | End: 2018-02-01

## 2018-02-01 RX ORDER — ALBUTEROL SULFATE 0.83 MG/ML
2.5 SOLUTION RESPIRATORY (INHALATION) ONCE
Qty: 1 EACH | Refills: 0
Start: 2018-02-01 | End: 2018-02-01

## 2018-02-01 RX ORDER — AZITHROMYCIN 250 MG/1
TABLET, FILM COATED ORAL
Qty: 6 TAB | Refills: 0 | Status: SHIPPED | OUTPATIENT
Start: 2018-02-01 | End: 2018-02-16 | Stop reason: ALTCHOICE

## 2018-02-01 RX ORDER — LAMOTRIGINE 150 MG/1
TABLET ORAL
COMMUNITY
Start: 2018-01-28 | End: 2018-09-20 | Stop reason: SDUPTHER

## 2018-02-01 RX ORDER — IPRATROPIUM BROMIDE AND ALBUTEROL SULFATE 2.5; .5 MG/3ML; MG/3ML
SOLUTION RESPIRATORY (INHALATION)
Status: DISCONTINUED
Start: 2018-02-01 | End: 2018-02-02 | Stop reason: HOSPADM

## 2018-02-01 RX ORDER — DEXAMETHASONE SODIUM PHOSPHATE 10 MG/ML
10 INJECTION INTRAMUSCULAR; INTRAVENOUS
Status: COMPLETED | OUTPATIENT
Start: 2018-02-01 | End: 2018-02-01

## 2018-02-01 RX ORDER — SODIUM CHLORIDE 0.9 % (FLUSH) 0.9 %
10 SYRINGE (ML) INJECTION
Status: COMPLETED | OUTPATIENT
Start: 2018-02-01 | End: 2018-02-01

## 2018-02-01 RX ADMIN — DEXAMETHASONE SODIUM PHOSPHATE 10 MG: 10 INJECTION, SOLUTION INTRAMUSCULAR; INTRAVENOUS at 18:58

## 2018-02-01 RX ADMIN — ALBUTEROL SULFATE 1 DOSE: 2.5 SOLUTION RESPIRATORY (INHALATION) at 19:39

## 2018-02-01 RX ADMIN — ALBUTEROL SULFATE 1 DOSE: 2.5 SOLUTION RESPIRATORY (INHALATION) at 19:11

## 2018-02-01 RX ADMIN — Medication 10 ML: at 20:28

## 2018-02-01 RX ADMIN — SODIUM CHLORIDE 100 ML: 900 INJECTION, SOLUTION INTRAVENOUS at 20:28

## 2018-02-01 RX ADMIN — IOPAMIDOL 70 ML: 755 INJECTION, SOLUTION INTRAVENOUS at 20:28

## 2018-02-01 RX ADMIN — KETOROLAC TROMETHAMINE 30 MG: 30 INJECTION, SOLUTION INTRAMUSCULAR at 18:58

## 2018-02-01 NOTE — PROGRESS NOTES
Chief Complaint   Patient presents with    Breathing Problem     Patient came in SOB, slow gait, wheezing, stating it hurt in her chest and across her back to cough and take deep breaths.

## 2018-02-01 NOTE — ED NOTES
4:13 PM  I have just evaluated the patient. I have reviewed Her vital signs and determined there is currently no worsening in their condition or physical exam. I have talked with the patient and the family and advised them that I am the provider in triage and have ordered lab work, x rays and other diagnostic tests. I have advised them that we will try and get them to the back as soon as possible. I have also advised them that should they have a worsening condition or any problems before they are sent back to the main ED, to contact me or the triage nurse.       Carolyn Ibanez Pagé FNP-BC

## 2018-02-01 NOTE — ED TRIAGE NOTES
Pt arrives from the [address] [facility] clinic for cough and chest congestion. Complains of pain when taking a deep breath due to coughing. Has had this cough for 1.5 weeks. Pt short of breath in triage, 96% on room air. Pt has received 2 breathing treatments today, states they have helped.

## 2018-02-01 NOTE — ED PROVIDER NOTES
HPI Comments: Pt. Presents to the ER with complaints of chest pain, cough, and SOB. Pt. Says that she has had a runny nose and mild cough for over a week. However, beginning two days ago, her symptoms began to worsen considerably. Pt. Has a history of COPD. She began using her nebs for her SOB. She would get improvement of her sx with her nebs, but that improvement wound only last 2-3 hours. Pt. Says that her cough is non-productive. She notices considerable pain with coughing. Pt. Reports chills. No n/v/d. Pt. Nicolle Suárez to an urgent care facility. She received a neb and was sent to the ER for further care. No other complaints. Patient is a 71 y.o. female presenting with chest pain, shortness of breath, and cough. Chest Pain (Angina)    Associated symptoms include cough and shortness of breath. Pertinent negatives include no abdominal pain, no back pain, no dizziness, no fever, no nausea, no vomiting and no weakness. Shortness of Breath   Associated symptoms include cough and chest pain. Pertinent negatives include no fever, no rhinorrhea, no sore throat, no vomiting, no abdominal pain and no rash. Cough   Associated symptoms include chest pain, chills and shortness of breath. Pertinent negatives include no rhinorrhea, no sore throat, no nausea and no vomiting.         Past Medical History:   Diagnosis Date    Chronic obstructive pulmonary disease (HCC)     Hyperlipidemia     Seizures (HCC)        Past Surgical History:   Procedure Laterality Date    HX BREAST LUMPECTOMY      HX  SECTION      HX ORTHOPAEDIC      trigger finger    HX WISDOM TEETH EXTRACTION           Family History:   Problem Relation Age of Onset    Heart Disease Father     Stroke Father     Cancer Paternal Aunt      breast    Cancer Maternal Grandmother     Cancer Paternal Grandmother      colon    Cancer Son      colon    Cancer Paternal Aunt      breast       Social History     Social History    Marital status:      Spouse name: N/A    Number of children: N/A    Years of education: N/A     Occupational History    Not on file. Social History Main Topics    Smoking status: Current Every Day Smoker     Packs/day: 0.50     Years: 50.00     Types: Cigarettes    Smokeless tobacco: Never Used    Alcohol use No    Drug use: No    Sexual activity: No     Other Topics Concern    Not on file     Social History Narrative         ALLERGIES: Prednisone    Review of Systems   Constitutional: Positive for chills. Negative for fever. HENT: Negative for rhinorrhea and sore throat. Respiratory: Positive for cough and shortness of breath. Cardiovascular: Positive for chest pain. Gastrointestinal: Negative for abdominal pain, diarrhea, nausea and vomiting. Genitourinary: Negative for dysuria and urgency. Musculoskeletal: Negative for arthralgias and back pain. Skin: Negative for rash. Neurological: Negative for dizziness, weakness and light-headedness. Vitals:    02/01/18 1510   BP: 150/75   Pulse: 71   Resp: 25   Temp: 98.3 °F (36.8 °C)   SpO2: 96%   Weight: 73 kg (161 lb)   Height: 5' (1.524 m)            Physical Exam     Vital signs reviewed. Nursing notes reviewed.     Const:  No acute distress, well developed, well nourished  Head:  Atraumatic, normocephalic  Eyes:  PERRL, conjunctiva normal, no scleral icterus  Neck:  Supple, trachea midline  Cardiovascular:  RRR, no murmurs, no gallops, no rubs  Resp:  Moderate resp distress, increased work of breathing, diffuse wheezes, no rhonchi, no rales,  Abd:  Soft, non-tender, non-distended, no rebound, no guarding, no CVA tenderness  :  Deferred  MSK:  No pedal edema, normal ROM  Neuro:  Alert and oriented x3, no cranial nerve defect  Skin:  Warm, dry, intact  Psych: normal mood and affect, behavior is normal, judgement and thought content is normal        MDM  Number of Diagnoses or Management Options  Acute bronchitis, unspecified organism: Acute exacerbation of chronic obstructive pulmonary disease (COPD) ([facility] [address]):      Amount and/or Complexity of Data Reviewed  Clinical lab tests: ordered and reviewed  Tests in the radiology section of CPT®: ordered and reviewed  Review and summarize past medical records: yes    Patient Progress  Patient progress: stable    Pt. Presents to the ER with complaints of SOB and cough. Pt.'s sx are consistent with a COPD exacerbation. No PE or pneumonia on CT. Pt. Says that she feels much better at the time of discharge. She was able to walk around the ER without SOB. I will start her on azithromycin, tussionex, and a medrol dosepak. Pt. To f/u with her PCP or return to the ER with worsening sx.     ED Course       Procedures

## 2018-02-01 NOTE — PROGRESS NOTES
HISTORY OF PRESENT ILLNESS  Shanti Pastor is a 71 y.o. female. Patient reports cough started over 1 week ago and progressively worsened to increased wheezing and shortness of breath. Patient having difficulty finishing complete sentences in office. Son is with patient. Patient reports rib/chest pain worse when coughing or trying to take a deep breath. Denies fever. Reports history of bronchitis and COPD. Patient last took duoneb about 2 hours ago, but it only seemed to help for one hour. Past Medical History:   Diagnosis Date    Chronic obstructive pulmonary disease (HCC)     Hyperlipidemia     Seizures (HCC)        Visit Vitals    /75    Pulse 86    Temp 96.7 °F (35.9 °C) (Oral)    Resp 20    Ht 4' 11\" (1.499 m)    Wt 161 lb (73 kg)  Comment: unable to obtain    SpO2 96%    BMI 32.52 kg/m2       Breathing Problem   The history is provided by the patient. This is a new problem. The current episode started more than 1 week ago. The problem occurs constantly. The problem has been gradually worsening. Associated symptoms include shortness of breath. Treatments tried: albuterol. The treatment provided mild relief. Review of Systems   Constitutional: Positive for malaise/fatigue. Respiratory: Positive for cough, shortness of breath and wheezing. Physical Exam   Constitutional: She is oriented to person, place, and time. She appears well-developed and well-nourished. HENT:   Head: Normocephalic. Neck: Normal range of motion. Neck supple. Cardiovascular: Normal rate and regular rhythm. Pulmonary/Chest: She is in respiratory distress. She has decreased breath sounds (throughout). She has wheezes (throughout). Neurological: She is alert and oriented to person, place, and time. Skin: Skin is warm and dry. clammy   Psychiatric: She has a normal mood and affect. Wheezing still present but improved after albuterol neb treatment  ASSESSMENT and PLAN    ICD-10-CM ICD-9-CM    1. Breathing difficulty R06.89 786.09 ALBUTEROL, INHAL. SOL., FDA-APPROVED FINAL, NON-COMPOUND UNIT DOSE, 1 MG      INHAL RX, AIRWAY OBST/DX SPUTUM INDUCT      REFERRAL TO EMERGENCY DEPARTMENT   2. Shortness of breath R06.02 786.05 REFERRAL TO EMERGENCY DEPARTMENT   3. Cough R05 786.2 REFERRAL TO EMERGENCY DEPARTMENT   4. Wheezing R06.2 786.07 REFERRAL TO EMERGENCY DEPARTMENT   5.  Pain aggravated by coughing and deep breathing R52 780.96 REFERRAL TO EMERGENCY DEPARTMENT     Orders Placed This Encounter    INHAL RX, AIRWAY OBST/DX SPUTUM INDUCT ([id number])    REFERRAL TO EMERGENCY DEPARTMENT    DISCONTD: albuterol (ACCUNEB) 0.63 mg/3 mL nebulizer solution    albuterol (PROVENTIL VENTOLIN) 2.5 mg /3 mL (0.083 %) nebulizer solution    lamoTRIgine (LAMICTAL) 150 mg tablet

## 2018-02-01 NOTE — ED NOTES
4:30 PM  I have just evaluated the patient. I have reviewed Her vital signs and determined there is currently no worsening in their condition or physical exam. I have talked with the patient and the family and advised them that I am the provider in triage and have ordered lab work, x rays and other diagnostic tests. I have advised them that we will try and get them to the back as soon as possible. I have also advised them that should they have a worsening condition or any problems before they are sent back to the main ED, to contact me or the triage nurse.       Brian Quintanilla Pagé FNP-BC

## 2018-02-02 ENCOUNTER — PATIENT OUTREACH (OUTPATIENT)
Dept: INTERNAL MEDICINE CLINIC | Age: 73
End: 2018-02-02

## 2018-02-02 LAB
ATRIAL RATE: 75 BPM
CALCULATED P AXIS, ECG09: 56 DEGREES
CALCULATED R AXIS, ECG10: -60 DEGREES
CALCULATED T AXIS, ECG11: 58 DEGREES
DIAGNOSIS, 93000: NORMAL
P-R INTERVAL, ECG05: 146 MS
Q-T INTERVAL, ECG07: 386 MS
QRS DURATION, ECG06: 78 MS
QTC CALCULATION (BEZET), ECG08: 431 MS
VENTRICULAR RATE, ECG03: 75 BPM

## 2018-02-02 NOTE — PROGRESS NOTES
ED Discharge Follow-Up      Date/Time:  2018 8:45 AM    Top challenges reviewed with the provider:    1. 6.9 mm nodule superior segment LLL nodule - repeat CT Chest in 6 months     Method of communication with provider :chart routing    Patient went to [address] [place] [address] ED on 18 for SOB, cough, CP. The physician discharge summary was available at the time of outreach. Patient contacted within 1 days of discharge. Nurse Navigator(NN) contacted the patient by telephone to perform post hospital discharge assessment. Verified name and  with patient as identifiers. Provided introduction to self, and explanation of the Nurses Navigator role. Reviewed discharge instructions and red flags with patient, and patient verbalizes understanding. Patient given an opportunity to ask questions and does not have any further questions or concerns at this time. The patient agrees to contact the PCP office for questions related to their healthcare. NN provided contact information to the patient for future reference. Patients top problems:  1. Ineffective Breathing  2. Current daily smoker    Barriers to care? None    Medication:   Performed medication reconciliation with patient, and patient verbalizes understanding of administration of home medications. There were no barriers to obtaining medications identified at this time. New medications at discharge include Yes  Medication Sig Dispense Start Date End Date Auth. Provider   chlorpheniramine-HYDROcodone [facility]) 10-8 mg/5 mL suspension Take 5 mL by mouth every twelve (12) hours as needed for Cough. Max Daily Amount: 10 mL. 60 mL 2018  Ramos Laurent MD   azithromycin [facility]) 250 mg tablet Take two tablets today then one tablet daily 6 Tab 2018  Ramos Laurent MD   methylPREDNISolone (MEDROL, HARDEEP,) 4 mg tablet Take as directed.  1 Dose Pack 2018  Ramos Laurent MD     Does the patient have new prescription(s) to last until follow up with prescribing provider: Needs refill on albuterol neb    Prescription Medication total: >10 (pharmacy consult for polypharm needed?) no     Medication changes (dose adjustments or discontinued meds): Advance Care Planning:   Does patient have an Advance Directive:  yes  If no, was patient was offered the opportunity to discuss advance care planning:  NA     PCP/Specialist follow up: Patient scheduled to follow up with Marcus Mancia MD on 2/16/18. Goals      Improved Airway Exchange            2/2/18 - Pt's cough, SOB will improve within 1 week of discharge. Pt will be complete course of Medrol dose pack and Zithromax. Pt will be compliant with inhalers. Pt will monitor for inc cough, inc mucus production, fever. Pt will drink fluids to thin secretions. Pt will call provider with worsening sxs. Meghna Louie RN, BSN, [facility]  Ambulatory Nurse Navigator  SHREYA         Smoking Cessation            2/2/18 - Pt will decrease smoking within 3 months. Pt admits to smoking up to 1/2 PPD. \"States it depends on how long her dog stays outside. Sometimes it is only a couple puffs a day. \"    Meghna Louie RN, BSN, [facility]  Ambulatory Nurse Navigator  [facility]

## 2018-02-02 NOTE — DISCHARGE INSTRUCTIONS
Bronchitis: Care Instructions  Your Care Instructions    Bronchitis is inflammation of the bronchial tubes, which carry air to the lungs. The tubes swell and produce mucus, or phlegm. The mucus and inflamed bronchial tubes make you cough. You may have trouble breathing. Most cases of bronchitis are caused by viruses like those that cause colds. Antibiotics usually do not help and they may be harmful. Bronchitis usually develops rapidly and lasts about 2 to 3 weeks in otherwise healthy people. Follow-up care is a key part of your treatment and safety. Be sure to make and go to all appointments, and call your doctor if you are having problems. It's also a good idea to know your test results and keep a list of the medicines you take. How can you care for yourself at home? · Take all medicines exactly as prescribed. Call your doctor if you think you are having a problem with your medicine. · Get some extra rest.  · Take an over-the-counter pain medicine, such as acetaminophen (Tylenol), ibuprofen (Advil, Motrin), or naproxen (Aleve) to reduce fever and relieve body aches. Read and follow all instructions on the label. · Do not take two or more pain medicines at the same time unless the doctor told you to. Many pain medicines have acetaminophen, which is Tylenol. Too much acetaminophen (Tylenol) can be harmful. · Take an over-the-counter cough medicine that contains dextromethorphan to help quiet a dry, hacking cough so that you can sleep. Avoid cough medicines that have more than one active ingredient. Read and follow all instructions on the label. · Breathe moist air from a humidifier, hot shower, or sink filled with hot water. The heat and moisture will thin mucus so you can cough it out. · Do not smoke. Smoking can make bronchitis worse. If you need help quitting, talk to your doctor about stop-smoking programs and medicines. These can increase your chances of quitting for good.   When should you call for help? Call 911 anytime you think you may need emergency care. For example, call if:  ? · You have severe trouble breathing. ?Call your doctor now or seek immediate medical care if:  ? · You have new or worse trouble breathing. ? · You cough up dark brown or bloody mucus (sputum). ? · You have a new or higher fever. ? · You have a new rash. ? Watch closely for changes in your health, and be sure to contact your doctor if:  ? · You cough more deeply or more often, especially if you notice more mucus or a change in the color of your mucus. ? · You are not getting better as expected. Where can you learn more? Go to http://bennett-sangita.info/. Enter H333 in the search box to learn more about \"Bronchitis: Care Instructions. \"  Current as of: May 12, 2017  Content Version: 11.4  © 9856-7121 [employer]. Care instructions adapted under license by [employer] (which disclaims liability or warranty for this information). If you have questions about a medical condition or this instruction, always ask your healthcare professional. [place] [address] any warranty or liability for your use of this information. Chronic Obstructive Pulmonary Disease (COPD): Care Instructions  Your Care Instructions    Chronic obstructive pulmonary disease (COPD) is a general term for a group of lung diseases, including emphysema and chronic bronchitis. People with COPD have decreased airflow in and out of the lungs, which makes it hard to breathe. The airways also can get clogged with thick mucus. Cigarette smoking is a major cause of COPD. Although there is no cure for COPD, you can slow its progress. Following your treatment plan and taking care of yourself can help you feel better and live longer. Follow-up care is a key part of your treatment and safety. Be sure to make and go to all appointments, and call your doctor if you are having problems.  It's also a good idea to know your test results and keep a list of the medicines you take. How can you care for yourself at home? ?Staying healthy  ? · Do not smoke. This is the most important step you can take to prevent more damage to your lungs. If you need help quitting, talk to your doctor about stop-smoking programs and medicines. These can increase your chances of quitting for good. ? · Avoid colds and flu. Get a pneumococcal vaccine shot. If you have had one before, ask your doctor whether you need a second dose. Get the flu vaccine every fall. If you must be around people with colds or the flu, wash your hands often. ? · Avoid secondhand smoke, air pollution, and high altitudes. Also avoid cold, dry air and hot, humid air. Stay at home with your windows closed when air pollution is bad. ?Medicines and oxygen therapy  ? · Take your medicines exactly as prescribed. Call your doctor if you think you are having a problem with your medicine. ? · You may be taking medicines such as:  ¨ Bronchodilators. These help open your airways and make breathing easier. Bronchodilators are either short-acting (work for 6 to 9 hours) or long-acting (work for 24 hours). You inhale most bronchodilators, so they start to act quickly. Always carry your quick-relief inhaler with you in case you need it while you are away from home. ¨ Corticosteroids (prednisone, budesonide). These reduce airway inflammation. They come in pill or inhaled form. You must take these medicines every day for them to work well. ? · A spacer may help you get more inhaled medicine to your lungs. Ask your doctor or pharmacist if a spacer is right for you. If it is, ask how to use it properly. ? · Do not take any vitamins, over-the-counter medicine, or herbal products without talking to your doctor first.   ? · If your doctor prescribed antibiotics, take them as directed. Do not stop taking them just because you feel better.  You need to take the full course of antibiotics. ? · Oxygen therapy boosts the amount of oxygen in your blood and helps you breathe easier. Use the flow rate your doctor has recommended, and do not change it without talking to your doctor first.   Activity  ? · Get regular exercise. Walking is an easy way to get exercise. Start out slowly, and walk a little more each day. ? · Pay attention to your breathing. You are exercising too hard if you cannot talk while you are exercising. ? · Take short rest breaks when doing household chores and other activities. ? · Learn breathing methods-such as breathing through pursed lips-to help you become less short of breath. ? · If your doctor has not set you up with a pulmonary rehabilitation program, talk to him or her about whether rehab is right for you. Rehab includes exercise programs, education about your disease and how to manage it, help with diet and other changes, and emotional support. Diet  ? · Eat regular, healthy meals. Use bronchodilators about 1 hour before you eat to make it easier to eat. Eat several small meals instead of three large ones. Drink beverages at the end of the meal. Avoid foods that are hard to chew. ? · Eat foods that contain protein so that you do not lose muscle mass. ? · Talk with your doctor if you gain too much weight or if you lose weight without trying. ?Mental health  ? · Talk to your family, friends, or a therapist about your feelings. It is normal to feel frightened, angry, hopeless, helpless, and even guilty. Talking openly about bad feelings can help you cope. If these feelings last, talk to your doctor. When should you call for help? Call 911 anytime you think you may need emergency care. For example, call if:  ? · You have severe trouble breathing. ?Call your doctor now or seek immediate medical care if:  ? · You have new or worse trouble breathing. ? · You cough up blood. ? · You have a fever. ? Watch closely for changes in your health, and be sure to contact your doctor if:  ? · You cough more deeply or more often, especially if you notice more mucus or a change in the color of your mucus. ? · You have new or worse swelling in your legs or belly. ? · You are not getting better as expected. Where can you learn more? Go to http://bennett-sangita.info/. Angy Uribe in the search box to learn more about \"Chronic Obstructive Pulmonary Disease (COPD): Care Instructions. \"  Current as of: May 12, 2017  Content Version: 11.4  © 2927-6049 [employer]. Care instructions adapted under license by [employer] (which disclaims liability or warranty for this information). If you have questions about a medical condition or this instruction, always ask your healthcare professional. [address] any warranty or liability for your use of this information.

## 2018-02-02 NOTE — ED NOTES
Pt ambulated to and from restroom without difficulty;  Denies any SOB; RR even and unlabored; O2 maintained @ 93% with ambulation; MD aware

## 2018-02-05 ENCOUNTER — TELEPHONE (OUTPATIENT)
Dept: INTERNAL MEDICINE CLINIC | Age: 73
End: 2018-02-05

## 2018-02-05 NOTE — TELEPHONE ENCOUNTER
----- Message from Kristina Blank RN sent at 2/2/2018  9:20 AM EST -----  Regarding: med refill - neb  Spoke to patient. States she needs a refill on neb solution. Can someone on team 5 assist with this please? She went to ED yesterday and was treated for bronchitis. She was given z-miko, medrol dose pack, cough meds.       Thanks, reginald

## 2018-02-16 ENCOUNTER — OFFICE VISIT (OUTPATIENT)
Dept: INTERNAL MEDICINE CLINIC | Age: 73
End: 2018-02-16

## 2018-02-16 VITALS
SYSTOLIC BLOOD PRESSURE: 100 MMHG | OXYGEN SATURATION: 95 % | HEART RATE: 82 BPM | RESPIRATION RATE: 19 BRPM | DIASTOLIC BLOOD PRESSURE: 70 MMHG | TEMPERATURE: 97.8 F | BODY MASS INDEX: 30.09 KG/M2 | WEIGHT: 159.4 LBS | HEIGHT: 61 IN

## 2018-02-16 DIAGNOSIS — J44.9 CHRONIC OBSTRUCTIVE PULMONARY DISEASE, UNSPECIFIED COPD TYPE (HCC): Primary | ICD-10-CM

## 2018-02-16 NOTE — PROGRESS NOTES
Follow Up Visit    Leslye Wall is a 71 y.o. female. she presents for Cough and Fatigue    COUGH  Leslye Wall is here to talk about ongoing cough. This is follow up from her recent ED visit for COPD exacerbation. While in the ED, she was given steroids and nebulizer therapy with some improvement. She was eventually discharged with steroids as well as antibiotics. Since then, symptoms have been controlled better. Presently, the cough is non-productive, improving over time and is aggravated by cold air. She has a plan to see her pulmonologist in the coming months. Patient Active Problem List   Diagnosis Code    HTN (hypertension) I10    Tachyarrhythmia R00.0    Mitral valve prolapse I34.1    Hyperlipidemia E78.5    Obesity (BMI 30.0-34. 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 Authorizing Provider   lamoTRIgine (LAMICTAL) 150 mg tablet  1/28/18  Yes Historical Provider   chlorpheniramine-HYDROcodone (TUSSIONEX PENNKINETIC ER) 10-8 mg/5 mL suspension Take 5 mL by mouth every twelve (12) hours as needed for Cough. Max Daily Amount: 10 mL. 2/1/18  Yes Isamar Ruano MD   meclizine (ANTIVERT) 25 mg tablet Take 1 Tab by mouth three (3) times daily as needed. 1/10/18  Yes Rj Huerta MD   lamoTRIgine (LAMICTAL) 200 mg tablet Take 1 Tab by mouth daily. 12/14/17  Yes Bekah Lei MD   simvastatin (ZOCOR) 20 mg tablet TAKE 1 TABLET EVERY NIGHT 12/1/17  Yes Rj Huerta MD   budesonide-formoterol [facility]) 80-4.5 mcg/actuation HFAA Take 2 Puffs by inhalation two (2) times a day. 10/16/17  Yes Rj Huerta MD   ibuprofen (MOTRIN) 800 mg tablet Take 1 Tab by mouth every eight (8) hours as needed for Pain.  10/10/17  Yes Rj Huerta MD   CARTIA  mg ER capsule TAKE 1 CAPSULE EVERY DAY 7/28/17  Yes Rj Huerta MD budesonide-formoterol (SYMBICORT) 160-4.5 mcg/actuation HFA inhaler Take 2 Puffs by inhalation two (2) times a day. Yes Historical Provider   albuterol (PROAIR HFA) 90 mcg/actuation inhaler Take 1 Puff by inhalation every four (4) hours as needed for Wheezing or Shortness of Breath. 4/5/17  Yes Lucas Dubois MD   mometasone-formoterol (DULERA) 100-5 mcg/actuation HFA inhaler Take 2 Puffs by inhalation two (2) times a day. 3/10/17  Yes Lucas Dubois MD   cyclobenzaprine (FLEXERIL) 5 mg tablet Take 1 Tab by mouth three (3) times daily as needed for Muscle Spasm(s). Indications: MUSCLE SPASM 1/6/17   Lucas Dubois MD         Health Maintenance   Topic Date Due    FOBT Q 1 YEAR AGE 50-75  11/21/1996    BREAST CANCER SCRN MAMMOGRAM  03/31/2018    MEDICARE YEARLY EXAM  10/11/2018    GLAUCOMA SCREENING Q2Y  09/06/2019    DTaP/Tdap/Td series (2 - Td) 11/17/2024    Hepatitis C Screening  Completed    OSTEOPOROSIS SCREENING (DEXA)  Completed    ZOSTER VACCINE AGE 60>  Addressed    Pneumococcal 65+ Low/Medium Risk  Addressed    Influenza Age 5 to Adult  Addressed       Review of Systems   Constitutional: Negative. Respiratory: Negative. Cardiovascular: Negative. Gastrointestinal: Negative. Visit Vitals    /70 (BP 1 Location: Right arm, BP Patient Position: Sitting)    Pulse 82    Temp 97.8 °F (36.6 °C) (Oral)    Resp 19    Ht 5' 1\" (1.549 m)    Wt 159 lb 6.4 oz (72.3 kg)    SpO2 95%    BMI 30.12 kg/m2       Physical Exam   Constitutional: She is oriented to person, place, and time. No distress. Cardiovascular: Normal rate and regular rhythm. Pulmonary/Chest: Effort normal. She has rales (mild). Neurological: She is alert and oriented to person, place, and time. ASSESSMENT/PLAN    Diagnoses and all orders for this visit:    1. Chronic obstructive pulmonary disease, unspecified COPD type ([facility] [address] -advised patient to continue medications as ordered.   She also will continue her ordered inhalers. She has a follow-up scheduled with her pulmonologist and will keep this appointment. Lastly, she is attempting to continue smoking cessation. She is down to one half pack or less of cigarettes a day. Follow-up Disposition:  Return in about 4 months (around 6/16/2018) for Follow up.

## 2018-02-16 NOTE — MR AVS SNAPSHOT
[address] [facility] [address] [facility] 
[phone] Patient: Booker Grewal MRN: [id number] :1945 Visit Information Date & Time Provider Department Dept. Phone Encounter #  
 2018 10:00 AM Camelia Post MD Via [employer] [email or web address] [address] Internal Medicine [phone] [id number] Follow-up Instructions Return in about 4 months (around 2018) for Follow up. Your Appointments 3/19/2018 11:40 AM  
[facility] [address] [place] [address]) Appt Note: 3 month follow up KRU  [address] [place] [id number] [phone]  
  
   
 [address] [place] [id number]  
  
    
 4/10/2018 10:00 AM  
ROUTINE CARE with Camelia Post MD  
[facility]ia [email or web address] [address] Internal Medicine [address] [place] [address]) Appt Note: 3m fuv  
 [address] [facility] [address]  
[address] [facility] Upcoming Health Maintenance Date Due FOBT Q 1 YEAR AGE 50-75 1996 BREAST CANCER SCRN MAMMOGRAM 3/31/2018 MEDICARE YEARLY EXAM 10/11/2018 GLAUCOMA SCREENING Q2Y 2019 DTaP/Tdap/Td series (2 - Td) 2024 Allergies as of 2018  Review Complete On: 2018 By: Camelia Post MD  
  
 Severity Noted Reaction Type Reactions Prednisone  2016    Other (comments) Shakiness and headache Current Immunizations  Never Reviewed Name Date Tdap 2014 Not reviewed this visit You Were Diagnosed With   
  
 Codes Comments Chronic obstructive pulmonary disease, unspecified COPD type ([facility]    -  Primary ICD-10-CM: J44.9 ICD-9-CM: 781 Vitals BP Pulse Temp Resp Height(growth percentile) Weight(growth percentile)  100/70 (BP 1 Location: Right arm, BP Patient Position: Sitting) 82 97.8 °F (36.6 °C) (Oral) 19 5' 1\" (1.549 m) 159 lb 6.4 oz (72.3 kg) SpO2 BMI OB Status Smoking Status 95% 30.12 kg/m2 Postmenopausal Current Every Day Smoker Vitals History BMI and BSA Data Body Mass Index Body Surface Area  
 30.12 kg/m 2 1.76 m 2 Preferred Pharmacy Pharmacy Name Phone [id number] [address] [facility] [phone] Your Updated Medication List  
  
   
This list is accurate as of: 2/16/18 10:38 AM.  Always use your most recent med list.  
  
  
  
  
 albuterol 90 mcg/actuation inhaler Commonly known as:  PROAIR HFA Take 1 Puff by inhalation every four (4) hours as needed for Wheezing or Shortness of Breath. CARTIA  mg ER capsule Generic drug:  dilTIAZem CD  
TAKE 1 CAPSULE EVERY DAY  
  
 ibuprofen 800 mg tablet Commonly known as:  MOTRIN Take 1 Tab by mouth every eight (8) hours as needed for Pain. * lamoTRIgine 200 mg tablet Commonly known as: LaMICtal  
Take 1 Tab by mouth daily. * lamoTRIgine 150 mg tablet Commonly known as: LaMICtal  
  
 meclizine 25 mg tablet Commonly known as:  ANTIVERT Take 1 Tab by mouth three (3) times daily as needed. simvastatin 20 mg tablet Commonly known as:  ZOCOR  
TAKE 1 TABLET EVERY NIGHT  
  
 * SYMBICORT 160-4.5 mcg/actuation [facility] Generic drug:  budesonide-formoterol Take 2 Puffs by inhalation two (2) times a day. * budesonide-formoterol 80-4.5 mcg/actuation [facility] Commonly known as:  SYMBICORT Take 2 Puffs by inhalation two (2) times a day. * Notice: This list has 4 medication(s) that are the same as other medications prescribed for you. Read the directions carefully, and ask your doctor or other care provider to review them with you. Follow-up Instructions Return in about 4 months (around 6/16/2018) for Follow up. Introducing [facility] SERVICES! Lukas Wilkins introduces Garden Mate patient portal. Now you can access parts of your medical record, email your doctor's office, and request medication refills online. 1. In your internet browser, go to [email or web address] 2. Click on the First Time User? Click Here link in the Sign In box. You will see the New Member Sign Up page. 3. Enter your Garden Mate Access Code exactly as it appears below. You will not need to use this code after youve completed the sign-up process. If you do not sign up before the expiration date, you must request a new code. · Garden Mate Access Code: [email or web address] Expires: 4/10/2018  9:50 AM 
 
4. Enter the last four digits of your Social Security Number (xxxx) and Date of Birth (mm/dd/yyyy) as indicated and click Submit. You will be taken to the next sign-up page. 5. Create a Garden Mate ID. This will be your [employer] Mate login ID and cannot be changed, so think of one that is secure and easy to remember. 6. Create a [employer] Mate password. You can change your password at any time. 7. Enter your Password Reset Question and Answer. This can be used at a later time if you forget your password. 8. Enter your e-mail address. You will receive e-mail notification when new information is available in [address] 9. Click Sign Up. You can now view and download portions of your medical record. 10. Click the Download Summary menu link to download a portable copy of your medical information. If you have questions, please visit the Frequently Asked Questions section of the Garden Mate website. Remember, Garden Mate is NOT to be used for urgent needs. For medical emergencies, dial 911. Now available from your iPhone and Android! Please provide this summary of care documentation to your next provider. Your primary care clinician is listed as Michael Stout. If you have any questions after today's visit, please call [phone].

## 2018-02-23 NOTE — TELEPHONE ENCOUNTER
Pt needs Mail Order refills -  Only has 2 pills left so [facility] need [facility]  Please advise - [phone]

## 2018-02-26 RX ORDER — DILTIAZEM HYDROCHLORIDE 120 MG/1
CAPSULE, COATED, EXTENDED RELEASE ORAL
Qty: 90 CAP | Refills: 1 | Status: SHIPPED | OUTPATIENT
Start: 2018-02-26 | End: 2018-09-28 | Stop reason: SDUPTHER

## 2018-02-26 RX ORDER — DILTIAZEM HYDROCHLORIDE 120 MG/1
CAPSULE, COATED, EXTENDED RELEASE ORAL
Qty: 30 CAP | Refills: 1 | Status: SHIPPED | OUTPATIENT
Start: 2018-02-26 | End: 2018-02-26 | Stop reason: SDUPTHER

## 2018-03-05 ENCOUNTER — DOCUMENTATION ONLY (OUTPATIENT)
Dept: INTERNAL MEDICINE CLINIC | Age: 73
End: 2018-03-05

## 2018-03-05 NOTE — PROGRESS NOTES
Patient has graduated from the Transitions of Care Coordination  program on 3/5/18. Patient's symptoms are stable at this time. Patient/family has the ability to self-manage. Care management goals have been completed at this time. No further nurse navigator follow up scheduled. Pt has nurse navigator's contact information for any further questions, concerns, or needs.   Patients upcoming visits:  Future Appointments  Date Time Provider Carmella Jordan   3/19/2018 11:40 AM MD NAKITA Bernard JEOVANY BOBO   5/24/2018 10:15 AM Yadira Loredo MD [address] [facility]

## 2018-03-15 ENCOUNTER — TELEPHONE (OUTPATIENT)
Dept: INTERNAL MEDICINE CLINIC | Age: 73
End: 2018-03-15

## 2018-03-15 RX ORDER — MOMETASONE FUROATE AND FORMOTEROL FUMARATE DIHYDRATE 100; 5 UG/1; UG/1
AEROSOL RESPIRATORY (INHALATION)
Qty: 1 INHALER | Refills: 3 | OUTPATIENT
Start: 2018-03-15

## 2018-03-15 NOTE — TELEPHONE ENCOUNTER
Shantel/Dr. Jaclyn Moya [phone]     Says she'll send the medication you called on to [facility] and if you need anything else to call her back

## 2018-03-15 NOTE — TELEPHONE ENCOUNTER
Shantel with Dr Davina Hadley office called back to inform they will fill [facility] for patient.

## 2018-03-15 NOTE — TELEPHONE ENCOUNTER
Left message with Dr Sherri Sharif office regarding Minnie Quintana request to be sent to [facility] per request. Patient has cancelled 2 appt's since last seen in November 2017. Her next appt is April 2018.

## 2018-03-19 ENCOUNTER — OFFICE VISIT (OUTPATIENT)
Dept: NEUROLOGY | Age: 73
End: 2018-03-19

## 2018-03-19 VITALS
HEIGHT: 61 IN | WEIGHT: 157 LBS | OXYGEN SATURATION: 94 % | DIASTOLIC BLOOD PRESSURE: 84 MMHG | HEART RATE: 81 BPM | BODY MASS INDEX: 29.64 KG/M2 | SYSTOLIC BLOOD PRESSURE: 140 MMHG | RESPIRATION RATE: 16 BRPM

## 2018-03-19 DIAGNOSIS — G40.209 CRYPTOGENIC PARTIAL COMPLEX EPILEPSY (HCC): Primary | ICD-10-CM

## 2018-03-19 DIAGNOSIS — G93.0 CYST OF BRAIN: ICD-10-CM

## 2018-03-19 DIAGNOSIS — G25.0 ESSENTIAL TREMOR: ICD-10-CM

## 2018-03-19 NOTE — MR AVS SNAPSHOT
[facility] [address] 
[phone] Patient: Augustin Mullins MRN: [id number] :1945 Visit Information Date & Time Provider Department Dept. Phone Encounter #  
 3/19/2018 11:40 AM Nikki Avalos MD [facility] at [address] [id number] Follow-up Instructions Return in about 6 months (around 2018). Your Appointments 2018 10:15 AM  
ROUTINE CARE with Deya Lindo MD  
[facility]) Appt Note: 4mo f/u -- pt needed to come a little early for work reasons [address] [place] [address]  
[facility] Upcoming Health Maintenance Date Due FOBT Q 1 YEAR AGE 50-75 1996 BREAST CANCER SCRN MAMMOGRAM 3/31/2018 MEDICARE YEARLY EXAM 10/11/2018 GLAUCOMA SCREENING Q2Y 2019 DTaP/Tdap/Td series (2 - Td) 2024 Allergies as of 3/19/2018  Review Complete On: 3/19/2018 By: Nikki Avalos MD  
  
 Severity Noted Reaction Type Reactions Prednisone  2016    Other (comments) Shakiness and headache Current Immunizations  Never Reviewed Name Date Tdap 2014 Not reviewed this visit You Were Diagnosed With   
  
 Codes Comments [facility]    -  Primary ICD-10-CM: V81.769 ICD-9-CM: 345.50 Essential tremor     ICD-10-CM: G25.0 ICD-9-CM: 333.1 Cyst of brain     ICD-10-CM: G93.0 ICD-9-CM: 798. 0 Vitals BP Pulse Resp Height(growth percentile) Weight(growth percentile) SpO2  
 140/84 81 16 5' 1\" (1.549 m) 157 lb (71.2 kg) 94% BMI OB Status Smoking Status 29.66 kg/m2 Postmenopausal Current Every Day Smoker Vitals History BMI and BSA Data  Body Mass Index Body Surface Area  
 29.66 kg/m 2 1.75 m 2  
 Preferred Pharmacy Pharmacy Name Phone [facility], [place] - [address] [phone] Your Updated Medication List  
  
   
This list is accurate as of 3/19/18 11:56 AM.  Always use your most recent med list.  
  
  
  
  
 albuterol 90 mcg/actuation inhaler Commonly known as:  PROAIR HFA Take 1 Puff by inhalation every four (4) hours as needed for Wheezing or Shortness of Breath. dilTIAZem  mg ER capsule Commonly known as:  CARTIA XT  
TAKE 1 CAPSULE EVERY DAY  
  
 ibuprofen 800 mg tablet Commonly known as:  MOTRIN Take 1 Tab by mouth every eight (8) hours as needed for Pain.  
  
 lamoTRIgine 150 mg tablet Commonly known as: LaMICtal  
  
 meclizine 25 mg tablet Commonly known as:  ANTIVERT Take 1 Tab by mouth three (3) times daily as needed. simvastatin 20 mg tablet Commonly known as:  ZOCOR  
TAKE 1 TABLET EVERY NIGHT  
  
 * SYMBICORT 160-4.5 mcg/actuation Hfaa Generic drug:  budesonide-formoterol Take 2 Puffs by inhalation two (2) times a day. * budesonide-formoterol 80-4.5 mcg/actuation Hfaa Commonly known as:  SYMBICORT Take 2 Puffs by inhalation two (2) times a day. * Notice: This list has 2 medication(s) that are the same as other medications prescribed for you. Read the directions carefully, and ask your doctor or other care provider to review them with you. Follow-up Instructions Return in about 6 months (around 9/19/2018). Patient Instructions A Healthy Lifestyle: Care Instructions Your Care Instructions A healthy lifestyle can help you feel good, stay at a healthy weight, and have plenty of energy for both work and play. A healthy lifestyle is something you can share with your whole family. A healthy lifestyle also can lower your risk for serious health problems, such as high blood pressure, heart disease, and diabetes. You can follow a few steps listed below to improve your health and the health of your family. Follow-up care is a key part of your treatment and safety. Be sure to make and go to all appointments, and call your doctor if you are having problems. It's also a good idea to know your test results and keep a list of the medicines you take. How can you care for yourself at home? · Do not eat too much sugar, fat, or fast foods. You can still have dessert and treats now and then. The goal is moderation. · Start small to improve your eating habits. Pay attention to portion sizes, drink less juice and soda pop, and eat more fruits and vegetables. ¨ Eat a healthy amount of food. A 3-ounce serving of meat, for example, is about the size of a deck of cards. Fill the rest of your plate with vegetables and whole grains. ¨ Limit the amount of soda and sports drinks you have every day. Drink more water when you are thirsty. ¨ Eat at least 5 servings of fruits and vegetables every day. It may seem like a lot, but it is not hard to reach this goal. A serving or helping is 1 piece of fruit, 1 cup of vegetables, or 2 cups of leafy, raw vegetables. Have an apple or some carrot sticks as an afternoon snack instead of a candy bar. Try to have fruits and/or vegetables at every meal. 
· Make exercise part of your daily routine. You may want to start with simple activities, such as walking, bicycling, or slow swimming. Try to be active 30 to 60 minutes every day. You do not need to do all 30 to 60 minutes all at once. For example, you can exercise 3 times a day for 10 or 20 minutes. Moderate exercise is safe for most people, but it is always a good idea to talk to your doctor before starting an exercise program. 
· Keep moving. Evalee Currie the lawn, work in the garden, or [employer]. Take the stairs instead of the elevator at work. · If you smoke, quit.  People who smoke have an increased risk for heart attack, stroke, cancer, and other lung illnesses. Quitting is hard, but there are ways to boost your chance of quitting tobacco for good. ¨ Use nicotine gum, patches, or lozenges. ¨ Ask your doctor about stop-smoking programs and medicines. ¨ Keep trying. In addition to reducing your risk of diseases in the future, you will notice some benefits soon after you stop using tobacco. If you have shortness of breath or asthma symptoms, they will likely get better within a few weeks after you quit. · Limit how much alcohol you drink. Moderate amounts of alcohol (up to 2 drinks a day for men, 1 drink a day for women) are okay. But drinking too much can lead to liver problems, high blood pressure, and other health problems. Family health If you have a family, there are many things you can do together to improve your health. · Eat meals together as a family as often as possible. · Eat healthy foods. This includes fruits, vegetables, lean meats and dairy, and whole grains. · Include your family in your fitness plan. Most people think of activities such as jogging or tennis as the way to fitness, but there are many ways you and your family can be more active. Anything that makes you breathe hard and gets your heart pumping is exercise. Here are some tips: 
¨ Walk to do errands or to take your child to school or the bus. ¨ Go for a family bike ride after dinner instead of watching TV. Where can you learn more? Go to http://bennett-sangita.info/. Enter R241 in the search box to learn more about \"A Healthy Lifestyle: Care Instructions. \" Current as of: May 12, 2017 Content Version: 11.4 © 8805-3613 [employer]. Care instructions adapted under license by [employer] (which disclaims liability or warranty for this information).  If you have questions about a medical condition or this instruction, always ask your healthcare professional. [facility] disclaims any warranty or liability for your use of this information. Introducing \[facility]\"" & HEALTH SERVICES! Shilpa Mejia introduces [employer] patient portal. Now you can access parts of your medical record, email your doctor's office, and request medication refills online. 1. In your internet browser, go to [email or web address] 2. Click on the First Time User? Click Here link in the Sign In box. You will see the New Member Sign Up page. 3. Enter your [employer] Access Code exactly as it appears below. You will not need to use this code after youve completed the sign-up process. If you do not sign up before the expiration date, you must request a new code. · [employer] Access Code: [email or web address] Expires: 4/10/2018 10:50 AM 
 
4. Enter the last four digits of your Social Security Number (xxxx) and Date of Birth (mm/dd/yyyy) as indicated and click Submit. You will be taken to the next sign-up page. 5. Create a Inn[employer]ne ID. This will be your [employer] login ID and cannot be changed, so think of one that is secure and easy to remember. 6. Create a Inn[employer] password. You can change your password at any time. 7. Enter your Password Reset Question and Answer. This can be used at a later time if you forget your password. 8. Enter your e-mail address. You will receive e-mail notification when new information is available in [address] 9. Click Sign Up. You can now view and download portions of your medical record. 10. Click the Download Summary menu link to download a portable copy of your medical information. If you have questions, please visit the Frequently Asked Questions section of the [employer] website. Remember, [employer] is NOT to be used for urgent needs. For medical emergencies, dial 911. Now available from your iPhone and Android! Please provide this summary of care documentation to your next provider. Your primary care clinician is listed as Amy Kmiball. If you have any questions after today's visit, please call [phone].

## 2018-03-19 NOTE — PATIENT INSTRUCTIONS

## 2018-03-19 NOTE — PROGRESS NOTES
Chief Complaint   Patient presents with    Epilepsy         HISTORY OF PRESENT ILLNESS  Minus Million came back for follow up. She has been doing fine. No real changes in her overall health since last seen. No further seizure-like spells. She had an episode of BPPV in December for which she went to the emergency department. Still gets dizzy sometimes but it is not bothersome . 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 Rarely she will get a brief pain in the back of her head. She takes lamotrigine 150 mg twice a day    She had an EEG for evaluation of spells where she had alteration of awareness and could not respond to people. EEG showed epileptiform activity intermittently out of the left temporal head region. She was also having occasional weakness in the right upper and lower extremity. She has never had any witnessed generalized convulsion. 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 This was stable on her last scan compared to the one 2 years prior. She has been noticing tremor in her hands mainly on the right side especially when she tries to write. No resting tremor. No difficulties with mobility or ambulation. No falls. Past Medical History:   Diagnosis Date    Chronic obstructive pulmonary disease (HCC)     Hyperlipidemia     Seizures (HCC)      Current Outpatient Prescriptions   Medication Sig    dilTIAZem CD (CARTIA XT) 120 mg ER capsule TAKE 1 CAPSULE EVERY DAY    lamoTRIgine (LAMICTAL) 150 mg tablet     meclizine (ANTIVERT) 25 mg tablet Take 1 Tab by mouth three (3) times daily as needed.  simvastatin (ZOCOR) 20 mg tablet TAKE 1 TABLET EVERY NIGHT    budesonide-formoterol (SYMBICORT) 80-4.5 mcg/actuation HFAA Take 2 Puffs by inhalation two (2) times a day.  ibuprofen (MOTRIN) 800 mg tablet Take 1 Tab by mouth every eight (8) hours as needed for Pain.     budesonide-formoterol (SYMBICORT) 160-4.5 mcg/actuation HFA inhaler Take 2 Puffs by inhalation two (2) times a day.  albuterol (PROAIR HFA) 90 mcg/actuation inhaler Take 1 Puff by inhalation every four (4) hours as needed for Wheezing or Shortness of Breath. No current facility-administered medications for this visit. PHYSICAL EXAMINATION:    Visit Vitals    /84    Pulse 81    Resp 16    Ht 5' 1\" (1.549 m)    Wt 71.2 kg (157 lb)    SpO2 94%    BMI 29.66 kg/m2       NEUROLOGICAL EXAMINATION:     Mental Status:   Alert and oriented to person, place, and time with recent and remote memory intact. Attention span and concentration are normal. Speech is fluent with a full fund of knowledge. Cranial Nerves:    II, III, IV, VI:  Visual acuity grossly intact. Visual fields are normal.    Pupils are equal, round, and reactive to light and accommodation. Extra-ocular movements are full and fluid. Fundoscopic exam was benign, no ptosis or nystagmus. V-XII: Hearing is grossly intact. Facial features are symmetric, with normal sensation and strength. The palate rises symmetrically and the tongue protrudes midline. Sternocleidomastoids 5/5. Motor Examination: Slight weakness of the  strength on the right and slight weakness of the great toe extension on the right. Normal tone, bulk, and strength on the left side. No cogwheel rigidity or clonus present. Sensory exam:  Normal throughout to pinprick, temperature, and vibration sense. Normal proprioception. Coordination:  Finger to nose and rapid arm movement testing was normal.  Fine, 9-10 Hz n postural tremor was noted in the fingers of the right hand when arms were outstretched. No resting tremor    Gait and Station:  Steady. Normal arm swing. No Rhomberg or pronator drift. No muscle wasting or fasiculations noted. Reflexes:  DTRs 2+ throughout. Toes downgoing.         LABS / IMAGING  Lab Results   Component Value Date/Time    Sodium 141 02/01/2018 04:03 PM    Potassium 3.6 02/01/2018 04:03 PM    Chloride 106 02/01/2018 04:03 PM    CO2 25 02/01/2018 04:03 PM    Anion gap 10 02/01/2018 04:03 PM    Glucose 93 02/01/2018 04:03 PM    BUN 12 02/01/2018 04:03 PM    Creatinine 0.85 02/01/2018 04:03 PM    BUN/Creatinine ratio 14 02/01/2018 04:03 PM    GFR est AA >60 02/01/2018 04:03 PM    GFR est non-AA >60 02/01/2018 04:03 PM    Calcium 8.8 02/01/2018 04:03 PM    Bilirubin, total 0.3 02/01/2018 04:03 PM    AST (SGOT) 21 02/01/2018 04:03 PM    Alk. phosphatase 89 02/01/2018 04:03 PM    Protein, total 7.2 02/01/2018 04:03 PM    Albumin 3.6 02/01/2018 04:03 PM    Globulin 3.6 02/01/2018 04:03 PM    A-G Ratio 1.0 (L) 02/01/2018 04:03 PM    ALT (SGPT) 30 02/01/2018 04:03 PM     Lab Results   Component Value Date/Time    WBC 8.0 02/01/2018 04:03 PM    HGB 13.7 02/01/2018 04:03 PM    HCT 40.3 02/01/2018 04:03 PM    PLATELET 217 33/69/2393 04:03 PM    MCV 91.2 02/01/2018 04:03 PM     Last Lamotrigine level was 6.3    ASSESSMENT    ICD-10-CM ICD-9-CM    1. Cryptogenic partial complex epilepsy ([address] G40.219 345.50    2. Essential tremor G25.0 333.1    3. 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 Augustin Mullins has had episodes where she could not respond to move. Complex partial seizures are suspected given abnormal EEG. She is doing well from seizure standpoint and should continue lamotrigine 150 mg twice a day. Some of these episodes in the past were related to stress and there has been a concern that these may be nonepileptic. We may consider EMU in the future. 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 Scans have been stable over the past 2-3 years. She has benign essential tremor involving her arms/hands. It sometimes affects her head as well. This does not seem to be bothering her and wishes to defer any other pharmacologic therapy.       Continue periodic follow-up    Miriam Sheets MD  Diplomate, American Board of Psychiatry & Neurology (Neurology)  [place] Board of Psychiatry & Neurology (Clinical Neurophysiology)  Diplomate, American Board of Electrodiagnostic Medicine

## 2018-03-22 DIAGNOSIS — R05.9 COUGH: ICD-10-CM

## 2018-03-22 RX ORDER — ALBUTEROL SULFATE 90 UG/1
AEROSOL, METERED RESPIRATORY (INHALATION)
Qty: 1 INHALER | Refills: 3 | Status: SHIPPED | OUTPATIENT
Start: 2018-03-22 | End: 2019-11-07 | Stop reason: SDUPTHER

## 2018-05-01 ENCOUNTER — TELEPHONE (OUTPATIENT)
Dept: NEUROLOGY | Age: 73
End: 2018-05-01

## 2018-05-01 NOTE — TELEPHONE ENCOUNTER
Pt calling re a sharp pain in her head and blurry vision. Pt stated she went to the eye doctor and he told her to f/u with Dr. Tyra Conroy.  Pt is scheduled for 5/2 with NP.

## 2018-05-02 ENCOUNTER — OFFICE VISIT (OUTPATIENT)
Dept: NEUROLOGY | Age: 73
End: 2018-05-02

## 2018-05-02 VITALS
RESPIRATION RATE: 18 BRPM | WEIGHT: 159 LBS | BODY MASS INDEX: 30.02 KG/M2 | DIASTOLIC BLOOD PRESSURE: 72 MMHG | SYSTOLIC BLOOD PRESSURE: 124 MMHG | HEART RATE: 71 BPM | OXYGEN SATURATION: 96 % | HEIGHT: 61 IN

## 2018-05-02 DIAGNOSIS — I10 ESSENTIAL HYPERTENSION: ICD-10-CM

## 2018-05-02 DIAGNOSIS — H53.8 BLURRED VISION, BILATERAL: Primary | ICD-10-CM

## 2018-05-02 DIAGNOSIS — G40.209 CRYPTOGENIC PARTIAL COMPLEX EPILEPSY (HCC): ICD-10-CM

## 2018-05-02 DIAGNOSIS — G93.0 CYST OF BRAIN: ICD-10-CM

## 2018-05-02 DIAGNOSIS — G25.0 ESSENTIAL TREMOR: ICD-10-CM

## 2018-05-02 NOTE — PROGRESS NOTES
Patient here for evaluation of head pain and blurred vision in both eyes. Symptoms started on 4/30/18. She reported she had a headache that day as well. No seizures since last office visit.

## 2018-05-02 NOTE — PROGRESS NOTES
Date:            May 2, 2018    Name:  Robbi Silverio  :  1945  MRN:  [id number]     PCP:  Pan Mcnamara MD    Chief Complaint   Patient presents with    Head Pain    Blurred Vision     in both eyes         HISTORY OF PRESENT ILLNESS:  Raúl Mcneil is a 71 y.o., female who presents today for follow up for seizures, which are stable, and left occipital neuralgia, which has resolved. She was last seen for that in March, has remained seizure-free on Lamictal.  There was discussion of EMU admission for this, which she is not interested in. She is here today with a new complaint of head pain and vision change. On Friday, she was at her computer and felt a burning on the right side of her scalp. This lasted a few seconds and then stopped, but happened 3-4 more times that day. On Monday she had a sharp pain on the right side of her head again and then her eyes got blurry. This has improved somewhat, but vision in both eyes is blurred, closing one or the other does not help. She had an eye exam yesterday and was told that the problem is above her eyes. She has a hard time reading close up, also cannot read road signs in the distance. A little diplopia, yesterday she saw two traffic lights when she attempted to drive. She feels a throbbing pain behind her right eye at times. Pain that she has now on the right is different than that with her occipital neuralgia. She does have a h/o blood clot in her leg 40 years ago, is not sure if she has any familial hypercoagulopathy because that. All three of her brothers have pacemakers, defibrillators or stents. Her father had an MI at 62. She is not on aspirin, reports that she has been told not to take it because of mitral regurgitation. She does smoke, has cut down. Has COPD. Blood pressure well controlled, LDL 85 on statin. No diabetes.     MRI Results (most recent):    Results from [place] encounter on 16   MRI BRAIN W WO CONT   Narrative Final Report      ICD Codes / Adm. Diagnosis: 348.0  G93.0 / Cerebral cysts  Cerebral cysts  Examination:  MR BRAIN W AND WO CON  - [id number] - Jun 8 2016  3:29PM  Accession No:  [id number]  Reason:  Brain cyst follow up      REPORT:  EXAM:  MR BRAIN W AND WO CON  INDICATION:  Brain cyst follow up, G 93.0  TECHNIQUE: Sagittal T1, axial FLAIR, T2, T1 and gradient echo T2-weighted   images of the head were obtained followed by intravenous infusion 7.5 mL   Gadavist repeat axial and coronal T1-weighted images and axial diffusion   weighted images. COMPARISON: MRI of the head 8/6/15  FINDINGS:  There has been no interval change in the right anterior temporal lobe cyst   measuring approximately 10 x 7 mm on axial T2-weighted images. Again note is   made of normal CSF signal within the cyst without significant clearing of   cysts or other findings to suggest malignancy, activity or other evidence of   an active process. The ventricular size and configuration are normal.   Normal signal demonstrated in the cerebral hemispheres, brain stem and   cerebellum. No abnormal areas of intracranial enhancement. No abnormal diffusion. No evidence of intracranial hemorrhage, infarct, mass or abnormal   extra-axial fluid collections. Normal flow-voids are present in the vertebral, basilar and carotid artery   systems. The craniocervical junction is normal.   The structures of the cranial base including paranasal sinuses are    unremarkable. IMPRESSION:   1. No change in the benign-appearing right anterior temporal lobe cyst since   at least 8/15/14. 2. Normal MRI of the head. Kyleigh Barron           Signing/Reading Doctor: Kristen Gan ([id number])    William Ramesh ([id number])  Jun 8 2016  8:25PM                                       3.19.2018 dipak Sams came back for follow up. She has been doing fine. No real changes in her overall health since last seen.   No further seizure-like spells.     She had an episode of BPPV in December for which she went to the emergency department. Still gets dizzy sometimes but it is not bothersome .      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 Rarely she will get a brief pain in the back of her head.       She takes lamotrigine 150 mg twice a day     She had an EEG for evaluation of spells where she had alteration of awareness and could not respond to people. EEG showed epileptiform activity intermittently out of the left temporal head region. She was also having occasional weakness in the right upper and lower extremity. She has never had any witnessed generalized convulsion.      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 This was stable on her last scan compared to the one 2 years prior.      She has been noticing tremor in her hands mainly on the right side especially when she tries to write. No resting tremor. No difficulties with mobility or ambulation. No falls. Current Outpatient Prescriptions   Medication Sig    VENTOLIN HFA 90 mcg/actuation inhaler INHALE 1 PUFF EVERY FOUR HOURS AS NEEDED FOR WHEEZING OR SHORTNESS OF BREATH.  dilTIAZem CD (CARTIA XT) 120 mg ER capsule TAKE 1 CAPSULE EVERY DAY    lamoTRIgine (LAMICTAL) 150 mg tablet     meclizine (ANTIVERT) 25 mg tablet Take 1 Tab by mouth three (3) times daily as needed.  simvastatin (ZOCOR) 20 mg tablet TAKE 1 TABLET EVERY NIGHT    budesonide-formoterol (SYMBICORT) 80-4.5 mcg/actuation HFAA Take 2 Puffs by inhalation two (2) times a day.  ibuprofen (MOTRIN) 800 mg tablet Take 1 Tab by mouth every eight (8) hours as needed for Pain.  budesonide-formoterol (SYMBICORT) 160-4.5 mcg/actuation HFA inhaler Take 2 Puffs by inhalation two (2) times a day. No current facility-administered medications for this visit.       Allergies   Allergen Reactions    Prednisone Other (comments)     Shakiness and headache     Past Medical History: Diagnosis Date    Chronic obstructive pulmonary disease (HCC)     Hyperlipidemia     Seizures (HCC)      Past Surgical History:   Procedure Laterality Date    HX BREAST LUMPECTOMY      HX  SECTION      HX ORTHOPAEDIC      trigger finger    HX WISDOM TEETH EXTRACTION       Social History     Social History    Marital status:      Spouse name: N/A    Number of children: N/A    Years of education: N/A     Occupational History    Not on file. Social History Main Topics    Smoking status: Current Every Day Smoker     Packs/day: 0.50     Years: 50.00     Types: Cigarettes    Smokeless tobacco: Never Used    Alcohol use No    Drug use: No    Sexual activity: No     Other Topics Concern    Not on file     Social History Narrative     Family History   Problem Relation Age of Onset    Heart Disease Father     Stroke Father     Cancer Paternal Aunt      breast    Cancer Maternal Grandmother     Cancer Paternal Grandmother      colon    Cancer Son      colon    Cancer Paternal Aunt      breast         PHYSICAL EXAMINATION:    Visit Vitals    /72    Pulse 71    Resp 18    Ht 5' 1\" (1.549 m)    Wt 72.1 kg (159 lb)    SpO2 96%    BMI 30.04 kg/m2     General:  Well defined, nourished, and groomed individual in no acute distress. Neck: Supple, nontender, no bruits, no pain with resistance to active range of motion. Heart: Regular rate and rhythm, no murmurs, rub, or gallop. Normal S1S2. Lungs:  Clear to auscultation bilaterally with equal chest expansion, no cough, no wheeze  Musculoskeletal:  Extremities revealed no edema and had full range of motion of joints. Psych:  Good mood and bright affect    NEUROLOGICAL EXAMINATION:     Mental Status:   Alert and oriented to person, place, and time with recent and remote memory intact. Attention span and concentration are normal. Speech is fluent with a full fund of knowledge.       Cranial Nerves:    II, III, IV, VI: Bilateral peripheral visual field deficit, worse on the left. Bilateral diplopia at horizontal extremes  Pupils are equal, round, and reactive to light. Extra-ocular movements are full and fluid. No ptosis or nystagmus. V-XII: Hearing is grossly intact. Facial features are symmetric, with normal sensation and strength. The palate rises symmetrically and the tongue protrudes midline. Sternocleidomastoids 5/5. Motor Examination: Normal tone, bulk, and strength, 5/5 muscle strength throughout except for mild left hip flexor weakness, which patient reports is not new and is unchanged  Coordination:  Finger to nose testing was normal.   No resting tremor, mild bilateral hand intention tremor  Gait and Station:  Steady while walking. Normal arm swing. No pronator drift. No muscle wasting or fasciculations noted. ASSESSMENT AND PLAN    ICD-10-CM ICD-9-CM    1. Blurred vision, bilateral H53.8 368.8 MRI BRAIN W WO CONT   2. Cryptogenic partial complex epilepsy ([facility] [address] G40.219 345.50    3. Cyst of brain G93.0 348.0    4. Essential tremor G25.0 333.1    5. Essential hypertension I10 36.9      71year-old he was seen in follow-up for seizures, which are stable on Lamictal.  Also has a history of incidental finding of right temporal lobe cyst, stable on last image 2016, and resolved left occipital neuralgia. Has a new complaint of blurred vision bilaterally, onset associated with throbbing right temporal pain. This is improved somewhat, but vision exam was normal which raises concern for central origin of vision loss. Exam unremarkable aside from peripheral vision deficit and diplopia. 1.  Needs MRI to rule out stroke, neoplasm, any other structural cause of her symptoms  2. Discussed importance of controlling blood sugar, blood pressure, cholesterol for stroke prevention. Needs to quit smoking as well, as it is likely her biggest stroke risk  3.   Patient refused aspirin, reports being told in the past that she should not take this. Will discuss with her PCP if there is any reason that she cannot  4. Further workup will depend on MRI results, may need more stroke workup  5. Patient is advised not to drive due to vision deficit  6. Continue Lamictal for seizures  7. No treatment for essential tremor    Follow-up in 1 month, will call sooner if additional testing needs to be done      Orestes Connor NP    This note was created using voice recognition software. Despite editing, there may be syntax errors.

## 2018-05-02 NOTE — Clinical Note
Dr. Trixie Anthony, Ms. Oden was seen today for sudden onset visual deficit. I have ordered MRI, and recommended that she start aspirin 81 mg daily until stroke is ruled out. She refused, and reports being told not to take aspirin in the past.  I cannot find anything in her record that would preclude use of aspirin. Are you aware of anything?     Thanks, [employer]

## 2018-05-02 NOTE — PATIENT INSTRUCTIONS
I will check with your PCP and find out if there is a reason you cannot take aspirin, we will call you if we want you to start that medication          [facility]   Statement to Patients  April 1, 2014      In an effort to ensure the large volume of patient prescription refills is processed in the most efficient and expeditious manner, we are asking our patients to assist us by calling your Pharmacy for all prescription refills, this will include also your  Mail Order Pharmacy. The pharmacy will contact our office electronically to continue the refill process. Please do not wait until the last minute to call your pharmacy. We need at least 48 hours (2days) to fill prescriptions. We also encourage you to call your pharmacy before going to  your prescription to make sure it is ready. With regard to controlled substance prescription refill requests (narcotic refills) that need to be picked up at our office, we ask your cooperation by providing us with at least 72 hours (3days) notice that you will need a refill. We will not refill narcotic prescription refill requests after 4:00pm on any weekday, Monday through Thursday, or after 2:00pm on Fridays, or on the weekends. We encourage everyone to explore another way of getting your prescription refill request processed using [employer], our patient web portal through our electronic medical record system. F[employer]les is an efficient and effective way to communicate your medication request directly to the office and  downloadable as an rene on your smart phone . [employer] also features a review functionality that allows you to view your medication list as well as leave messages for your physician. Are you ready to get connected? If so please review the attatched instructions or speak to any of our staff to get you set up right away! Thank you so much for your cooperation.  Should you have any questions please contact our Practice Administrator. The Physicians and Staff,  [facility]                A Healthy Lifestyle: Care Instructions  Your Care Instructions    A healthy lifestyle can help you feel good, stay at a healthy weight, and have plenty of energy for both work and play. A healthy lifestyle is something you can share with your whole family. A healthy lifestyle also can lower your risk for serious health problems, such as high blood pressure, heart disease, and diabetes. You can follow a few steps listed below to improve your health and the health of your family. Follow-up care is a key part of your treatment and safety. Be sure to make and go to all appointments, and call your doctor if you are having problems. It's also a good idea to know your test results and keep a list of the medicines you take. How can you care for yourself at home? · Do not eat too much sugar, fat, or fast foods. You can still have dessert and treats now and then. The goal is moderation. · Start small to improve your eating habits. Pay attention to portion sizes, drink less juice and soda pop, and eat more fruits and vegetables. ¨ Eat a healthy amount of food. A 3-ounce serving of meat, for example, is about the size of a deck of cards. Fill the rest of your plate with vegetables and whole grains. ¨ Limit the amount of soda and sports drinks you have every day. Drink more water when you are thirsty. ¨ Eat at least 5 servings of fruits and vegetables every day. It may seem like a lot, but it is not hard to reach this goal. A serving or helping is 1 piece of fruit, 1 cup of vegetables, or 2 cups of leafy, raw vegetables. Have an apple or some carrot sticks as an afternoon snack instead of a candy bar. Try to have fruits and/or vegetables at every meal.  · Make exercise part of your daily routine. You may want to start with simple activities, such as walking, bicycling, or slow swimming.  Try to be active 30 to 60 minutes every day. You do not need to do all 30 to 60 minutes all at once. For example, you can exercise 3 times a day for 10 or 20 minutes. Moderate exercise is safe for most people, but it is always a good idea to talk to your doctor before starting an exercise program.  · Keep moving. Nimesh Cue the lawn, work in the garden, or [employer]. Take the stairs instead of the elevator at work. · If you smoke, quit. People who smoke have an increased risk for heart attack, stroke, cancer, and other lung illnesses. Quitting is hard, but there are ways to boost your chance of quitting tobacco for good. ¨ Use nicotine gum, patches, or lozenges. ¨ Ask your doctor about stop-smoking programs and medicines. ¨ Keep trying. In addition to reducing your risk of diseases in the future, you will notice some benefits soon after you stop using tobacco. If you have shortness of breath or asthma symptoms, they will likely get better within a few weeks after you quit. · Limit how much alcohol you drink. Moderate amounts of alcohol (up to 2 drinks a day for men, 1 drink a day for women) are okay. But drinking too much can lead to liver problems, high blood pressure, and other health problems. Family health  If you have a family, there are many things you can do together to improve your health. · Eat meals together as a family as often as possible. · Eat healthy foods. This includes fruits, vegetables, lean meats and dairy, and whole grains. · Include your family in your fitness plan. Most people think of activities such as jogging or tennis as the way to fitness, but there are many ways you and your family can be more active. Anything that makes you breathe hard and gets your heart pumping is exercise. Here are some tips:  ¨ Walk to do errands or to take your child to school or the bus. ¨ Go for a family bike ride after dinner instead of watching TV. Where can you learn more?   Go to http://bennett-sangita.info/. Enter Z560 in the search box to learn more about \"A Healthy Lifestyle: Care Instructions. \"  Current as of: May 12, 2017  Content Version: 11.4  © 0554-3990 [employer]. Care instructions adapted under license by [employer] (which disclaims liability or warranty for this information). If you have questions about a medical condition or this instruction, always ask your healthcare professional. [address] any warranty or liability for your use of this information.

## 2018-05-02 NOTE — MR AVS SNAPSHOT
[facility] 593 [address] [place] [address] 
[phone] Patient: Catalina King MRN: [id number] :1945 Visit Information Date & Time Provider Department Dept. Phone Encounter #  
 2018 11:00 AM Last Argueta NP [facility] at [address] [facility] [id number] Your Appointments 2018 10:15 AM  
ROUTINE CARE with Amadeo Deleon MD  
[facility] Internal Medicine [facility]) Appt Note: 4mo f/u -- pt needed to come a little early for work reasons [address] [facility] 7 [address]  
[address] [facility] [place]  
  
    
 2018 11:00 AM  
Follow Up with Last Argueta NP [facility] at [address] [facility]) Appt Note: 4 wk f/u NN 18 [address] [place] [address]  
[phone]  
  
   
 [address] [place] [address]  
  
    
 2018  1:00 PM  
Follow Up with Elisha Rodriguez MD  
[facility] at [address] [place] [address]) Appt Note: 6 month follow up KRU 3/19  
 [address] [place] [address]  
[phone]  
  
   
 [address] [facility] 14 [facility] [address] Upcoming Health Maintenance Date Due FOBT Q 1 YEAR AGE 50-75 1996 BREAST CANCER SCRN MAMMOGRAM 3/31/2018 Influenza Age 5 to Adult 2018 MEDICARE YEARLY EXAM 10/11/2018 GLAUCOMA SCREENING Q2Y 2019 DTaP/Tdap/Td series (2 - Td) 2024 Allergies as of 2018  Review Complete On: 2018 By: Connor Anna LPN Severity Noted Reaction Type Reactions Prednisone  2016    Other (comments) Shakiness and headache Current Immunizations  Never Reviewed Name Date Tdap 2014 Not reviewed this visit You Were Diagnosed With   
  
 Codes Comments Blurred vision, bilateral    -  Primary ICD-10-CM: H53.8 ICD-9-CM: 368.8 Cryptogenic partial complex epilepsy ([facility]     ICD-10-CM: S73.502 ICD-9-CM: 345.50 Vitals BP Pulse Resp Height(growth percentile) Weight(growth percentile) SpO2  
 124/72 71 18 5' 1\" (1.549 m) 159 lb (72.1 kg) 96% BMI OB Status Smoking Status 30.04 kg/m2 Postmenopausal Current Every Day Smoker Vitals History BMI and BSA Data Body Mass Index Body Surface Area 30.04 kg/m 2 1.76 m 2 Preferred Pharmacy Pharmacy Name Phone [id number] [place], [address] [phone] Your Updated Medication List  
  
   
This list is accurate as of 5/2/18 11:51 AM.  Always use your most recent med list.  
  
  
  
  
 dilTIAZem  mg ER capsule Commonly known as:  CARTIA XT  
TAKE 1 CAPSULE EVERY DAY  
  
 ibuprofen 800 mg tablet Commonly known as:  MOTRIN Take 1 Tab by mouth every eight (8) hours as needed for Pain.  
  
 lamoTRIgine 150 mg tablet Commonly known as: LaMICtal  
  
 meclizine 25 mg tablet Commonly known as:  ANTIVERT Take 1 Tab by mouth three (3) times daily as needed. simvastatin 20 mg tablet Commonly known as:  ZOCOR  
TAKE 1 TABLET EVERY NIGHT  
  
 * SYMBICORT 160-4.5 mcg/actuation Hfaa Generic drug:  budesonide-formoterol Take 2 Puffs by inhalation two (2) times a day. * budesonide-formoterol 80-4.5 mcg/actuation Hfaa Commonly known as:  SYMBICORT Take 2 Puffs by inhalation two (2) times a day. VENTOLIN HFA 90 mcg/actuation inhaler Generic drug:  albuterol INHALE 1 PUFF EVERY FOUR HOURS AS NEEDED FOR WHEEZING OR SHORTNESS OF BREATH. * Notice: This list has 2 medication(s) that are the same as other medications prescribed for you. Read the directions carefully, and ask your doctor or other care provider to review them with you. To-Do List   
 05/03/2018 Imaging:  MRI BRAIN W WO CONT Patient Instructions I will check with your PCP and find out if there is a reason you cannot take aspirin, we will call you if we want you to start that medication PRESCRIPTION REFILL POLICY [facility] Statement to Patients April 1, 2014 In an effort to ensure the large volume of patient prescription refills is processed in the most efficient and expeditious manner, we are asking our patients to assist us by calling your Pharmacy for all prescription refills, this will include also your  Mail Order Pharmacy. The pharmacy will contact our office electronically to continue the refill process. Please do not wait until the last minute to call your pharmacy. We need at least 48 hours (2days) to fill prescriptions. We also encourage you to call your pharmacy before going to  your prescription to make sure it is ready. With regard to controlled substance prescription refill requests (narcotic refills) that need to be picked up at our office, we ask your cooperation by providing us with at least 72 hours (3days) notice that you will need a refill. We will not refill narcotic prescription refill requests after 4:00pm on any weekday, Monday through Thursday, or after 2:00pm on Fridays, or on the weekends. We encourage everyone to explore another way of getting your prescription refill request processed using VayaFeliz, our patient web portal through our electronic medical record system. VayaFeliz is an efficient and effective way to communicate your medication request directly to the office and  downloadable as an rene on your smart phone . VayaFeliz also features a review functionality that allows you to view your medication list as well as leave messages for your physician. Are you ready to get connected? If so please review the attatched instructions or speak to any of our staff to get you set up right away! Thank you so much for your cooperation. Should you have any questions please contact our Practice Administrator. The Physicians and Staff,  [facility] A Healthy Lifestyle: Care Instructions Your Care Instructions A healthy lifestyle can help you feel good, stay at a healthy weight, and have plenty of energy for both work and play. A healthy lifestyle is something you can share with your whole family. A healthy lifestyle also can lower your risk for serious health problems, such as high blood pressure, heart disease, and diabetes. You can follow a few steps listed below to improve your health and the health of your family. Follow-up care is a key part of your treatment and safety. Be sure to make and go to all appointments, and call your doctor if you are having problems. It's also a good idea to know your test results and keep a list of the medicines you take. How can you care for yourself at home? · Do not eat too much sugar, fat, or fast foods. You can still have dessert and treats now and then. The goal is moderation. · Start small to improve your eating habits. Pay attention to portion sizes, drink less juice and soda pop, and eat more fruits and vegetables. ¨ Eat a healthy amount of food. A 3-ounce serving of meat, for example, is about the size of a deck of cards. Fill the rest of your plate with vegetables and whole grains. ¨ Limit the amount of soda and sports drinks you have every day. Drink more water when you are thirsty. ¨ Eat at least 5 servings of fruits and vegetables every day. It may seem like a lot, but it is not hard to reach this goal. A serving or helping is 1 piece of fruit, 1 cup of vegetables, or 2 cups of leafy, raw vegetables. Have an apple or some carrot sticks as an afternoon snack instead of a candy bar. Try to have fruits and/or vegetables at every meal. 
· Make exercise part of your daily routine.  You may want to start with simple activities, such as walking, bicycling, or slow swimming. Try to be active 30 to 60 minutes every day. You do not need to do all 30 to 60 minutes all at once. For example, you can exercise 3 times a day for 10 or 20 minutes. Moderate exercise is safe for most people, but it is always a good idea to talk to your doctor before starting an exercise program. 
· Keep moving. Chaorena Feeler the lawn, work in the garden, or iWitness. Take the stairs instead of the elevator at work. · If you smoke, quit. People who smoke have an increased risk for heart attack, stroke, cancer, and other lung illnesses. Quitting is hard, but there are ways to boost your chance of quitting tobacco for good. ¨ Use nicotine gum, patches, or lozenges. ¨ Ask your doctor about stop-smoking programs and medicines. ¨ Keep trying. In addition to reducing your risk of diseases in the future, you will notice some benefits soon after you stop using tobacco. If you have shortness of breath or asthma symptoms, they will likely get better within a few weeks after you quit. · Limit how much alcohol you drink. Moderate amounts of alcohol (up to 2 drinks a day for men, 1 drink a day for women) are okay. But drinking too much can lead to liver problems, high blood pressure, and other health problems. Family health If you have a family, there are many things you can do together to improve your health. · Eat meals together as a family as often as possible. · Eat healthy foods. This includes fruits, vegetables, lean meats and dairy, and whole grains. · Include your family in your fitness plan. Most people think of activities such as jogging or tennis as the way to fitness, but there are many ways you and your family can be more active. Anything that makes you breathe hard and gets your heart pumping is exercise. Here are some tips: 
¨ Walk to do errands or to take your child to school or the bus. ¨ Go for a family bike ride after dinner instead of watching TV. Where can you learn more? Go to http://bennett-sangita.info/. Enter B943 in the search box to learn more about \"A Healthy Lifestyle: Care Instructions. \" Current as of: May 12, 2017 Content Version: 11.4 © 9879-2010 Tipser. Care instructions adapted under license by [employer] (which disclaims liability or warranty for this information). If you have questions about a medical condition or this instruction, always ask your healthcare professional. [address] any warranty or liability for your use of this information. Introducing [facility]! [employer] introduces DadShed patient portal. Now you can access parts of your medical record, email your doctor's office, and request medication refills online. 1. In your internet browser, go to [email or web address] [email or web address] 2. Click on the First Time User? Click Here link in the Sign In box. You will see the New Member Sign Up page. 3. Enter your DadShed Access Code exactly as it appears below. You will not need to use this code after youve completed the sign-up process. If you do not sign up before the expiration date, you must request a new code. · DadShed Access Code: [email or web address] Expires: 7/31/2018 10:49 AM 
 
4. Enter the last four digits of your Social Security Number (xxxx) and Date of Birth (mm/dd/yyyy) as indicated and click Submit. You will be taken to the next sign-up page. 5. Create a Ometricst ID. This will be your DadShed login ID and cannot be changed, so think of one that is secure and easy to remember. 6. Create a DadShed password. You can change your password at any time. 7. Enter your Password Reset Question and Answer. This can be used at a later time if you forget your password. 8. Enter your e-mail address.  You will receive e-mail notification when new information is available in Hundo. 9. Click Sign Up. You can now view and download portions of your medical record. 10. Click the Download Summary menu link to download a portable copy of your medical information. If you have questions, please visit the Frequently Asked Questions section of the Hundo website. Remember, Hundo is NOT to be used for urgent needs. For medical emergencies, dial 911. Now available from your iPhone and Android! Please provide this summary of care documentation to your next provider. Your primary care clinician is listed as Damaso Sarah. If you have any questions after today's visit, please call [phone].

## 2018-05-13 ENCOUNTER — HOSPITAL ENCOUNTER (OUTPATIENT)
Dept: MRI IMAGING | Age: 73
Discharge: HOME | End: 2018-05-13
Attending: NURSE PRACTITIONER
Payer: MEDICARE

## 2018-05-13 DIAGNOSIS — H53.8 BLURRED VISION, BILATERAL: ICD-10-CM

## 2018-05-13 PROCEDURE — A9575 INJ GADOTERATE MEGLUMI 0.1ML: HCPCS | Performed by: RADIOLOGY

## 2018-05-13 PROCEDURE — 74011250636 HC RX REV CODE- 250/636: Performed by: RADIOLOGY

## 2018-05-13 PROCEDURE — 70553 MRI BRAIN STEM W/O & W/DYE: CPT

## 2018-05-13 RX ORDER — GADOTERATE MEGLUMINE 376.9 MG/ML
14 INJECTION INTRAVENOUS
Status: COMPLETED | OUTPATIENT
Start: 2018-05-13 | End: 2018-05-13

## 2018-05-13 RX ADMIN — GADOTERATE MEGLUMINE 14 ML: 376.9 INJECTION INTRAVENOUS at 11:00

## 2018-05-24 ENCOUNTER — OFFICE VISIT (OUTPATIENT)
Dept: INTERNAL MEDICINE CLINIC | Age: 73
End: 2018-05-24

## 2018-05-24 VITALS
HEART RATE: 65 BPM | BODY MASS INDEX: 30.13 KG/M2 | RESPIRATION RATE: 17 BRPM | SYSTOLIC BLOOD PRESSURE: 139 MMHG | TEMPERATURE: 97.7 F | HEIGHT: 61 IN | OXYGEN SATURATION: 96 % | WEIGHT: 159.6 LBS | DIASTOLIC BLOOD PRESSURE: 81 MMHG

## 2018-05-24 DIAGNOSIS — G93.0 CYST OF BRAIN: ICD-10-CM

## 2018-05-24 DIAGNOSIS — I10 ESSENTIAL HYPERTENSION: Primary | ICD-10-CM

## 2018-05-24 DIAGNOSIS — E78.2 MIXED HYPERLIPIDEMIA: ICD-10-CM

## 2018-05-24 DIAGNOSIS — J44.9 CHRONIC OBSTRUCTIVE PULMONARY DISEASE, UNSPECIFIED COPD TYPE (HCC): ICD-10-CM

## 2018-05-24 NOTE — MR AVS SNAPSHOT
[address] [facility] [address] [facility] 
[phone] Patient: Krishan Gonzalez MRN: [id number] :1945 Visit Information Date & Time Provider Department Dept. Phone Encounter #  
 2018 10:15 AM Diana Benavides MD [address] [address] Internal Medicine [phone] [id number] Your Appointments 2018 11:00 AM  
Follow Up with Jaskaran Ventura NP [facility] at [facility]) Appt Note: 4 wk f/u NN 18 [address] [place] [address]  
[phone]  
  
   
 [address] [place] [address]  
  
    
 2018  1:00 PM  
Follow Up with Carlyle Pal MD  
[facility] at [facility]) Appt Note: 6 month follow up KRU 3/19  
 [address] [place] [address]  
[phone]  
  
   
 [address] [place] [address] Upcoming Health Maintenance Date Due FOBT Q 1 YEAR AGE 50-75 1996 BREAST CANCER SCRN MAMMOGRAM 3/31/2018 Influenza Age 5 to Adult 2018 MEDICARE YEARLY EXAM 10/11/2018 GLAUCOMA SCREENING Q2Y 2019 DTaP/Tdap/Td series (2 - Td) 2024 Allergies as of 2018  Review Complete On: 2018 By: Diana Benavides MD  
  
 Severity Noted Reaction Type Reactions Prednisone  2016    Other (comments) Shakiness and headache Current Immunizations  Never Reviewed Name Date Tdap 2014 Not reviewed this visit You Were Diagnosed With   
  
 Codes Comments Essential hypertension    -  Primary ICD-10-CM: I10 
ICD-9-CM: 401.9 Mixed hyperlipidemia     ICD-10-CM: E78.2 ICD-9-CM: 272.2 Vitals BP Pulse Temp Resp Height(growth percentile) Weight(growth percentile)  139/81 (BP 1 Location: Right arm, BP Patient Position: Sitting) 65 97.7 °F (36.5 °C) (Oral) 17 5' 1\" (1.549 m) 159 lb 9.6 oz (72.4 kg) SpO2 BMI OB Status Smoking Status 96% 30.16 kg/m2 Postmenopausal Current Every Day Smoker Vitals History BMI and BSA Data Body Mass Index Body Surface Area  
 30.16 kg/m 2 1.77 m 2 Preferred Pharmacy Pharmacy Name Phone [id number] [place] [address] [facility] [phone] Your Updated Medication List  
  
   
This list is accurate as of 5/24/18 11:12 AM.  Always use your most recent med list.  
  
  
  
  
 ANORO ELLIPTA 62.5-25 mcg/actuation inhaler Generic drug:  umeclidinium-vilanterol Take 1 Puff by inhalation daily. dilTIAZem  mg ER capsule Commonly known as:  CARTIA XT  
TAKE 1 CAPSULE EVERY DAY  
  
 ibuprofen 800 mg tablet Commonly known as:  MOTRIN Take 1 Tab by mouth every eight (8) hours as needed for Pain.  
  
 lamoTRIgine 150 mg tablet Commonly known as: LaMICtal  
  
 meclizine 25 mg tablet Commonly known as:  ANTIVERT Take 1 Tab by mouth three (3) times daily as needed. simvastatin 20 mg tablet Commonly known as:  ZOCOR  
TAKE 1 TABLET EVERY NIGHT  
  
 * SYMBICORT 160-4.5 mcg/actuation Hfaa Generic drug:  budesonide-formoterol Take 2 Puffs by inhalation two (2) times a day. * budesonide-formoterol 80-4.5 mcg/actuation Hfaa Commonly known as:  SYMBICORT Take 2 Puffs by inhalation two (2) times a day. VENTOLIN HFA 90 mcg/actuation inhaler Generic drug:  albuterol INHALE 1 PUFF EVERY FOUR HOURS AS NEEDED FOR WHEEZING OR SHORTNESS OF BREATH. * Notice: This list has 2 medication(s) that are the same as other medications prescribed for you. Read the directions carefully, and ask your doctor or other care provider to review them with you. Introducing [facility]!    
 [facility] introduces CellScope patient portal. Now you can access parts of your medical record, email your doctor's office, and request medication refills online. 1. In your internet browser, go to [email or web address]/Dynamo Micropower 2. Click on the First Time User? Click Here link in the Sign In box. You will see the New Member Sign Up page. 3. Enter your Ruckus Wireless Access Code exactly as it appears below. You will not need to use this code after youve completed the sign-up process. If you do not sign up before the expiration date, you must request a new code. · Ruckus Wireless Access Code: [email or web address] Expires: 7/31/2018 10:49 AM 
 
4. Enter the last four digits of your Social Security Number (xxxx) and Date of Birth (mm/dd/yyyy) as indicated and click Submit. You will be taken to the next sign-up page. 5. Create a Ruckus Wireless ID. This will be your Ruckus Wireless login ID and cannot be changed, so think of one that is secure and easy to remember. 6. Create a Ruckus Wireless password. You can change your password at any time. 7. Enter your Password Reset Question and Answer. This can be used at a later time if you forget your password. 8. Enter your e-mail address. You will receive e-mail notification when new information is available in [address] 9. Click Sign Up. You can now view and download portions of your medical record. 10. Click the Download Summary menu link to download a portable copy of your medical information. If you have questions, please visit the Frequently Asked Questions section of the Ruckus Wireless website. Remember, Ruckus Wireless is NOT to be used for urgent needs. For medical emergencies, dial 911. Now available from your iPhone and Android! Please provide this summary of care documentation to your next provider. Your primary care clinician is listed as Birgit Walton. If you have any questions after today's visit, please call [phone].

## 2018-05-24 NOTE — PROGRESS NOTES
Follow Up Visit    Angle Hinton is a 71 y.o. female. she presents for COPD    The patient comes for follow-up visit today. She reports feeling well and breathing much better since having started on [place] by her pulmonologist.  She has had a recent pulmonary function testing and is actually going to see the pulmonologist later today. She has cut back significantly on smoking. She now smokes no more than 2-3 cigarettes in 1 day. Additionally, she has been continually seen by neurology. She had recently had some facial paresthesia and scalp pain for which an MRI was done. This MRI was stable as per the radiology report. She has an upcoming appointment with neurology to discuss this further. At this time, she reports her pain has resolved. Patient Active Problem List   Diagnosis Code    HTN (hypertension) I10    Tachyarrhythmia R00.0    Mitral valve prolapse I34.1    Hyperlipidemia E78.5    Obesity (BMI 30.0-34. 9) E66.9    Cyst of brain G93.0    Cryptogenic partial complex epilepsy ([facility] G40.219    Essential tremor G25.0         Prior to Admission medications    Medication Sig Start Date End Date Taking? Authorizing Provider   umeclidinium-vilanterol (ANORO ELLIPTA) 62.5-25 mcg/actuation inhaler Take 1 Puff by inhalation daily. Yes Historical Provider   VENTOLIN HFA 90 mcg/actuation inhaler INHALE 1 PUFF EVERY FOUR HOURS AS NEEDED FOR WHEEZING OR SHORTNESS OF BREATH. 3/22/18  Yes Troy Montoya MD   dilTIAZem CD (CARTIA XT) 120 mg ER capsule TAKE 1 CAPSULE EVERY DAY 2/26/18  Yes Troy Montoya MD   lamoTRIgine (LAMICTAL) 150 mg tablet  1/28/18  Yes Historical Provider   simvastatin (ZOCOR) 20 mg tablet TAKE 1 TABLET EVERY NIGHT 12/1/17  Yes Troy Montoya MD   ibuprofen (MOTRIN) 800 mg tablet Take 1 Tab by mouth every eight (8) hours as needed for Pain. 10/10/17  Yes Troy Montoya MD   meclizine (ANTIVERT) 25 mg tablet Take 1 Tab by mouth three (3) times daily as needed.  1/10/18 Bindu Mooney MD   budesonide-formoterol [facility]) 80-4.5 mcg/actuation HFAA Take 2 Puffs by inhalation two (2) times a day. 10/16/17   Bindu Mooney MD   budesonide-formoterol (SYMBICORT) 160-4.5 mcg/actuation HFA inhaler Take 2 Puffs by inhalation two (2) times a day. Historical Provider         Health Maintenance   Topic Date Due    FOBT Q 1 YEAR AGE 50-75  11/21/1996    BREAST CANCER SCRN MAMMOGRAM  03/31/2018    Influenza Age 9 to Adult  08/01/2018    MEDICARE YEARLY EXAM  10/11/2018    GLAUCOMA SCREENING Q2Y  09/06/2019    DTaP/Tdap/Td series (2 - Td) 11/17/2024    Hepatitis C Screening  Completed    Bone Densitometry (Dexa) Screening  Completed    ZOSTER VACCINE AGE 60>  Addressed    Pneumococcal 65+ Low/Medium Risk  Addressed       Review of Systems   Constitutional: Negative. HENT:        Facial pain as per HPI   Respiratory: Negative. Cardiovascular: Negative. Visit Vitals    /81 (BP 1 Location: Right arm, BP Patient Position: Sitting)    Pulse 65    Temp 97.7 °F (36.5 °C) (Oral)    Resp 17    Ht 5' 1\" (1.549 m)    Wt 159 lb 9.6 oz (72.4 kg)    SpO2 96%    BMI 30.16 kg/m2       Physical Exam   Constitutional: No distress. Cardiovascular: Normal rate and regular rhythm. Pulmonary/Chest: Effort normal and breath sounds normal. She has no wheezes. She has no rales. ASSESSMENT/PLAN    Diagnoses and all orders for this visit:    1. Essential hypertension -discussed with the patient to continue medications as ordered. She is doing well in this regard. 2. Mixed hyperlipidemia -monitor lipids at her next visit. 3. Chronic obstructive pulmonary disease, unspecified COPD type ([address] -she will see  pulmonology today. Further management as per the pulmonologist.    4. Cyst of brain -this is stable on MRI. We will defer further management and evaluation of her facial paresthesia to neurology.         Follow-up Disposition:  Return in about 6 months (around 11/24/2018) for Medicare Wellness Visit- 30 minute appointment.

## 2018-06-12 ENCOUNTER — OFFICE VISIT (OUTPATIENT)
Dept: NEUROLOGY | Age: 73
End: 2018-06-12

## 2018-06-12 VITALS
OXYGEN SATURATION: 97 % | RESPIRATION RATE: 16 BRPM | HEART RATE: 69 BPM | DIASTOLIC BLOOD PRESSURE: 70 MMHG | SYSTOLIC BLOOD PRESSURE: 112 MMHG | BODY MASS INDEX: 30.58 KG/M2 | WEIGHT: 162 LBS | HEIGHT: 61 IN

## 2018-06-12 DIAGNOSIS — G50.1 ATYPICAL FACIAL PAIN: ICD-10-CM

## 2018-06-12 DIAGNOSIS — G93.0 CYST OF BRAIN: ICD-10-CM

## 2018-06-12 DIAGNOSIS — H53.8 BLURRED VISION: ICD-10-CM

## 2018-06-12 DIAGNOSIS — G40.209 CRYPTOGENIC PARTIAL COMPLEX EPILEPSY (HCC): Primary | ICD-10-CM

## 2018-06-12 NOTE — PATIENT INSTRUCTIONS
Please take 81 mg aspirin every day for stroke prevention        10 [facility]   Statement to Patients  April 1, 2014      In an effort to ensure the large volume of patient prescription refills is processed in the most efficient and expeditious manner, we are asking our patients to assist us by calling your Pharmacy for all prescription refills, this will include also your  Mail Order Pharmacy. The pharmacy will contact our office electronically to continue the refill process. Please do not wait until the last minute to call your pharmacy. We need at least 48 hours (2days) to fill prescriptions. We also encourage you to call your pharmacy before going to  your prescription to make sure it is ready. With regard to controlled substance prescription refill requests (narcotic refills) that need to be picked up at our office, we ask your cooperation by providing us with at least 72 hours (3days) notice that you will need a refill. We will not refill narcotic prescription refill requests after 4:00pm on any weekday, Monday through Thursday, or after 2:00pm on Fridays, or on the weekends. We encourage everyone to explore another way of getting your prescription refill request processed using [email or web address], our patient web portal through our electronic medical record system. [email or web address] is an efficient and effective way to communicate your medication request directly to the office and  downloadable as an rene on your smart phone . [email or web address] also features a review functionality that allows you to view your medication list as well as leave messages for your physician. Are you ready to get connected? If so please review the attatched instructions or speak to any of our staff to get you set up right away! Thank you so much for your cooperation. Should you have any questions please contact our Practice Administrator.     The Physicians and Staff,  [facility]              A Healthy Lifestyle: Care Instructions  Your Care Instructions    A healthy lifestyle can help you feel good, stay at a healthy weight, and have plenty of energy for both work and play. A healthy lifestyle is something you can share with your whole family. A healthy lifestyle also can lower your risk for serious health problems, such as high blood pressure, heart disease, and diabetes. You can follow a few steps listed below to improve your health and the health of your family. Follow-up care is a key part of your treatment and safety. Be sure to make and go to all appointments, and call your doctor if you are having problems. It's also a good idea to know your test results and keep a list of the medicines you take. How can you care for yourself at home? · Do not eat too much sugar, fat, or fast foods. You can still have dessert and treats now and then. The goal is moderation. · Start small to improve your eating habits. Pay attention to portion sizes, drink less juice and soda pop, and eat more fruits and vegetables. ¨ Eat a healthy amount of food. A 3-ounce serving of meat, for example, is about the size of a deck of cards. Fill the rest of your plate with vegetables and whole grains. ¨ Limit the amount of soda and sports drinks you have every day. Drink more water when you are thirsty. ¨ Eat at least 5 servings of fruits and vegetables every day. It may seem like a lot, but it is not hard to reach this goal. A serving or helping is 1 piece of fruit, 1 cup of vegetables, or 2 cups of leafy, raw vegetables. Have an apple or some carrot sticks as an afternoon snack instead of a candy bar. Try to have fruits and/or vegetables at every meal.  · Make exercise part of your daily routine. You may want to start with simple activities, such as walking, bicycling, or slow swimming. Try to be active 30 to 60 minutes every day. You do not need to do all 30 to 60 minutes all at once.  For example, you can exercise 3 times a day for 10 or 20 minutes. Moderate exercise is safe for most people, but it is always a good idea to talk to your doctor before starting an exercise program.  · Keep moving. Pete Syed the lawn, work in the garden, or [employer]. Take the stairs instead of the elevator at work. · If you smoke, quit. People who smoke have an increased risk for heart attack, stroke, cancer, and other lung illnesses. Quitting is hard, but there are ways to boost your chance of quitting tobacco for good. ¨ Use nicotine gum, patches, or lozenges. ¨ Ask your doctor about stop-smoking programs and medicines. ¨ Keep trying. In addition to reducing your risk of diseases in the future, you will notice some benefits soon after you stop using tobacco. If you have shortness of breath or asthma symptoms, they will likely get better within a few weeks after you quit. · Limit how much alcohol you drink. Moderate amounts of alcohol (up to 2 drinks a day for men, 1 drink a day for women) are okay. But drinking too much can lead to liver problems, high blood pressure, and other health problems. Family health  If you have a family, there are many things you can do together to improve your health. · Eat meals together as a family as often as possible. · Eat healthy foods. This includes fruits, vegetables, lean meats and dairy, and whole grains. · Include your family in your fitness plan. Most people think of activities such as jogging or tennis as the way to fitness, but there are many ways you and your family can be more active. Anything that makes you breathe hard and gets your heart pumping is exercise. Here are some tips:  ¨ Walk to do errands or to take your child to school or the bus. ¨ Go for a family bike ride after dinner instead of watching TV. Where can you learn more? Go to http://bennett-sangita.info/.   Enter K708 in the search box to learn more about \"A Healthy Lifestyle: Care Instructions. \"  Current as of: May 12, 2017  Content Version: 11.4  © 4557-9894 Healthwise, [facility]. Care instructions adapted under license by [employer] (which disclaims liability or warranty for this information). If you have questions about a medical condition or this instruction, always ask your healthcare professional. [place] [address] any warranty or liability for your use of this information.

## 2018-06-12 NOTE — PROGRESS NOTES
Date:            2018    Name:  Brian Ardon  :  1945  MRN:  [id number]     PCP:  Mana Thompson MD    Chief Complaint   Patient presents with    Head Pain    Blurred Vision         HISTORY OF PRESENT ILLNESS:  Chloe Mayes is a 71 y.o., female who presents today for follow up for seizures. She was last seen in early May. She has remained seizure-free on Lamictal, EMU admission was discussed but patient was not interested. When she was last seen, she complained of new onset of burning on the right side of her scalp that lasted for a few seconds and then stopped. It happened 3-4 more times that day. This happened again on the right side of her head and then vision in both eyes got blurry. She had had an eye exam, was told that the problem was not in her eyes. She reports that her pain stopped about five days after she was last seen. She has since had an MRI of the brain that showed only a stable right temporal cyst, no evidence of ischemic process. She reports being told in the past not to take aspirin, she does have a history of DVT and is not on any blood thinner. She has stroke risk factors of tobacco abuse, hyperlipidemia, does have well-controlled hypertension and no diabetes. Discussed case with her PCP after her last visit, who agreed that she should be on aspirin. Patient was notified to start that, but she has not done so. MRI Results (most recent):    Results from [place] encounter on 18   MRI BRAIN W WO CONT   Narrative Conical history: Sudden onset of vision changes.     INDICATION:  Sudden onset vision change, rule out stroke     COMPARISON:  2016    TECHNIQUE:  MR imaging of the brain was performed with particular attention to  the orbits including sagittal T1, axial T1, T2, FLAIR, GRE, DWI/ADC; coronal T2  with fat saturation;  multiplanar pre and post T1of the whole brain and orbits  with and without fat saturation utilizing 14 mL Dotarem. FINDINGS:    There is no intracranial hemorrhage or evidence of acute infarction. There is no  Chiari or syrinx. Pituitary and infundibulum are unremarkable. The ventricles  and sulci are grossly unremarkable for patient age. Parafalcine calcification in  the parietal region. Right anterior temporal lobe cyst.. There is no significant white matter  disease. There is no acute infarction. The major intracranial vascular  flow-voids are patent. The optic nerves, extraocular muscles, and intraorbital  fat are normal.  There is no abnormal intraorbital enhancement. The optic  chiasm is normal.  There is no suprasellar mass. Impression IMPRESSION:  Stable examination. There is no evidence of acute infarction. No intracranial mass, hemorrhage or  mass effect. Stable benign-appearing right anterior temporal lobe cyst.              5. 2. 2018 recap  Niesha Shoemaker is a 71 y.o., female who presents today for follow up for seizures, which are stable, and left occipital neuralgia, which has resolved. She was last seen for that in March, has remained seizure-free on Lamictal.  There was discussion of EMU admission for this, which she is not interested in. She is here today with a new complaint of head pain and vision change. On Friday, she was at her computer and felt a burning on the right side of her scalp. This lasted a few seconds and then stopped, but happened 3-4 more times that day. On Monday she had a sharp pain on the right side of her head again and then her eyes got blurry. This has improved somewhat, but vision in both eyes is blurred, closing one or the other does not help. She had an eye exam yesterday and was told that the problem is above her eyes. She has a hard time reading close up, also cannot read road signs in the distance. A little diplopia, yesterday she saw two traffic lights when she attempted to drive. She feels a throbbing pain behind her right eye at times.  Pain that she has now on the right is different than that with her occipital neuralgia. She does have a h/o blood clot in her leg 40 years ago, is not sure if she has any familial hypercoagulopathy because that. All three of her brothers have pacemakers, defibrillators or stents. Her father had an MI at 62. She is not on aspirin, reports that she has been told not to take it because of mitral regurgitation. She does smoke, has cut down. Has COPD. Blood pressure well controlled, LDL 85 on statin. No diabetes. Current Outpatient Prescriptions   Medication Sig    umeclidinium-vilanterol (ANORO ELLIPTA) 62.5-25 mcg/actuation inhaler Take 1 Puff by inhalation daily.  VENTOLIN HFA 90 mcg/actuation inhaler INHALE 1 PUFF EVERY FOUR HOURS AS NEEDED FOR WHEEZING OR SHORTNESS OF BREATH.  dilTIAZem CD (CARTIA XT) 120 mg ER capsule TAKE 1 CAPSULE EVERY DAY    lamoTRIgine (LAMICTAL) 150 mg tablet     meclizine (ANTIVERT) 25 mg tablet Take 1 Tab by mouth three (3) times daily as needed.  simvastatin (ZOCOR) 20 mg tablet TAKE 1 TABLET EVERY NIGHT    budesonide-formoterol (SYMBICORT) 80-4.5 mcg/actuation HFAA Take 2 Puffs by inhalation two (2) times a day.  ibuprofen (MOTRIN) 800 mg tablet Take 1 Tab by mouth every eight (8) hours as needed for Pain.  budesonide-formoterol (SYMBICORT) 160-4.5 mcg/actuation HFA inhaler Take 2 Puffs by inhalation two (2) times a day. No current facility-administered medications for this visit.       Allergies   Allergen Reactions    Prednisone Other (comments)     Shakiness and headache     Past Medical History:   Diagnosis Date    Chronic obstructive pulmonary disease (HCC)     Hyperlipidemia     Seizures (HCC)      Past Surgical History:   Procedure Laterality Date    HX BREAST LUMPECTOMY      HX  SECTION      HX ORTHOPAEDIC      trigger finger    HX WISDOM TEETH EXTRACTION       Social History     Social History    Marital status:      Spouse name: N/A    Number of children: N/A    Years of education: N/A     Occupational History    Not on file. Social History Main Topics    Smoking status: Current Every Day Smoker     Packs/day: 0.50     Years: 50.00     Types: Cigarettes    Smokeless tobacco: Never Used    Alcohol use No    Drug use: No    Sexual activity: No     Other Topics Concern    Not on file     Social History Narrative     Family History   Problem Relation Age of Onset    Heart Disease Father     Stroke Father     Cancer Paternal Aunt      breast    Cancer Maternal Grandmother     Cancer Paternal Grandmother      colon    Cancer Son      colon    Cancer Paternal Aunt      breast         PHYSICAL EXAMINATION:    Visit Vitals    /70    Pulse 69    Resp 16    Ht 5' 1\" (1.549 m)    Wt 73.5 kg (162 lb)    SpO2 97%    BMI 30.61 kg/m2     General:  Well defined, nourished, and groomed individual in no acute distress. Neck: Supple, nontender, no bruits, no pain with resistance to active range of motion. Heart: Regular rate and rhythm, no murmurs, rub, or gallop. Normal S1S2. Lungs:  Clear to auscultation bilaterally with equal chest expansion, no cough, no wheeze  Musculoskeletal:  Extremities revealed no edema and had full range of motion of joints. Psych:  Good mood and bright affect    NEUROLOGICAL EXAMINATION:     Mental Status:   Alert and oriented to person, place, and time with recent and remote memory intact. Attention span and concentration are normal. Speech is fluent with a full fund of knowledge. Cranial Nerves:    II, III, IV, VI:  Visual acuity grossly intact. Extra-ocular movements are full and fluid, complains of diplopia at left extreme and upgaze. No ptosis or nystagmus. V-XII: Hearing is grossly intact. Facial features are symmetric, with normal sensation and strength. The palate rises symmetrically and the tongue protrudes midline. Sternocleidomastoids 5/5.       Motor Examination: Normal tone, bulk, and strength, 5/5 muscle strength throughout. Coordination:  Finger to nose testing was normal.   No resting or intention tremor  Gait and Station:  Steady while walking, difficulty with tandem walking. Normal arm swing. No pronator drift. No muscle wasting or fasciculations noted. ASSESSMENT AND PLAN    ICD-10-CM ICD-9-CM    1. Cryptogenic partial complex epilepsy ([facility] [address] G40.219 345.50    2. 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 Atypical facial pain G50.1 350.2    4. Blurred vision H53.8 39.8      45-year-old female seen in follow-up for seizures. She declined EMU to complete workup for this, but reports no seizures since going on Lamictal.  More recently she complained of new onset of burning facial pain on the right side with blurred vision bilaterally. Per her report, eye exam  was normal and the problem is in her brain. MRI did not show stroke, only stable cerebral cysts in the right temporal lobe. Binocular blurred vision not very suggestive of retinal artery occlusion, but patient does have risk factors and a history of DVT in her 45s. 1.  Continue Lamictal 150 mg twice daily for seizures  2. Reviewed MRI results, no stroke but strongly encouraged to take aspirin 81 mg daily for stroke prevention and closely monitor blood pressure, blood sugar, cholesterol. Also counseled that she should reduce and ultimately quit smoking  3. If facial pain becomes more persistent, could try preventative medication but currently it has resolved  4. May need repeat imaging to evaluate for stability of cyst if she develops any new neurologic symptoms    Follow-up in 3 months, call sooner with concerns      Jarrod Clifton NP    This note was created using voice recognition software. Despite editing, there may be syntax errors.

## 2018-06-12 NOTE — MR AVS SNAPSHOT
[facility] [address] [place] 
[phone] Patient: Neal Saleh MRN: [id number] :1945 Visit Information Date & Time Provider Department Dept. Phone Encounter #  
 2018 11:00 AM DEVAN Jimenez [facility] at [address] [id number] Your Appointments 2018  1:00 PM  
Follow Up with MD Lionel Jaimes [facility] at [address] [facility]) Appt Note: 6 month follow up KRU 3/19  
 [address] [place] [address]  
[phone]  
  
   
 [address] [address] Upcoming Health Maintenance Date Due FOBT Q 1 YEAR AGE 50-75 1996 BREAST CANCER SCRN MAMMOGRAM 3/31/2018 Influenza Age 5 to Adult 2018 MEDICARE YEARLY EXAM 10/11/2018 GLAUCOMA SCREENING Q2Y 2019 DTaP/Tdap/Td series (2 - Td) 2024 Allergies as of 2018  Review Complete On: 2018 By: Matt Funes LPN Severity Noted Reaction Type Reactions Prednisone  2016    Other (comments) Shakiness and headache Current Immunizations  Never Reviewed Name Date Tdap 2014 Not reviewed this visit You Were Diagnosed With   
  
 Codes Comments Cryptogenic partial complex epilepsy ([facility]    -  Primary ICD-10-CM: G65.976 ICD-9-CM: 345.50 Cyst of brain     ICD-10-CM: G93.0 ICD-9-CM: 155. 0 Atypical facial pain     ICD-10-CM: G50.1 ICD-9-CM: 350.2 Blurred vision     ICD-10-CM: H53.8 ICD-9-CM: 368.8 Vitals BP Pulse Resp Height(growth percentile) Weight(growth percentile) SpO2  
 112/70 69 16 5' 1\" (1.549 m) 162 lb (73.5 kg) 97% BMI OB Status Smoking Status 30.61 kg/m2 Postmenopausal Current Every Day Smoker Vitals History BMI and BSA Data Body Mass Index Body Surface Area 30.61 kg/m 2 1.78 m 2 Preferred Pharmacy Pharmacy Name Phone [id number]- [place], [place] [facility] [phone] Your Updated Medication List  
  
   
This list is accurate as of 6/12/18 11:22 AM.  Always use your most recent med list.  
  
  
  
  
 ANORO ELLIPTA 62.5-25 mcg/actuation inhaler Generic drug:  umeclidinium-vilanterol Take 1 Puff by inhalation daily. dilTIAZem  mg ER capsule Commonly known as:  CARTIA XT  
TAKE 1 CAPSULE EVERY DAY  
  
 ibuprofen 800 mg tablet Commonly known as:  MOTRIN Take 1 Tab by mouth every eight (8) hours as needed for Pain.  
  
 lamoTRIgine 150 mg tablet Commonly known as: LaMICtal  
  
 meclizine 25 mg tablet Commonly known as:  ANTIVERT Take 1 Tab by mouth three (3) times daily as needed. simvastatin 20 mg tablet Commonly known as:  ZOCOR  
TAKE 1 TABLET EVERY NIGHT  
  
 * SYMBICORT 160-4.5 mcg/actuation Hfaa Generic drug:  budesonide-formoterol Take 2 Puffs by inhalation two (2) times a day. * budesonide-formoterol 80-4.5 mcg/actuation Hfaa Commonly known as:  SYMBICORT Take 2 Puffs by inhalation two (2) times a day. VENTOLIN HFA 90 mcg/actuation inhaler Generic drug:  albuterol INHALE 1 PUFF EVERY FOUR HOURS AS NEEDED FOR WHEEZING OR SHORTNESS OF BREATH. * Notice: This list has 2 medication(s) that are the same as other medications prescribed for you. Read the directions carefully, and ask your doctor or other care provider to review them with you. Patient Instructions Please take 81 mg aspirin every day for stroke prevention PRESCRIPTION REFILL POLICY [facility] Statement to Patients April 1, 2014 In an effort to ensure the large volume of patient prescription refills is processed in the most efficient and expeditious manner, we are asking our patients to assist us by calling your Pharmacy for all prescription refills, this will include also your  Mail Order Pharmacy. The pharmacy will contact our office electronically to continue the refill process. Please do not wait until the last minute to call your pharmacy. We need at least 48 hours (2days) to fill prescriptions. We also encourage you to call your pharmacy before going to  your prescription to make sure it is ready. With regard to controlled substance prescription refill requests (narcotic refills) that need to be picked up at our office, we ask your cooperation by providing us with at least 72 hours (3days) notice that you will need a refill. We will not refill narcotic prescription refill requests after 4:00pm on any weekday, Monday through Thursday, or after 2:00pm on Fridays, or on the weekends. We encourage everyone to explore another way of getting your prescription refill request processed using [employer], our patient web portal through our electronic medical record system. [employer] is an efficient and effective way to communicate your medication request directly to the office and  downloadable as an rene on your smart phone . [employer] also features a review functionality that allows you to view your medication list as well as leave messages for your physician. Are you ready to get connected? If so please review the attatched instructions or speak to any of our staff to get you set up right away! Thank you so much for your cooperation. Should you have any questions please contact our Practice Administrator. The Physicians and Staff,  [facility] A Healthy Lifestyle: Care Instructions Your Care Instructions A healthy lifestyle can help you feel good, stay at a healthy weight, and have plenty of energy for both work and play. A healthy lifestyle is something you can share with your whole family.  
A healthy lifestyle also can lower your risk for serious health problems, such as high blood pressure, heart disease, and diabetes. You can follow a few steps listed below to improve your health and the health of your family. Follow-up care is a key part of your treatment and safety. Be sure to make and go to all appointments, and call your doctor if you are having problems. It's also a good idea to know your test results and keep a list of the medicines you take. How can you care for yourself at home? · Do not eat too much sugar, fat, or fast foods. You can still have dessert and treats now and then. The goal is moderation. · Start small to improve your eating habits. Pay attention to portion sizes, drink less juice and soda pop, and eat more fruits and vegetables. ¨ Eat a healthy amount of food. A 3-ounce serving of meat, for example, is about the size of a deck of cards. Fill the rest of your plate with vegetables and whole grains. ¨ Limit the amount of soda and sports drinks you have every day. Drink more water when you are thirsty. ¨ Eat at least 5 servings of fruits and vegetables every day. It may seem like a lot, but it is not hard to reach this goal. A serving or helping is 1 piece of fruit, 1 cup of vegetables, or 2 cups of leafy, raw vegetables. Have an apple or some carrot sticks as an afternoon snack instead of a candy bar. Try to have fruits and/or vegetables at every meal. 
· Make exercise part of your daily routine. You may want to start with simple activities, such as walking, bicycling, or slow swimming. Try to be active 30 to 60 minutes every day. You do not need to do all 30 to 60 minutes all at once. For example, you can exercise 3 times a day for 10 or 20 minutes. Moderate exercise is safe for most people, but it is always a good idea to talk to your doctor before starting an exercise program. 
· Keep moving. Basilia León the lawn, work in the garden, or Dial2Do. Take the stairs instead of the elevator at work. · If you smoke, quit. People who smoke have an increased risk for heart attack, stroke, cancer, and other lung illnesses. Quitting is hard, but there are ways to boost your chance of quitting tobacco for good. ¨ Use nicotine gum, patches, or lozenges. ¨ Ask your doctor about stop-smoking programs and medicines. ¨ Keep trying. In addition to reducing your risk of diseases in the future, you will notice some benefits soon after you stop using tobacco. If you have shortness of breath or asthma symptoms, they will likely get better within a few weeks after you quit. · Limit how much alcohol you drink. Moderate amounts of alcohol (up to 2 drinks a day for men, 1 drink a day for women) are okay. But drinking too much can lead to liver problems, high blood pressure, and other health problems. Family health If you have a family, there are many things you can do together to improve your health. · Eat meals together as a family as often as possible. · Eat healthy foods. This includes fruits, vegetables, lean meats and dairy, and whole grains. · Include your family in your fitness plan. Most people think of activities such as jogging or tennis as the way to fitness, but there are many ways you and your family can be more active. Anything that makes you breathe hard and gets your heart pumping is exercise. Here are some tips: 
¨ Walk to do errands or to take your child to school or the bus. ¨ Go for a family bike ride after dinner instead of watching TV. Where can you learn more? Go to http://bennett-sangita.info/. Enter C005 in the search box to learn more about \"A Healthy Lifestyle: Care Instructions. \" Current as of: May 12, 2017 Content Version: 11.4 © 7134-7649 [employer]. Care instructions adapted under license by [employer] (which disclaims liability or warranty for this information).  If you have questions about a medical condition or this instruction, always ask your healthcare professional. [address]  any warranty or liability for your use of this information. Introducing [facility] [place] [facility]! [facility] introduces [employer] patient portal. Now you can access parts of your medical record, email your doctor's office, and request medication refills online. 1. In your internet browser, go to [email or web address] [email or web address] 2. Click on the First Time User? Click Here link in the Sign In box. You will see the New Member Sign Up page. 3. Enter your [employer] Access Code exactly as it appears below. You will not need to use this code after youve completed the sign-up process. If you do not sign up before the expiration date, you must request a new code. · [employer] Access Code: [email or web address] Expires: 7/31/2018 10:49 AM 
 
4. Enter the last four digits of your Social Security Number (xxxx) and Date of Birth (mm/dd/yyyy) as indicated and click Submit. You will be taken to the next sign-up page. 5. Create a [employer] ID. This will be your [employer] login ID and cannot be changed, so think of one that is secure and easy to remember. 6. Create a [employer] password. You can change your password at any time. 7. Enter your Password Reset Question and Answer. This can be used at a later time if you forget your password. 8. Enter your e-mail address. You will receive e-mail notification when new information is available in [address] 9. Click Sign Up. You can now view and download portions of your medical record. 10. Click the Download Summary menu link to download a portable copy of your medical information. If you have questions, please visit the Frequently Asked Questions section of the [employer] website. Remember, [employer] is NOT to be used for urgent needs. For medical emergencies, dial 911. Now available from your iPhone and Android! Please provide this summary of care documentation to your next provider. Your primary care clinician is listed as Desiree Nj. If you have any questions after today's visit, please call [phone].

## 2018-07-12 DIAGNOSIS — E78.2 MIXED HYPERLIPIDEMIA: ICD-10-CM

## 2018-07-12 RX ORDER — SIMVASTATIN 20 MG/1
TABLET, FILM COATED ORAL
Qty: 90 TAB | Refills: 1 | Status: SHIPPED | OUTPATIENT
Start: 2018-07-12 | End: 2019-01-04 | Stop reason: SDUPTHER

## 2018-07-16 ENCOUNTER — HOSPITAL ENCOUNTER (EMERGENCY)
Age: 73
Discharge: HOME | End: 2018-07-16
Attending: EMERGENCY MEDICINE | Admitting: EMERGENCY MEDICINE
Payer: MEDICARE

## 2018-07-16 ENCOUNTER — APPOINTMENT (OUTPATIENT)
Dept: GENERAL RADIOLOGY | Age: 73
End: 2018-07-16
Attending: EMERGENCY MEDICINE
Payer: MEDICARE

## 2018-07-16 VITALS
DIASTOLIC BLOOD PRESSURE: 59 MMHG | WEIGHT: 162 LBS | HEIGHT: 61 IN | BODY MASS INDEX: 30.58 KG/M2 | OXYGEN SATURATION: 96 % | TEMPERATURE: 97.6 F | SYSTOLIC BLOOD PRESSURE: 126 MMHG | RESPIRATION RATE: 18 BRPM | HEART RATE: 90 BPM

## 2018-07-16 DIAGNOSIS — J44.1 COPD EXACERBATION (HCC): Primary | ICD-10-CM

## 2018-07-16 LAB
ANION GAP SERPL CALC-SCNC: 8 MMOL/L (ref 5–15)
ATRIAL RATE: 76 BPM
BASOPHILS # BLD: 0.1 K/UL (ref 0–0.1)
BASOPHILS NFR BLD: 1 % (ref 0–1)
BNP SERPL-MCNC: 94 PG/ML (ref 0–125)
BUN SERPL-MCNC: 12 MG/DL (ref 6–20)
BUN/CREAT SERPL: 15 (ref 12–20)
CALCIUM SERPL-MCNC: 8.9 MG/DL (ref 8.5–10.1)
CALCULATED P AXIS, ECG09: 81 DEGREES
CALCULATED R AXIS, ECG10: -60 DEGREES
CALCULATED T AXIS, ECG11: 60 DEGREES
CHLORIDE SERPL-SCNC: 110 MMOL/L (ref 97–108)
CO2 SERPL-SCNC: 24 MMOL/L (ref 21–32)
CREAT SERPL-MCNC: 0.79 MG/DL (ref 0.55–1.02)
DIAGNOSIS, 93000: NORMAL
DIFFERENTIAL METHOD BLD: ABNORMAL
EOSINOPHIL # BLD: 0.9 K/UL (ref 0–0.4)
EOSINOPHIL NFR BLD: 11 % (ref 0–7)
ERYTHROCYTE [DISTWIDTH] IN BLOOD BY AUTOMATED COUNT: 14.4 % (ref 11.5–14.5)
GLUCOSE SERPL-MCNC: 98 MG/DL (ref 65–100)
HCT VFR BLD AUTO: 42.1 % (ref 35–47)
HGB BLD-MCNC: 14.1 G/DL (ref 11.5–16)
IMM GRANULOCYTES # BLD: 0 K/UL (ref 0–0.04)
IMM GRANULOCYTES NFR BLD AUTO: 0 % (ref 0–0.5)
LYMPHOCYTES # BLD: 1.8 K/UL (ref 0.8–3.5)
LYMPHOCYTES NFR BLD: 21 % (ref 12–49)
MCH RBC QN AUTO: 31.1 PG (ref 26–34)
MCHC RBC AUTO-ENTMCNC: 33.5 G/DL (ref 30–36.5)
MCV RBC AUTO: 92.9 FL (ref 80–99)
MONOCYTES # BLD: 0.9 K/UL (ref 0–1)
MONOCYTES NFR BLD: 11 % (ref 5–13)
NEUTS SEG # BLD: 4.7 K/UL (ref 1.8–8)
NEUTS SEG NFR BLD: 56 % (ref 32–75)
NRBC # BLD: 0 K/UL (ref 0–0.01)
NRBC BLD-RTO: 0 PER 100 WBC
P-R INTERVAL, ECG05: 154 MS
PLATELET # BLD AUTO: 286 K/UL (ref 150–400)
PMV BLD AUTO: 9.5 FL (ref 8.9–12.9)
POTASSIUM SERPL-SCNC: 3.9 MMOL/L (ref 3.5–5.1)
Q-T INTERVAL, ECG07: 386 MS
QRS DURATION, ECG06: 82 MS
QTC CALCULATION (BEZET), ECG08: 434 MS
RBC # BLD AUTO: 4.53 M/UL (ref 3.8–5.2)
SODIUM SERPL-SCNC: 142 MMOL/L (ref 136–145)
TROPONIN I SERPL-MCNC: <0.05 NG/ML
VENTRICULAR RATE, ECG03: 76 BPM
WBC # BLD AUTO: 8.4 K/UL (ref 3.6–11)

## 2018-07-16 PROCEDURE — 93005 ELECTROCARDIOGRAM TRACING: CPT

## 2018-07-16 PROCEDURE — 74011000250 HC RX REV CODE- 250: Performed by: EMERGENCY MEDICINE

## 2018-07-16 PROCEDURE — 74011250636 HC RX REV CODE- 250/636: Performed by: EMERGENCY MEDICINE

## 2018-07-16 PROCEDURE — 74011000250 HC RX REV CODE- 250: Performed by: PHYSICIAN ASSISTANT

## 2018-07-16 PROCEDURE — 71045 X-RAY EXAM CHEST 1 VIEW: CPT

## 2018-07-16 PROCEDURE — 80048 BASIC METABOLIC PNL TOTAL CA: CPT | Performed by: PHYSICIAN ASSISTANT

## 2018-07-16 PROCEDURE — 94640 AIRWAY INHALATION TREATMENT: CPT

## 2018-07-16 PROCEDURE — 96361 HYDRATE IV INFUSION ADD-ON: CPT

## 2018-07-16 PROCEDURE — 99284 EMERGENCY DEPT VISIT MOD MDM: CPT

## 2018-07-16 PROCEDURE — 85025 COMPLETE CBC W/AUTO DIFF WBC: CPT | Performed by: PHYSICIAN ASSISTANT

## 2018-07-16 PROCEDURE — 36415 COLL VENOUS BLD VENIPUNCTURE: CPT | Performed by: PHYSICIAN ASSISTANT

## 2018-07-16 PROCEDURE — 84484 ASSAY OF TROPONIN QUANT: CPT | Performed by: PHYSICIAN ASSISTANT

## 2018-07-16 PROCEDURE — 83880 ASSAY OF NATRIURETIC PEPTIDE: CPT | Performed by: PHYSICIAN ASSISTANT

## 2018-07-16 PROCEDURE — 96374 THER/PROPH/DIAG INJ IV PUSH: CPT

## 2018-07-16 PROCEDURE — 77030029684 HC NEB SM VOL KT MONA -A

## 2018-07-16 RX ORDER — SODIUM CHLORIDE 9 MG/ML
100 INJECTION, SOLUTION INTRAVENOUS CONTINUOUS
Status: DISCONTINUED | OUTPATIENT
Start: 2018-07-16 | End: 2018-07-16 | Stop reason: HOSPADM

## 2018-07-16 RX ORDER — METHYLPREDNISOLONE 4 MG/1
TABLET ORAL
Qty: 1 DOSE PACK | Refills: 0 | Status: SHIPPED | OUTPATIENT
Start: 2018-07-16 | End: 2018-09-20 | Stop reason: ALTCHOICE

## 2018-07-16 RX ORDER — MINOCYCLINE HYDROCHLORIDE 100 MG/1
100 CAPSULE ORAL 2 TIMES DAILY
Qty: 14 CAP | Refills: 0 | Status: SHIPPED | OUTPATIENT
Start: 2018-07-16 | End: 2018-09-20 | Stop reason: ALTCHOICE

## 2018-07-16 RX ADMIN — METHYLPREDNISOLONE SODIUM SUCCINATE 125 MG: 125 INJECTION, POWDER, FOR SOLUTION INTRAMUSCULAR; INTRAVENOUS at 10:51

## 2018-07-16 RX ADMIN — ALBUTEROL SULFATE 1 DOSE: 2.5 SOLUTION RESPIRATORY (INHALATION) at 10:54

## 2018-07-16 RX ADMIN — SODIUM CHLORIDE 1000 ML: 900 INJECTION, SOLUTION INTRAVENOUS at 10:51

## 2018-07-16 RX ADMIN — ALBUTEROL SULFATE 1 DOSE: 2.5 SOLUTION RESPIRATORY (INHALATION) at 10:37

## 2018-07-16 RX ADMIN — ALBUTEROL SULFATE 1 DOSE: 2.5 SOLUTION RESPIRATORY (INHALATION) at 11:04

## 2018-07-16 NOTE — ED TRIAGE NOTES
Patient comes to the ER c/o SOB.  Patient reports taking 4 neb treatments since last night. +Smoker +COPD

## 2018-07-16 NOTE — ED NOTES
10:17 AM  I have evaluated the patient as the Provider in Triage. I have reviewed Her vital signs and the triage nurse assessment. I have talked with the patient and any available family and advised that I am the provider in triage and have ordered the appropriate study to initiate their work up based on the clinical presentation during my assessment. I have advised that the patient will be accommodated in the Main ED as soon as possible. I have also requested to contact the triage nurse or myself immediately if the patient experiences any changes in their condition during this brief waiting period. Patient has a history of COPD, onset of shortness of breath 2 days ago that worsened after cutting the grass yesterday, no relief from breathing treatments at home. + leg swelling. No fevers or chest pain.   THEO Lobato

## 2018-07-16 NOTE — ED NOTES
Discharge instructions given to patient by  nurse. Pt has been given counseling on medication use and verbalizes understanding. IV D/C. Pt wheeled off of unit in no signs of distress.

## 2018-07-16 NOTE — ED PROVIDER NOTES
HPI Comments: 71 y.o. female with past medical history significant for COPD, HLD, and seizures, who presents ambulatory with family member to the ED with cc of SOB. Pt states today she called her PCP, who referred her to the ED. She reports SOB starting 2 days ago in the setting of COPD history. She states the SOB worsened yesterday after cutting grass. She endorses associated cough that is productive \"once in a while. \" Per family, pt had a breathing treatment 2 days ago after onset of symptoms and 4 breathing treatments since last night; family estimates pt has had a total of 6 to 8 breathing treatments since onset without relief. Per family, pt has been on Anoro Ellipta x 2 months, and this is pt's 2nd ED visit this year for respiratory problems. Pt specifically denies any fevers or chills. There are no other acute medical concerns at this time. Social Hx: daily Tobacco use (0.5 ppd), denies EtOH use, denies Illicit Drug use  PCP: Jovon Nesbitt MD    Note written by Bernice Iqbal, as dictated by Shefali Galindo MD 10:32 AM      The history is provided by the patient and a relative. No  was used. Past Medical History:   Diagnosis Date    Chronic obstructive pulmonary disease (HCC)     Hyperlipidemia     Seizures (HCC)        Past Surgical History:   Procedure Laterality Date    HX BREAST LUMPECTOMY      HX  SECTION      HX ORTHOPAEDIC      trigger finger    HX WISDOM TEETH EXTRACTION           Family History:   Problem Relation Age of Onset    Heart Disease Father     Stroke Father     Cancer Paternal Aunt      breast    Cancer Maternal Grandmother     Cancer Paternal Grandmother      colon    Cancer Son      colon    Cancer Paternal Aunt      breast       Social History     Social History    Marital status:      Spouse name: N/A    Number of children: N/A    Years of education: N/A     Occupational History    Not on file.      Social History Main Topics    Smoking status: Current Every Day Smoker     Packs/day: 0.50     Years: 50.00     Types: Cigarettes    Smokeless tobacco: Never Used    Alcohol use No    Drug use: No    Sexual activity: No     Other Topics Concern    Not on file     Social History Narrative         ALLERGIES: Prednisone    Review of Systems   Constitutional: Negative for activity change, appetite change, chills, fatigue and fever. HENT: Negative for ear pain, facial swelling, sore throat and trouble swallowing. Eyes: Negative for pain, discharge and visual disturbance. Respiratory: Positive for cough and shortness of breath. Negative for chest tightness and wheezing. Cardiovascular: Negative for chest pain and palpitations. Gastrointestinal: Negative for abdominal pain, blood in stool, nausea and vomiting. Genitourinary: Negative for difficulty urinating, flank pain and hematuria. Musculoskeletal: Negative for arthralgias, joint swelling, myalgias and neck pain. Skin: Negative for color change and rash. Neurological: Negative for dizziness, weakness, numbness and headaches. Hematological: Negative for adenopathy. Does not bruise/bleed easily. Psychiatric/Behavioral: Negative for behavioral problems, confusion and sleep disturbance. All other systems reviewed and are negative. Vitals:    07/16/18 1018   BP: 155/81   Pulse: 77   Resp: 18   Temp: 97.9 °F (36.6 °C)   SpO2: 95%   Weight: 73.5 kg (162 lb)   Height: 5' 1\" (1.549 m)            Physical Exam   Constitutional: She is oriented to person, place, and time. She appears well-developed and well-nourished. No distress. HENT:   Head: Normocephalic and atraumatic. Nose: Nose normal.   Mouth/Throat: Oropharynx is clear and moist.   Eyes: Conjunctivae and EOM are normal. Pupils are equal, round, and reactive to light. No scleral icterus. Neck: Normal range of motion. Neck supple. No JVD present. No tracheal deviation present.  No thyromegaly present. No carotid bruits noted. Cardiovascular: Normal rate, regular rhythm, normal heart sounds and intact distal pulses. Exam reveals no gallop and no friction rub. No murmur heard. /106   Pulmonary/Chest: She is in respiratory distress (moderate). She has wheezes (occasional). She has no rales. She exhibits no tenderness. Struggling at inspiration, able to talk, sats 96%   Abdominal: Soft. Bowel sounds are normal. She exhibits no distension and no mass. There is no tenderness. There is no rebound and no guarding. Musculoskeletal: Normal range of motion. She exhibits no edema or tenderness. Lymphadenopathy:     She has no cervical adenopathy. Neurological: She is alert and oriented to person, place, and time. She has normal reflexes. No cranial nerve deficit. Coordination normal.   Skin: Skin is warm and dry. No rash noted. No erythema. Psychiatric: She has a normal mood and affect. Her behavior is normal. Judgment and thought content normal.   Nursing note and vitals reviewed. Note written by Bernice Moya, as dictated by Lexy Amador MD 10:32 AM      MDM  Number of Diagnoses or Management Options  COPD exacerbation [facility]): new and requires workup     Amount and/or Complexity of Data Reviewed  Clinical lab tests: ordered and reviewed  Tests in the radiology section of CPT®: ordered and reviewed  Decide to obtain previous medical records or to obtain history from someone other than the patient: yes  Review and summarize past medical records: yes  Independent visualization of images, tracings, or specimens: yes    Risk of Complications, Morbidity, and/or Mortality  Presenting problems: high  Diagnostic procedures: moderate  Management options: moderate    Patient Progress  Patient progress: stable        ED Course       Procedures    ED EKG interpretation:  NSR, rate 76, LAD, intervals normal, poor R wave progression, no acute ischemic change, no ectopy.   Note written by Bernice Smith, as dictated by Nani Novoa MD 10:35 AM    11:26 AM  Third treatment going right now, is feeling and looking a lot better, sats 97%, pressure is fine, heart rate is fine, chest sounds clear. Pt wants to go home. 11:55 AM  Pt doesn't want to stay, repeat exam normal and sats 97%, will d/c.  She is discharged on steroids and nebs and will follow up with own MD for continued difficulty

## 2018-07-17 NOTE — CALL BACK NOTE
[facility] Emergency Department Follow Up Call Record    Discharged to : Home/Family Home/Home Health/Skilled Facility/Rehab/Assisted Living/Other__Home_____  1) Did you receive your discharge instructions? Yes. This writer spoke with [employer] , verified . This patient states she is coughing less today , has not had to use inhalers today. 2) Do you understand them? Yes         3) Are you able to follow them? Yes          If NO, what can I clarify for you? 4) Do you understand your diagnosis? Yes         5) Do you know which symptoms should prompt you to call the doctor? Yes     6) Were you able to fill and  any medications that were prescribed? Yes     7) You were prescribed _minocycline, methylprednisolone__________for __cough/congestion__________________. Common side effects of this medication are__rash, loose stools__________________. This is not a complete list so please review the forms given from the pharmacy for a complete list.      8) Are there any questions about your medications? No            Have you scheduled any recommended doctors appointments (specialty, PCP) Mrs. Oden states she will be calling her Pulmonologist   If NO, what barriers are you encountering (transportation/lost contact info/cost/    For follow up.   didnt think necessary/no PCP  9) If discharged with Home Health, has the agency contacted you to schedule visit? Not applicable  10) Is there anyone available to help you at home (meals, errands, transportation    monitoring) (adult children, neighbors, private duty companions) Yes    11) Are you on a special diet? No         If YES, do you understand the requirements for this diet? Education provided? 12) If presented with cough, bronchitis, COPD, asthma, is it ok to ask that the   respiratory disease management educator call you? Not applicable  Pulmonary follows this patient.      13)  A) If presented with fall, were you issued an assistive device in the ED    Are you using? Not applicable  B) If given RX for device, have you obtained? Not applicable       If NO, barriers? C) Therapist recommended:   Are you able to implement the suggestions? Not applicable        If NO, barriers to implementation? D) Are you having any difficulties with mobility inside your home?     (steps, bed, tub)No   If YES, ask if the [facility] PT can contact patient and good time and number?  14)  At the end of your discharge instructions, there is information about accessing [facility] SERVICES, have you had a chance to review those? Yes         Do you have any questions about signing up for this service? NO   We encourage our patients to be active participants in their healthcare and this site is one of the ways to do that. It will allow you to access parts of your medical record, email your doctors office, schedule appointments, and request medications refills . 15) Are there any other questions that I can answer for you regarding    your Emergency department visit?  NO             Estimated Call Time:_____3:06 PM  ______________ Date/Time:_______________

## 2018-07-20 ENCOUNTER — OFFICE VISIT (OUTPATIENT)
Dept: INTERNAL MEDICINE CLINIC | Age: 73
End: 2018-07-20

## 2018-07-20 VITALS
WEIGHT: 159.4 LBS | DIASTOLIC BLOOD PRESSURE: 80 MMHG | TEMPERATURE: 97.7 F | HEART RATE: 64 BPM | SYSTOLIC BLOOD PRESSURE: 152 MMHG | RESPIRATION RATE: 20 BRPM | BODY MASS INDEX: 30.09 KG/M2 | OXYGEN SATURATION: 95 % | HEIGHT: 61 IN

## 2018-07-20 DIAGNOSIS — I10 ESSENTIAL HYPERTENSION: ICD-10-CM

## 2018-07-20 DIAGNOSIS — J44.1 CHRONIC OBSTRUCTIVE PULMONARY DISEASE WITH ACUTE EXACERBATION (HCC): Primary | ICD-10-CM

## 2018-07-20 PROBLEM — J44.9 COPD (CHRONIC OBSTRUCTIVE PULMONARY DISEASE) (HCC): Status: ACTIVE | Noted: 2018-07-20

## 2018-07-20 RX ORDER — PREDNISONE 20 MG/1
TABLET ORAL
COMMUNITY
Start: 2018-06-06 | End: 2018-09-20 | Stop reason: ALTCHOICE

## 2018-07-20 RX ORDER — AZITHROMYCIN 250 MG/1
TABLET, FILM COATED ORAL
COMMUNITY
Start: 2018-06-06 | End: 2018-09-20 | Stop reason: ALTCHOICE

## 2018-07-20 NOTE — PROGRESS NOTES
Vonda Murcia is a 71 y.o. female    Chief Complaint   Patient presents with   [facility] Follow Up     1. Have you been to the ER, urgent care clinic since your last visit? Hospitalized since your last visit? Yes, patient was recently treated in [facility] for shortness of breath. 2. Have you seen or consulted any other health care providers outside of the [facility] since your last visit? Include any pap smears or colon screening.   No     Visit Vitals    /80    Pulse 64    Temp 97.7 °F (36.5 °C) (Oral)    Resp 20    Ht 5' 1\" (1.549 m)    Wt 159 lb 6.4 oz (72.3 kg)    SpO2 95%    BMI 30.12 kg/m2

## 2018-07-20 NOTE — PROGRESS NOTES
Follow Up Visit    Kaz Sparks is a 71 y.o. female. she presents for Hospital Follow Up    Patient comes for follow-up after having had to go to the emergency room for shortness of breath and cough. She notes that this slowly came on several days ago. She went to the ER and was given a shot of steroid as well as some breathing treatments. She notes improving somewhat while in hospital.  She was released from the ER and reports doing much better since that time. She is going to follow-up with pulmonary in the coming weeks. She sees Dr. Leida Kanner. Patient Active Problem List   Diagnosis Code    HTN (hypertension) I10    Tachyarrhythmia R00.0    Mitral valve prolapse I34.1    Hyperlipidemia E78.5    Obesity (BMI 30.0-34. 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 Authorizing Provider   azithromycin (ZITHROMAX) 250 mg tablet  6/6/18  Yes Historical Provider   predniSONE (DELTASONE) 20 mg tablet  6/6/18  Yes Historical Provider   methylPREDNISolone (MEDROL, HARDEEP,) 4 mg tablet Take as directed for asthma until gone. 7/16/18  Yes Elan Pool MD   minocycline (MINOCIN, DYNACIN) 100 mg capsule Take 1 Cap by mouth two (2) times a day. 7/16/18  Yes Elan Pool MD   simvastatin (ZOCOR) 20 mg tablet TAKE 1 TABLET EVERY NIGHT 7/12/18  Yes Jose Alfredo Rainey MD   umeclidinium-vilanterol [facility] ELLIPTA) 62.5-25 mcg/actuation inhaler Take 1 Puff by inhalation daily.    Yes Historical Provider   VENTOLIN HFA 90 mcg/actuation inhaler INHALE 1 PUFF EVERY FOUR HOURS AS NEEDED FOR WHEEZING OR SHORTNESS OF BREATH. 3/22/18  Yes Jose Alfredo Rainey MD   dilTIAZem CD (CARTIA XT) 120 mg ER capsule TAKE 1 CAPSULE EVERY DAY 2/26/18  Yes Jose Alfredo Rainey MD   lamoTRIgine (LAMICTAL) 150 mg tablet  1/28/18  Yes Historical Provider   meclizine (ANTIVERT) 25 mg tablet Take 1 Tab by mouth three (3) times daily as needed. 1/10/18  Yes Amparo Koroma MD   budesonide-formoterol (SYMBICORT) 80-4.5 mcg/actuation HFAA Take 2 Puffs by inhalation two (2) times a day. 10/16/17  Yes Amparo Koroma MD   ibuprofen (MOTRIN) 800 mg tablet Take 1 Tab by mouth every eight (8) hours as needed for Pain. 10/10/17  Yes Amparo Koroma MD   budesonide-formoterol (SYMBICORT) 160-4.5 mcg/actuation HFA inhaler Take 2 Puffs by inhalation two (2) times a day. Yes Historical Provider         Health Maintenance   Topic Date Due    FOBT Q 1 YEAR AGE 50-75  11/21/1996    BREAST CANCER SCRN MAMMOGRAM  03/31/2018    Influenza Age 9 to Adult  08/01/2018    MEDICARE YEARLY EXAM  10/11/2018    GLAUCOMA SCREENING Q2Y  09/06/2019    DTaP/Tdap/Td series (2 - Td) 11/17/2024    Hepatitis C Screening  Completed    Bone Densitometry (Dexa) Screening  Completed    ZOSTER VACCINE AGE 60>  Addressed    Pneumococcal 65+ Low/Medium Risk  Addressed       Review of Systems   Constitutional: Negative. Respiratory: Positive for cough and shortness of breath (improving ). Visit Vitals    /80    Pulse 64    Temp 97.7 °F (36.5 °C) (Oral)    Resp 20    Ht 5' 1\" (1.549 m)    Wt 159 lb 6.4 oz (72.3 kg)    SpO2 95%    BMI 30.12 kg/m2       Physical Exam   Constitutional: She appears well-developed and well-nourished. Cardiovascular: Normal rate and regular rhythm. Pulmonary/Chest: Effort normal and breath sounds normal.         ASSESSMENT/PLAN    Diagnoses and all orders for this visit:    1. Chronic obstructive pulmonary disease with acute exacerbation (HCC) -patient appears to be doing well. She is tolerating medications and has no wheezing on exam today. I have advised her to  continue medications and follow-up with pulmonary. 2. Essential hypertension -appears relatively stable although blood pressure slightly up likely from steroid administration.   We will follow this at her next visit. Follow-up Disposition:  Return in about 3 months (around 10/22/2018).

## 2018-07-20 NOTE — MR AVS SNAPSHOT
[facility] [address] [facility] [address] [place] [address] 
[phone] Patient: Jimmy Jay MRN: [id number] :1945 Visit Information Date & Time Provider Department Dept. Phone Encounter #  
 2018 11:00 AM Maxine Astorga MD [address] [address] Internal Medicine [phone] [id number] Follow-up Instructions Return in about 3 months (around 10/22/2018). Your Appointments 2018  1:00 PM  
Follow Up with Kimmy Still MD  
[facility] Neu[facility] at [facility]) Appt Note: 6 month follow up KRU 3/19  
 [address] [place] [address] [address] [address]  
[phone]  
  
   
 [address] [place] [address]  [id number] Upcoming Health Maintenance Date Due FOBT Q 1 YEAR AGE 50-75 1996 BREAST CANCER SCRN MAMMOGRAM 3/31/2018 Influenza Age 5 to Adult 2018 MEDICARE YEARLY EXAM 10/11/2018 GLAUCOMA SCREENING Q2Y 2019 DTaP/Tdap/Td series (2 - Td) 2024 Allergies as of 2018  Review Complete On: 2018 By: Annalisa Alvarez MA Severity Noted Reaction Type Reactions Prednisone  2016    Other (comments) Shakiness and headache Current Immunizations  Never Reviewed Name Date Tdap 2014 Not reviewed this visit You Were Diagnosed With   
  
 Codes Comments Chronic obstructive pulmonary disease with acute exacerbation (HCC)    -  Primary ICD-10-CM: J44.1 ICD-9-CM: 491.21 Essential hypertension     ICD-10-CM: I10 
ICD-9-CM: 401.9 Vitals BP Pulse Temp Resp Height(growth percentile) Weight(growth percentile) 152/80 64 97.7 °F (36.5 °C) (Oral) 20 5' 1\" (1.549 m) 159 lb 6.4 oz (72.3 kg) SpO2 BMI OB Status Smoking Status 95% 30.12 kg/m2 Postmenopausal Current Every Day Smoker Vitals History BMI and BSA Data Body Mass Index Body Surface Area 30.12 kg/m 2 1.76 m 2 Preferred Pharmacy Pharmacy Name Phone [facility] [address] [place] [address] 9103 [facility] [address] [phone] Your Updated Medication List  
  
   
This list is accurate as of 7/20/18 11:50 AM.  Always use your most recent med list.  
  
  
  
  
 ANORO ELLIPTA 62.5-25 mcg/actuation inhaler Generic drug:  umeclidinium-vilanterol Take 1 Puff by inhalation daily. azithromycin 250 mg tablet Commonly known as:  ZITHROMAX  
  
 dilTIAZem  mg ER capsule Commonly known as:  CARTIA XT  
TAKE 1 CAPSULE EVERY DAY  
  
 ibuprofen 800 mg tablet Commonly known as:  MOTRIN Take 1 Tab by mouth every eight (8) hours as needed for Pain.  
  
 lamoTRIgine 150 mg tablet Commonly known as: LaMICtal  
  
 meclizine 25 mg tablet Commonly known as:  ANTIVERT Take 1 Tab by mouth three (3) times daily as needed. methylPREDNISolone 4 mg tablet Commonly known as:  MEDROL (HARDEEP) Take as directed for asthma until gone. minocycline 100 mg capsule Commonly known as:  Natali Hark Take 1 Cap by mouth two (2) times a day. predniSONE 20 mg tablet Commonly known as:  DELTASONE  
  
 simvastatin 20 mg tablet Commonly known as:  ZOCOR  
TAKE 1 TABLET EVERY NIGHT  
  
 * SYMBICORT 160-4.5 mcg/actuation Hfaa Generic drug:  budesonide-formoterol Take 2 Puffs by inhalation two (2) times a day. * budesonide-formoterol 80-4.5 mcg/actuation Hfaa Commonly known as:  SYMBICORT Take 2 Puffs by inhalation two (2) times a day. VENTOLIN HFA 90 mcg/actuation inhaler Generic drug:  albuterol INHALE 1 PUFF EVERY FOUR HOURS AS NEEDED FOR WHEEZING OR SHORTNESS OF BREATH. * Notice: This list has 2 medication(s) that are the same as other medications prescribed for you. Read the directions carefully, and ask your doctor or other care provider to review them with you. Follow-up Instructions Return in about 3 months (around 10/22/2018). Introducing [facility] & HEALTH SERVICES! Maxi Madison introduces mimoOn patient portal. Now you can access parts of your medical record, email your doctor's office, and request medication refills online. 1. In your internet browser, go to [email or web address]://[email or web address] 2. Click on the First Time User? Click Here link in the Sign In box. You will see the New Member Sign Up page. 3. Enter your mimoOn Access Code exactly as it appears below. You will not need to use this code after youve completed the sign-up process. If you do not sign up before the expiration date, you must request a new code. · mimoOn Access Code: [email or web address] Expires: 7/31/2018 10:49 AM 
 
4. Enter the last four digits of your Social Security Number (xxxx) and Date of Birth (mm/dd/yyyy) as indicated and click Submit. You will be taken to the next sign-up page. 5. Create a mimoOn ID. This will be your mimoOn login ID and cannot be changed, so think of one that is secure and easy to remember. 6. Create a mimoOn password. You can change your password at any time. 7. Enter your Password Reset Question and Answer. This can be used at a later time if you forget your password. 8. Enter your e-mail address. You will receive e-mail notification when new information is available in [address] 9. Click Sign Up. You can now view and download portions of your medical record. 10. Click the Download Summary menu link to download a portable copy of your medical information. If you have questions, please visit the Frequently Asked Questions section of the mimoOn website. Remember, mimoOn is NOT to be used for urgent needs. For medical emergencies, dial 911. Now available from your iPhone and Android! Please provide this summary of care documentation to your next provider. Your primary care clinician is listed as Scar Fuller.  If you have any questions after today's visit, please call [phone].

## 2018-08-20 ENCOUNTER — TELEPHONE (OUTPATIENT)
Dept: NEUROLOGY | Age: 73
End: 2018-08-20

## 2018-08-20 NOTE — TELEPHONE ENCOUNTER
----- Message from Nerissa Ovalle sent at 8/20/2018 10:35 AM EDT -----  Regarding: Dr. Janeen Calabrese  Mr. Kavon Duncan, pt's oldest son, request for test results of eye test done June/ July. Requesting for results to be sent to Dr. Nilsa Kowalski, [facility] (N)[phone] ([phone]. An appt is scheduled for Wednesday 09/05/18. Best contact number [phone].

## 2018-08-21 NOTE — TELEPHONE ENCOUNTER
Clarified with son, They needed patients MRI sent to [address] [place] [address]. I verbalized understanding. Faxed MRI report as requested.

## 2018-09-07 ENCOUNTER — TELEPHONE (OUTPATIENT)
Dept: INTERNAL MEDICINE CLINIC | Age: 73
End: 2018-09-07

## 2018-09-07 NOTE — TELEPHONE ENCOUNTER
Left message for a return call , are there any specific tests that will be required prior to appt in Oct?

## 2018-09-12 DIAGNOSIS — I34.1 MITRAL VALVE PROLAPSE: ICD-10-CM

## 2018-09-12 DIAGNOSIS — Z12.11 ENCOUNTER FOR SCREENING FECAL OCCULT BLOOD TESTING: ICD-10-CM

## 2018-09-12 DIAGNOSIS — J44.1 CHRONIC OBSTRUCTIVE PULMONARY DISEASE WITH ACUTE EXACERBATION (HCC): ICD-10-CM

## 2018-09-12 DIAGNOSIS — E78.2 MIXED HYPERLIPIDEMIA: ICD-10-CM

## 2018-09-12 DIAGNOSIS — I10 ESSENTIAL HYPERTENSION: Primary | ICD-10-CM

## 2018-09-12 DIAGNOSIS — E66.9 OBESITY (BMI 30.0-34.9): ICD-10-CM

## 2018-09-20 ENCOUNTER — OFFICE VISIT (OUTPATIENT)
Dept: NEUROLOGY | Age: 73
End: 2018-09-20

## 2018-09-20 VITALS
DIASTOLIC BLOOD PRESSURE: 80 MMHG | HEART RATE: 67 BPM | RESPIRATION RATE: 16 BRPM | OXYGEN SATURATION: 95 % | WEIGHT: 166 LBS | SYSTOLIC BLOOD PRESSURE: 130 MMHG | BODY MASS INDEX: 31.34 KG/M2 | HEIGHT: 61 IN

## 2018-09-20 DIAGNOSIS — R51.9 TEMPORAL PAIN: ICD-10-CM

## 2018-09-20 DIAGNOSIS — H53.8 BLURRED VISION: ICD-10-CM

## 2018-09-20 DIAGNOSIS — G40.209 CRYPTOGENIC PARTIAL COMPLEX EPILEPSY (HCC): Primary | ICD-10-CM

## 2018-09-20 RX ORDER — ASPIRIN 81 MG/1
81 TABLET ORAL DAILY
COMMUNITY

## 2018-09-20 RX ORDER — LAMOTRIGINE 150 MG/1
150 TABLET ORAL 2 TIMES DAILY
Qty: 180 TAB | Refills: 3 | Status: SHIPPED | OUTPATIENT
Start: 2018-09-20 | End: 2020-01-22

## 2018-09-20 NOTE — PROGRESS NOTES
Chief Complaint Patient presents with  Epilepsy HISTORY OF PRESENT ILLNESS Erica Craven came back for follow up. She was last seen by me in March 2018. She has not had any further seizures since then and is currently taking lamotrigine 150 mg 1 tablet in the morning and only half at night. Since her last visit, she has had another visit with Snow Lopes NP regarding pain behind her right eye, headache that has been coming and going. She also keeps a slight soreness in the right temple. She says that the pain comes, lasts a few seconds and then goes away. This has been accompanied with double vision. She is seeing ophthalmology and there is a plan to get cataract removed next month. 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 Rarely she will get a brief pain in the back of her head. She had an EEG for evaluation of spells where she had alteration of awareness and could not respond to people. EEG showed epileptiform activity intermittently out of the left temporal head region. She was also having occasional weakness in the right upper and lower extremity. She has never had any witnessed generalized convulsion. 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 This was stable on her last scan. She has been noticing tremor in her hands mainly on the right side especially when she tries to write. No resting tremor. No difficulties with mobility or ambulation. No falls. Current Outpatient Prescriptions Medication Sig  
 fluticasone/umeclidin/vilanter (TRELEGY ELLIPTA IN) Take  by inhalation.  aspirin delayed-release 81 mg tablet Take  by mouth daily.  lamoTRIgine (LAMICTAL) 150 mg tablet Take 1 Tab by mouth two (2) times a day.  simvastatin (ZOCOR) 20 mg tablet TAKE 1 TABLET EVERY NIGHT  VENTOLIN HFA 90 mcg/actuation inhaler INHALE 1 PUFF EVERY FOUR HOURS AS NEEDED FOR WHEEZING OR SHORTNESS OF BREATH.  dilTIAZem CD (CARTIA XT) 120 mg ER capsule TAKE 1 CAPSULE EVERY DAY  meclizine (ANTIVERT) 25 mg tablet Take 1 Tab by mouth three (3) times daily as needed.  ibuprofen (MOTRIN) 800 mg tablet Take 1 Tab by mouth every eight (8) hours as needed for Pain.  umeclidinium-vilanterol (ANORO ELLIPTA) 62.5-25 mcg/actuation inhaler Take 1 Puff by inhalation daily.  budesonide-formoterol (SYMBICORT) 80-4.5 mcg/actuation HFAA Take 2 Puffs by inhalation two (2) times a day.  budesonide-formoterol (SYMBICORT) 160-4.5 mcg/actuation HFA inhaler Take 2 Puffs by inhalation two (2) times a day. No current facility-administered medications for this visit. PHYSICAL EXAMINATION:   
 
 
NEUROLOGICAL EXAMINATION:    
Mental Status:   Alert and oriented to person, place, and time. Attention span and concentration are normal. Speech is fluent with a full fund of knowledge. Cranial Nerves:   
II, III, IV, VI:  Visual acuity grossly intact. Visual fields are normal.   
Pupils are equal, round, and reactive to light and accommodation. Extra-ocular movements are full and fluid. V-XII: Hearing is grossly intact. Facial features are symmetric, with normal sensation and strength. The palate rises symmetrically and the tongue protrudes midline. Sternocleidomastoids 5/5. Motor Examination: Slight weakness of the  strength on the right and slight weakness of the great toe extension on the right. Normal tone, bulk, and strength on the left side. No cogwheel rigidity or clonus present. Sensory exam:  Normal throughout. Coordination:  Finger to nose and rapid arm movement testing was normal.  Fine, 9-10 Hz n postural tremor was noted in the fingers of the right hand when arms were outstretched. No resting tremor Gait and Station:  Steady. Normal arm swing. No Rhomberg or pronator drift. No muscle wasting or fasiculations noted. Reflexes:  DTRs 2+ throughout. Toes downgoing. Rocco Nury / IMAGING Lab Results Component Value Date/Time Sodium 142 07/16/2018 10:35 AM  
 Potassium 3.9 07/16/2018 10:35 AM  
 Chloride 110 (H) 07/16/2018 10:35 AM  
 CO2 24 07/16/2018 10:35 AM  
 Anion gap 8 07/16/2018 10:35 AM  
 Glucose 98 07/16/2018 10:35 AM  
 BUN 12 07/16/2018 10:35 AM  
 Creatinine 0.79 07/16/2018 10:35 AM  
 BUN/Creatinine ratio 15 07/16/2018 10:35 AM  
 GFR est AA >60 07/16/2018 10:35 AM  
 GFR est non-AA >60 07/16/2018 10:35 AM  
 Calcium 8.9 07/16/2018 10:35 AM  
 Bilirubin, total 0.3 02/01/2018 04:03 PM  
 AST (SGOT) 21 02/01/2018 04:03 PM  
 Alk. phosphatase 89 02/01/2018 04:03 PM  
 Protein, total 7.2 02/01/2018 04:03 PM  
 Albumin 3.6 02/01/2018 04:03 PM  
 Globulin 3.6 02/01/2018 04:03 PM  
 A-G Ratio 1.0 (L) 02/01/2018 04:03 PM  
 ALT (SGPT) 30 02/01/2018 04:03 PM  
 
Lab Results Component Value Date/Time WBC 8.4 07/16/2018 10:35 AM  
 HGB 14.1 07/16/2018 10:35 AM  
 HCT 42.1 07/16/2018 10:35 AM  
 PLATELET 439 89/19/4841 10:35 AM  
 MCV 92.9 07/16/2018 10:35 AM  
 
Last Lamotrigine level was 6.3 ASSESSMENT 
  ICD-10-CM ICD-9-CM 1. Cryptogenic partial complex epilepsy (HCC) G40.219 345.50 LAMOTRIGINE (LAMICTAL)  
   lamoTRIgine (LAMICTAL) 150 mg tablet 2. Blurred vision H53.8 368.8 SED RATE (ESR) 3. Temporal pain R51 784.0 SED RATE (ESR) DISCUSSION Ms. Bob Richards has had episodes where she could not respond to move. Complex partial seizures are suspected given abnormal EEG. She is doing well from seizure standpoint and should continue lamotrigine 150 mg twice a day. Some of these episodes in the past were related to stress and there has been a concern that these may be nonepileptic. We may consider EMU in the future if the spells return. She has been having retro-orbital pain in the right side and has some temporal tenderness.   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 Scans have been stable over the past 2-3 years. She has benign essential tremor involving her arms/hands. It sometimes affects her head as well. This does not seem to be bothering her and wishes to defer any other pharmacologic therapy. Continue periodic follow-up Naheed Gamez MD 
Diplomate, American Board of Psychiatry & Neurology (Neurology) Damian Cervantes Board of Psychiatry & Neurology (Clinical Neurophysiology) Diplomate, [address] [place] [address] Board of Electrodiagnostic Medicine

## 2018-09-20 NOTE — MR AVS SNAPSHOT
[facility] [address] [place] [address] 
[phone] Patient: Ted Meléndez MRN: [id number] :1945 Visit Information Date & Time Provider Department Dept. Phone Encounter #  
 2018  9:40 AM Trice Negrete MD [facility] Neurology Clinic at [address] [facility] [id number] Follow-up Instructions Return in about 2 months (around 2018). Your Appointments 10/22/2018 10:15 AM  
ROUTINE CARE with Kerry Tellez MD  
[facility] Internal Medicine [address] [facility]) Appt Note: 3-month f/u  
 [address] [facility] [address]  
[address] [address] [address] [place] [address] Upcoming Health Maintenance Date Due FOBT Q 1 YEAR AGE 50-75 1996 BREAST CANCER SCRN MAMMOGRAM 3/31/2018 Influenza Age 5 to Adult 2018 MEDICARE YEARLY EXAM 10/11/2018 GLAUCOMA SCREENING Q2Y 2019 DTaP/Tdap/Td series (2 - Td) 2024 Allergies as of 2018  Review Complete On: 2018 By: Trice Negrete MD  
  
 Severity Noted Reaction Type Reactions Prednisone  2016    Other (comments) Shakiness and headache Current Immunizations  Never Reviewed Name Date Tdap 2014 Not reviewed this visit You Were Diagnosed With   
  
 Codes Comments Cryptogenic partial complex epilepsy ([facility] [address]    -  Primary ICD-10-CM: L41.930 ICD-9-CM: 345.50 Blurred vision     ICD-10-CM: H53.8 ICD-9-CM: 368.8 Temporal pain     ICD-10-CM: U72 ICD-9-CM: 056. 0 Vitals BP Pulse Resp Height(growth percentile) Weight(growth percentile) SpO2  
 130/80 67 16 5' 1\" (1.549 m) 166 lb (75.3 kg) 95% BMI OB Status Smoking Status 31.37 kg/m2 Postmenopausal Current Every Day Smoker Vitals History BMI and BSA Data  Body Mass Index Body Surface Area  
 31.37 kg/m 2 1.8 m 2  
  
  
 Preferred Pharmacy Pharmacy Name Phone [facility] [address] [place] [address] - [address] [address] [phone] Your Updated Medication List  
  
   
This list is accurate as of 9/20/18 10:14 AM.  Always use your most recent med list.  
  
  
  
  
 ANORO ELLIPTA 62.5-25 mcg/actuation inhaler Generic drug:  umeclidinium-vilanterol Take 1 Puff by inhalation daily. aspirin delayed-release 81 mg tablet Take  by mouth daily. dilTIAZem  mg ER capsule Commonly known as:  CARTIA XT  
TAKE 1 CAPSULE EVERY DAY  
  
 ibuprofen 800 mg tablet Commonly known as:  MOTRIN Take 1 Tab by mouth every eight (8) hours as needed for Pain.  
  
 lamoTRIgine 150 mg tablet Commonly known as: LaMICtal  
Take 1 Tab by mouth two (2) times a day. meclizine 25 mg tablet Commonly known as:  ANTIVERT Take 1 Tab by mouth three (3) times daily as needed. simvastatin 20 mg tablet Commonly known as:  ZOCOR  
TAKE 1 TABLET EVERY NIGHT  
  
 * SYMBICORT 160-4.5 mcg/actuation Hfaa Generic drug:  budesonide-formoterol Take 2 Puffs by inhalation two (2) times a day. * budesonide-formoterol 80-4.5 mcg/actuation Hfaa Commonly known as:  SYMBICORT Take 2 Puffs by inhalation two (2) times a day. TRELEGY ELLIPTA IN Take  by inhalation. VENTOLIN HFA 90 mcg/actuation inhaler Generic drug:  albuterol INHALE 1 PUFF EVERY FOUR HOURS AS NEEDED FOR WHEEZING OR SHORTNESS OF BREATH. * Notice: This list has 2 medication(s) that are the same as other medications prescribed for you. Read the directions carefully, and ask your doctor or other care provider to review them with you. Prescriptions Sent to Pharmacy Refills  
 lamoTRIgine (LAMICTAL) 150 mg tablet 3 Sig: Take 1 Tab by mouth two (2) times a day. Class: Normal  
 Pharmacy: [address] [place] [address], [facility] [address] [place] [address] #: [phone]  Route: Oral  
  
 We Performed the Following LAMOTRIGINE (LAMICTAL) [26394 CPT(R)] SED RATE (ESR) [id number] CPT(R)] Follow-up Instructions Return in about 2 months (around 11/20/2018). Introducing [facility]! [employer] introduces [employer] patient portal. Now you can access parts of your medical record, email your doctor's office, and request medication refills online. 1. In your internet browser, go to [email or web address] 2. Click on the First Time User? Click Here link in the Sign In box. You will see the New Member Sign Up page. 3. Enter your [employer] Access Code exactly as it appears below. You will not need to use this code after youve completed the sign-up process. If you do not sign up before the expiration date, you must request a new code. · [employer] Access Code: [email or web address] Expires: 11/13/2018  5:20 AM 
 
4. Enter the last four digits of your Social Security Number (xxxx) and Date of Birth (mm/dd/yyyy) as indicated and click Submit. You will be taken to the next sign-up page. 5. Create a [employer] ID. This will be your [employer] login ID and cannot be changed, so think of one that is secure and easy to remember. 6. Create a [employer] password. You can change your password at any time. 7. Enter your Password Reset Question and Answer. This can be used at a later time if you forget your password. 8. Enter your e-mail address. You will receive e-mail notification when new information is available in [address] 9. Click Sign Up. You can now view and download portions of your medical record. 10. Click the Download Summary menu link to download a portable copy of your medical information. If you have questions, please visit the Frequently Asked Questions section of the [employer] website. Remember, [employer] is NOT to be used for urgent needs. For medical emergencies, dial 911. Now available from your iPhone and Android! Please provide this summary of care documentation to your next provider. Your primary care clinician is listed as Saundra Emanuel. If you have any questions after today's visit, please call [phone].

## 2018-09-20 NOTE — PROGRESS NOTES
Patient is here for follow up Patient denies seizures since last seen Patient getting headaches 2-3 times a week Possible from cataracts

## 2018-09-21 LAB
ERYTHROCYTE [SEDIMENTATION RATE] IN BLOOD BY WESTERGREN METHOD: 8 MM/HR (ref 0–40)
LAMOTRIGINE SERPL-MCNC: 4.3 UG/ML (ref 2–20)

## 2018-09-24 ENCOUNTER — TELEPHONE (OUTPATIENT)
Dept: NEUROLOGY | Age: 73
End: 2018-09-24

## 2018-09-24 NOTE — TELEPHONE ENCOUNTER
----- Message from Mackenzie Avalos MD sent at 9/23/2018  3:56 PM EDT -----  Bloodwork was normal. Please inform.

## 2018-09-28 RX ORDER — DILTIAZEM HYDROCHLORIDE 120 MG/1
CAPSULE, EXTENDED RELEASE ORAL
Qty: 90 CAP | Refills: 1 | Status: SHIPPED | OUTPATIENT
Start: 2018-09-28 | End: 2018-10-01 | Stop reason: SDUPTHER

## 2018-09-28 RX ORDER — DILTIAZEM HYDROCHLORIDE 120 MG/1
CAPSULE, COATED, EXTENDED RELEASE ORAL
Qty: 90 CAP | Refills: 1 | Status: CANCELLED | OUTPATIENT
Start: 2018-09-28

## 2018-10-01 ENCOUNTER — TELEPHONE (OUTPATIENT)
Dept: INTERNAL MEDICINE CLINIC | Age: 73
End: 2018-10-01

## 2018-10-01 DIAGNOSIS — I10 ESSENTIAL HYPERTENSION: Primary | ICD-10-CM

## 2018-10-01 RX ORDER — DILTIAZEM HYDROCHLORIDE 120 MG/1
CAPSULE, COATED, EXTENDED RELEASE ORAL
Qty: 30 CAP | Refills: 1 | Status: SHIPPED | OUTPATIENT
Start: 2018-10-01 | End: 2019-04-08 | Stop reason: SDUPTHER

## 2018-10-01 NOTE — TELEPHONE ENCOUNTER
The patient would like a small script call into the pharmacy she is completely out of the script  Diltiazem

## 2018-10-17 LAB — HEMOCCULT STL QL IA: POSITIVE

## 2018-10-22 ENCOUNTER — DOCUMENTATION ONLY (OUTPATIENT)
Dept: INTERNAL MEDICINE CLINIC | Age: 73
End: 2018-10-22

## 2018-10-22 ENCOUNTER — OFFICE VISIT (OUTPATIENT)
Dept: INTERNAL MEDICINE CLINIC | Age: 73
End: 2018-10-22

## 2018-10-22 ENCOUNTER — TELEPHONE (OUTPATIENT)
Dept: INTERNAL MEDICINE CLINIC | Age: 73
End: 2018-10-22

## 2018-10-22 VITALS
TEMPERATURE: 97.3 F | OXYGEN SATURATION: 97 % | DIASTOLIC BLOOD PRESSURE: 80 MMHG | HEIGHT: 61 IN | RESPIRATION RATE: 19 BRPM | HEART RATE: 75 BPM | WEIGHT: 165.4 LBS | SYSTOLIC BLOOD PRESSURE: 140 MMHG | BODY MASS INDEX: 31.23 KG/M2

## 2018-10-22 DIAGNOSIS — R05.9 COUGH: ICD-10-CM

## 2018-10-22 DIAGNOSIS — Z71.89 ADVANCE CARE PLANNING: ICD-10-CM

## 2018-10-22 DIAGNOSIS — Z13.39 SCREENING FOR ALCOHOLISM: ICD-10-CM

## 2018-10-22 DIAGNOSIS — Z23 ENCOUNTER FOR IMMUNIZATION: ICD-10-CM

## 2018-10-22 DIAGNOSIS — Z00.00 MEDICARE ANNUAL WELLNESS VISIT, SUBSEQUENT: Primary | ICD-10-CM

## 2018-10-22 DIAGNOSIS — Z12.11 COLON CANCER SCREENING: ICD-10-CM

## 2018-10-22 DIAGNOSIS — Z23 NEED FOR VACCINATION WITH 13-POLYVALENT PNEUMOCOCCAL CONJUGATE VACCINE: ICD-10-CM

## 2018-10-22 DIAGNOSIS — Z13.31 SCREENING FOR DEPRESSION: ICD-10-CM

## 2018-10-22 DIAGNOSIS — R60.0 BILATERAL LOWER EXTREMITY EDEMA: ICD-10-CM

## 2018-10-22 RX ORDER — CODEINE PHOSPHATE AND GUAIFENESIN 10; 100 MG/5ML; MG/5ML
5 SOLUTION ORAL
Qty: 118 ML | Refills: 0 | Status: SHIPPED | OUTPATIENT
Start: 2018-10-22 | End: 2019-01-22 | Stop reason: ALTCHOICE

## 2018-10-22 NOTE — Clinical Note
Please fax the note to Dr. Ismael Bland office. [phone] also inform the patient that we would like to have her echo done prior to her having her procedure. I have sent that recommendation to the ophthalmologist as well.

## 2018-10-22 NOTE — PROGRESS NOTES
Follow Up Visit Estuardo Young is a 71 y.o. female. she presents for Annual Wellness Visit Of note, she is also having a planned cataract surgery: 
 
 Procedure/Surgery: Cataract surgery Date of Procedure/Surgery: 10/30/18 Surgeon: Dr. Elena Leon Primary Physician: Elisabeth Domingo MD 
 
Review documentation of nursing and agree with recommendations. The patient has had a heme positive stool. She will require a colonoscopy and GI referral.  She has never had a colonoscopy. She also does have a history of osteoporosis on DEXA. This was done in 2016. At that time, we discussed bisphosphonate treatment. The patient declined at that time. She was advised to take calcium and vitamin D. She has not done so. Discussed the need for her to begin this. We will consider a repeat DEXA in the coming 6-10 months. The patient does also require pneumonia vaccines. She has a history of COPD and has been asked to do this in the past.  She has not yet done this. We are considering a shingles vaccination. She wishes to hold on this. We will complete this at a later date. Lastly, she does have some coughing which is secondary to her COPD as well as a resolving URI. She is concerned that she may cough while undergoing her cataract surgery. She is looking for a cough suppressant which could prevent this situation. Lastly, she does complain of some lower extremity swelling. She is concerned about this and has noted over the previous 3-4 weeks. She has no previous history of congestive heart failure however she does smoke regularly. She denies chest pain or shortness of breath specifically with exertion. She tells me that she does become somewhat short of breath (likely commiserate with her diagnosis of COPD) when walking vigorously. Latex Allergy: none Recent use of: ASA Tetanus up to date: last tetanus booster within 10 years Anesthesia Complications: None History of abnormal bleeding : None History of Blood Transfusions: no 
Health Care Directive or Living Will: yes EKG: EKG FINDINGS - normal EKG, normal sinus rhythm (July 2018) CXR: was negative for infiltrate, effusion, pneumothorax, or wide mediastinum (July 2018) Labs:  
Lab Results Component Value Date/Time WBC 8.4 07/16/2018 10:35 AM  
 HGB 14.1 07/16/2018 10:35 AM  
 HCT 42.1 07/16/2018 10:35 AM  
 PLATELET 937 60/47/5388 10:35 AM  
 MCV 92.9 07/16/2018 10:35 AM  
 
Lab Results Component Value Date/Time Cholesterol, total 167 10/14/2017 08:05 AM  
 HDL Cholesterol 73 10/14/2017 08:05 AM  
 LDL, calculated 82 10/14/2017 08:05 AM  
 Triglyceride 59 10/14/2017 08:05 AM  
 CHOL/HDL Ratio 2.3 06/10/2010 08:33 AM  
 
Lab Results Component Value Date/Time GFR est non-AA >60 07/16/2018 10:35 AM  
 GFR est AA >60 07/16/2018 10:35 AM  
 Creatinine 0.79 07/16/2018 10:35 AM  
 BUN 12 07/16/2018 10:35 AM  
 Sodium 142 07/16/2018 10:35 AM  
 Potassium 3.9 07/16/2018 10:35 AM  
 Chloride 110 (H) 07/16/2018 10:35 AM  
 CO2 24 07/16/2018 10:35 AM  
 
 
 
 
 
Patient Active Problem List  
Diagnosis Code  
 HTN (hypertension) I10  
 Tachyarrhythmia R00.0  Mitral valve prolapse I34.1  Hyperlipidemia E78.5  Obesity (BMI 30.0-34. 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  
 COPD (chronic obstructive pulmonary disease) ([facility]) J44.9 Prior to Admission medications Medication Sig Start Date End Date Taking? Authorizing Provider  
albuterol sulfate 2.5 mg/0.5 mL nebu 2.5 mg, ipratropium 0.02 % soln 0.5 mg 1 Dose by Nebulization route. Yes Provider, Historical  
pneumococcal 13 winston conj dip (PREVNAR-13) 0.5 mL syrg injection 0.5 mL by IntraMUSCular route once for 1 dose.  10/22/18 10/22/18 Yes Dmitry Jackson MD  
guaiFENesin-codeine [facility]) 100-10 mg/5 mL solution Take 5 mL by mouth three (3) times daily as needed for Cough. Max Daily Amount: 15 mL. 10/22/18  Yes Karan Marquez MD  
dilTIAZem CD (CARTIA XT) 120 mg ER capsule TAKE 1 CAPSULE EVERY DAY 10/1/18  Yes Karan Marquez MD  
fluticasone/umeclidin/vilanter (TRELEGY ELLIPTA IN) Take  by inhalation. Yes Provider, Historical  
aspirin delayed-release 81 mg tablet Take  by mouth daily. Yes Provider, Historical  
lamoTRIgine (LAMICTAL) 150 mg tablet Take 1 Tab by mouth two (2) times a day. 9/20/18  Yes Fanta Lyman MD  
simvastatin (ZOCOR) 20 mg tablet TAKE 1 TABLET EVERY NIGHT 7/12/18  Yes Karan Marquez MD  
VENTOLIN HFA 90 mcg/actuation inhaler INHALE 1 PUFF EVERY FOUR HOURS AS NEEDED FOR WHEEZING OR SHORTNESS OF BREATH. 3/22/18   Karan Marquez MD  
meclizine (ANTIVERT) 25 mg tablet Take 1 Tab by mouth three (3) times daily as needed. 1/10/18   Karan Marquez MD  
ibuprofen (MOTRIN) 800 mg tablet Take 1 Tab by mouth every eight (8) hours as needed for Pain. 10/10/17   Karan Marquez MD  
 
 
 
Health Maintenance Topic Date Due  Influenza Age 5 to Adult  08/01/2018  Shingrix Vaccine Age 50> (1 of 2) 03/01/2019 (Originally 11/21/1996)  BREAST CANCER SCRN MAMMOGRAM  11/01/2019 (Originally 3/31/2018)  GLAUCOMA SCREENING Q2Y  09/06/2019  
 FOBT Q 1 YEAR AGE 50-75  10/05/2019  MEDICARE YEARLY EXAM  10/23/2019  
 DTaP/Tdap/Td series (2 - Td) 11/17/2024  Hepatitis C Screening  Completed  Bone Densitometry (Dexa) Screening  Completed  Pneumococcal 65+ Low/Medium Risk  Addressed Review of Systems Constitutional: Negative for chills and fever. Respiratory: Positive for cough. Visit Vitals /80 (BP 1 Location: Right arm, BP Patient Position: Sitting) Pulse 75 Temp 97.3 °F (36.3 °C) (Oral) Resp 19 Ht 5' 1\" (1.549 m) Wt 165 lb 6.4 oz (75 kg) SpO2 97% BMI 31.25 kg/m² Physical Exam  
Constitutional: No distress. Cardiovascular: Normal rate and regular rhythm. Pulmonary/Chest: Effort normal. She has no wheezes. Mild basilar crackles noted ASSESSMENT/PLAN Diagnoses and all orders for this visit: 
 
1. Medicare annual wellness visit, subsequent 2. Encounter for immunization -     pneumococcal 13 winston conj dip (PREVNAR-13) 0.5 mL syrg injection; 0.5 mL by IntraMUSCular route once for 1 dose. 3. Need for vaccination with 13-polyvalent pneumococcal conjugate vaccine 4. Advance care planning 5. Screening for alcoholism 6. Colon cancer screening 
-     REFERRAL TO GASTROENTEROLOGY 7. Screening for depression 8. Cough 
-     guaiFENesin-codeine (ROBITUSSIN AC) 100-10 mg/5 mL solution; Take 5 mL by mouth three (3) times daily as needed for Cough. Max Daily Amount: 15 mL. 9. Bilateral lower extremity edema 
-     2D ECHO COMPLETE ADULT (TTE) W OR WO CONTR; Future After reviewing the patient's history & exam she appears to be at low risk for cardiac complications. However, given her bilateral lower extremity edema as well as mild basilar crackles bilaterally, we will obtain a 2D echo. I advise this be completed her work as well as her workup prior to surgery. No contraindications to planned surgery. Follow-up Disposition: 
Return in about 3 months (around 1/22/2019).

## 2018-10-22 NOTE — PROGRESS NOTES
Kaylie Westfall is a 71 y.o. female and presents for Annual Medicare Wellness Visit. Assessment of cognitive impairment: Alert and oriented x 3. Abuse Screen:   
Abuse Screening Questionnaire 10/22/2018 Do you ever feel afraid of your partner? Collette Manifold Are you in a relationship with someone who physically or mentally threatens you? Collette Manifold Is it safe for you to go home? Nicky Reece Depression Screen: PHQ over the last two weeks 10/22/2018 Little interest or pleasure in doing things Not at all Feeling down, depressed, irritable, or hopeless Not at all Total Score PHQ 2 0 Fall Risk Assessment:   
Fall Risk Assessment, last 12 mths 10/22/2018 Able to walk? Yes Fall in past 12 months? No  
Fall with injury? -  
Number of falls in past 12 months - Fall Risk Score - Activities of Daily Living: ADL Assessment 10/22/2018 Feeding yourself No Help Needed Getting from bed to chair No Help Needed Getting dressed No Help Needed Bathing or showering No Help Needed Walk across the room (includes cane/walker) No Help Needed Using the telphone No Help Needed Taking your medications No Help Needed Preparing meals No Help Needed Managing money (expenses/bills) No Help Needed Moderately strenuous housework (laundry) No Help Needed Shopping for personal items (toiletries/medicines) No Help Needed Shopping for groceries No Help Needed Driving No Help Needed Climbing a flight of stairs Help Needed Getting to places beyond walking distances No Help Needed Health Maintenance: 
Daily Low Dose Aspirin: yes Bone Density: + osteoporosis 2/12/16 discussed adding calcium and D supplement Glaucoma Screening: yes, patient states UTD with Dr. Caridad Copeland, scheduled for cataract surgery on 10/30/18 Immunizations:  
 Tetanus: up to date 11/17/14. Influenza: patient states will obtain at pharmacy. Shingles:   Shingrix: defer for now  Pneumovax:   To obtain 1 year after Prevnar. Prevnar: order placed. Cancer screening:  
 Cervical: NA.  Breast: patient declines. Colon: + FOBT, to discuss with Dr. Belinda Woodall at this visit. Prostate:  NA Advance Care Planning: End of Life Planning: has an advanced directive - a copy has been provided. Provided pt with \"Respecting Choices packet of Information\" no  Offered facilitator session with NN no  
 
Medications/Allergies: Reviewed with patient Prior to Admission medications Medication Sig Start Date End Date Taking? Authorizing Provider  
albuterol sulfate 2.5 mg/0.5 mL nebu 2.5 mg, ipratropium 0.02 % soln 0.5 mg 1 Dose by Nebulization route. Yes Provider, Historical  
dilTIAZem CD (CARTIA XT) 120 mg ER capsule TAKE 1 CAPSULE EVERY DAY 10/1/18  Yes Leopoldo Horde, MD  
fluticasone/umeclidin/vilanter (TRELEGY ELLIPTA IN) Take  by inhalation. Yes Provider, Historical  
aspirin delayed-release 81 mg tablet Take  by mouth daily. Yes Provider, Historical  
lamoTRIgine (LAMICTAL) 150 mg tablet Take 1 Tab by mouth two (2) times a day. 18  Yes Feliberto Monte MD  
simvastatin (ZOCOR) 20 mg tablet TAKE 1 TABLET EVERY NIGHT 18  Yes Leopoldo Horde, MD  
VENTOLIN HFA 90 mcg/actuation inhaler INHALE 1 PUFF EVERY FOUR HOURS AS NEEDED FOR WHEEZING OR SHORTNESS OF BREATH. 3/22/18   Leopoldo Horde, MD  
meclizine (ANTIVERT) 25 mg tablet Take 1 Tab by mouth three (3) times daily as needed. 1/10/18   Leopoldo Horde, MD  
ibuprofen (MOTRIN) 800 mg tablet Take 1 Tab by mouth every eight (8) hours as needed for Pain. 10/10/17   Leopoldo Horde, MD  
 
Allergies Allergen Reactions  Prednisone Other (comments) Shakiness and headache PSH: Reviewed with patient Past Surgical History:  
Procedure Laterality Date  HX BREAST LUMPECTOMY  HX  SECTION    
 HX ORTHOPAEDIC    
 trigger finger  HX WISDOM TEETH EXTRACTION    
  
 
SH: Reviewed with patient Social History Tobacco Use  
  Smoking status: Current Every Day Smoker Packs/day: 0.50 Years: 50.00 Pack years: 25.00 Types: Cigarettes  Smokeless tobacco: Never Used Substance Use Topics  Alcohol use: No  
 Drug use: No  
 
 
FH: Reviewed with patient Family History Problem Relation Age of Onset  Heart Disease Father  Stroke Father  Cancer Paternal Aunt   
     breast  
 Cancer Maternal Grandmother  Cancer Paternal Grandmother   
     colon  Cancer Son   
     colon  Cancer Paternal Aunt   
     breast  
 
 
Objective: 
Visit Vitals /80 (BP 1 Location: Right arm, BP Patient Position: Sitting) Pulse 75 Temp 97.3 °F (36.3 °C) (Oral) Resp 19 Ht 5' 1\" (1.549 m) Wt 165 lb 6.4 oz (75 kg) SpO2 97% BMI 31.25 kg/m² Body mass index is 31.25 kg/m². Alcohol Risk Screen: On any occasion during past 3 months, have you had more than 3 drinks (female) or 4 drinks (male) containing alcohol? No 
Do you average more than 7 drinks (female) or 14 drinks (male) per week? No 
Type and Amount: none Tobacco Abuse: 
Yes 1/2 ppd, counseled on danger of tobacco use, contributing to her chronic cough (COPD) and osteoporosis, as well as other health dangers. Nutrition Screen: 
eats a balanced diet Hearing Loss: 
Mild loss Vision Loss:  
Wears glasses, contact lenses, or have any other visual impairment  Prescription glasses Activities of Daily Living: 
Self-care. Requires assistance with: no ADLs Patient handle his/her own medications  yes Use of pill box  no Current medical providers:   
Patient Care Team: 
Vicki Christy MD as PCP - General (Internal Medicine) Varinder Murguia MD (Neurology) Shivani Wells RN as Ambulatory Care Navigator (Internal Medicine) Plan:   
 
Orders Placed This Encounter  albuterol sulfate 2.5 mg/0.5 mL nebu 2.5 mg, ipratropium 0.02 % soln 0.5 mg Health Maintenance Topic Date Due  
  Shingrix Vaccine Age 50> (1 of 2) 11/21/1996  BREAST CANCER SCRN MAMMOGRAM  03/31/2018  Influenza Age 5 to Adult  08/01/2018  MEDICARE YEARLY EXAM  10/11/2018  GLAUCOMA SCREENING Q2Y  09/06/2019  
 FOBT Q 1 YEAR AGE 50-75  10/05/2019  
 DTaP/Tdap/Td series (2 - Td) 11/17/2024  Hepatitis C Screening  Completed  Bone Densitometry (Dexa) Screening  Completed  Pneumococcal 65+ Low/Medium Risk  Addressed *Patient verbalized understanding and agreement with the plan. A copy of the After Visit Summary with personalized health plan was given to the patient today. Physical Exam will be performed by PCP and documented under a separate Progress Note.

## 2018-10-22 NOTE — PATIENT INSTRUCTIONS
Today you had a Medicare Wellness Visit. During this visit, we developed and/or updated your personalized health plan to prevent disease and disability based on your current health and risk factors. Please schedule an appt around this time next year so we can continue to keep you on the right path to living a healthy lifestyle. Schedule of Personalized Health Plan The best way to stay healthy is to live a healthy lifestyle. A healthy lifestyle includes regular exercise, eating a well-balanced diet, keeping a healthy weight and not smoking. Regular physical exams and screening tests are another important way to take care of yourself. Preventive exams provided by health care providers can find health problems early when treatment works best and can keep you from getting certain diseases or illnesses. Preventive services include exams, lab tests, screenings, shots, monitoring and information to help you take care of your own health. All people over 65 should have a pneumonia shot. Pneumonia shots are usually only needed once in a lifetime unless your doctor decides differently. All people over 65 should have a yearly flu shot. People over 65 are at medium to high risk for Hepatitis B. Three shots are needed for complete protection. For additional information, please discuss with physician. In addition to your physical exam, some screening tests are recommended: 
 
Bone mass measurement (dexa scan) is recommended every  two years. Diabetes Mellitus screening is recommended every year. Glaucoma is an eye disease caused by high pressure in the eye. An eye exam is recommended every year. Cardiovascular screening tests that check your cholesterol and other blood fat (lipid) levels are recommended every five years.   
 
Colorectal Cancer screening tests help to find pre-cancerous polyps (growths in the colon) so they can be removed before they turn into cancer. Tests ordered for screening depend on your personal and family history risk factors. Mammogram screening for Breast Cancer is recommended yearly. Screening for Cervical Cancer is recommended every two years (annually for certain risk factors, such as previous history of STD or abnormal PAP in past 7 years). Here is a list of your current Health Maintenance items with a due date: 
Health Maintenance Topic Date Due  Shingrix Vaccine Age 50> (1 of 2) 11/21/1996  BREAST CANCER SCRN MAMMOGRAM  03/31/2018  Influenza Age 5 to Adult  08/01/2018  MEDICARE YEARLY EXAM  10/11/2018  GLAUCOMA SCREENING Q2Y  09/06/2019  
 FOBT Q 1 YEAR AGE 50-75  10/05/2019  
 DTaP/Tdap/Td series (2 - Td) 11/17/2024  Hepatitis C Screening  Completed  Bone Densitometry (Dexa) Screening  Completed  Pneumococcal 65+ Low/Medium Risk  Addressed

## 2018-10-23 ENCOUNTER — TELEPHONE (OUTPATIENT)
Dept: INTERNAL MEDICINE CLINIC | Age: 73
End: 2018-10-23

## 2018-10-23 NOTE — TELEPHONE ENCOUNTER
Clarified with pt; scheduling will call her to schedule her Echo and office notes was faxed to surgeon.

## 2018-10-23 NOTE — TELEPHONE ENCOUNTER
Pt made aware of Cardiology's information; last office notes faxed over. Pt verbalized understanding.

## 2018-10-25 LAB
ALBUMIN SERPL-MCNC: 4.2 G/DL (ref 3.5–4.8)
ALBUMIN/GLOB SERPL: 2.1 {RATIO} (ref 1.2–2.2)
ALP SERPL-CCNC: 79 IU/L (ref 39–117)
ALT SERPL-CCNC: 16 IU/L (ref 0–32)
AST SERPL-CCNC: 14 IU/L (ref 0–40)
BASOPHILS # BLD AUTO: 0 X10E3/UL (ref 0–0.2)
BASOPHILS NFR BLD AUTO: 0 %
BILIRUB SERPL-MCNC: 0.5 MG/DL (ref 0–1.2)
BUN SERPL-MCNC: 16 MG/DL (ref 8–27)
BUN/CREAT SERPL: 19 (ref 12–28)
CALCIUM SERPL-MCNC: 9.5 MG/DL (ref 8.7–10.3)
CHLORIDE SERPL-SCNC: 104 MMOL/L (ref 96–106)
CHOLEST SERPL-MCNC: 181 MG/DL (ref 100–199)
CO2 SERPL-SCNC: 27 MMOL/L (ref 20–29)
CREAT SERPL-MCNC: 0.83 MG/DL (ref 0.57–1)
EOSINOPHIL # BLD AUTO: 0.2 X10E3/UL (ref 0–0.4)
EOSINOPHIL NFR BLD AUTO: 3 %
ERYTHROCYTE [DISTWIDTH] IN BLOOD BY AUTOMATED COUNT: 14.5 % (ref 12.3–15.4)
GLOBULIN SER CALC-MCNC: 2 G/DL (ref 1.5–4.5)
GLUCOSE SERPL-MCNC: 99 MG/DL (ref 65–99)
HCT VFR BLD AUTO: 40.2 % (ref 34–46.6)
HDLC SERPL-MCNC: 75 MG/DL
HGB BLD-MCNC: 13.2 G/DL (ref 11.1–15.9)
IMM GRANULOCYTES # BLD: 0 X10E3/UL (ref 0–0.1)
IMM GRANULOCYTES NFR BLD: 0 %
LDLC SERPL CALC-MCNC: 91 MG/DL (ref 0–99)
LYMPHOCYTES # BLD AUTO: 1.2 X10E3/UL (ref 0.7–3.1)
LYMPHOCYTES NFR BLD AUTO: 16 %
MCH RBC QN AUTO: 30.4 PG (ref 26.6–33)
MCHC RBC AUTO-ENTMCNC: 32.8 G/DL (ref 31.5–35.7)
MCV RBC AUTO: 93 FL (ref 79–97)
MONOCYTES # BLD AUTO: 0.8 X10E3/UL (ref 0.1–0.9)
MONOCYTES NFR BLD AUTO: 12 %
NEUTROPHILS # BLD AUTO: 4.8 X10E3/UL (ref 1.4–7)
NEUTROPHILS NFR BLD AUTO: 69 %
PLATELET # BLD AUTO: 303 X10E3/UL (ref 150–379)
POTASSIUM SERPL-SCNC: 4.1 MMOL/L (ref 3.5–5.2)
PROT SERPL-MCNC: 6.2 G/DL (ref 6–8.5)
RBC # BLD AUTO: 4.34 X10E6/UL (ref 3.77–5.28)
SODIUM SERPL-SCNC: 140 MMOL/L (ref 134–144)
TRIGL SERPL-MCNC: 73 MG/DL (ref 0–149)
VLDLC SERPL CALC-MCNC: 15 MG/DL (ref 5–40)
WBC # BLD AUTO: 7.1 X10E3/UL (ref 3.4–10.8)

## 2018-10-29 ENCOUNTER — HOSPITAL ENCOUNTER (OUTPATIENT)
Dept: NON INVASIVE DIAGNOSTICS | Age: 73
Discharge: HOME | End: 2018-10-29
Attending: INTERNAL MEDICINE
Payer: MEDICARE

## 2018-10-29 DIAGNOSIS — R60.0 BILATERAL LOWER EXTREMITY EDEMA: ICD-10-CM

## 2018-10-29 PROCEDURE — 93306 TTE W/DOPPLER COMPLETE: CPT

## 2018-10-30 ENCOUNTER — DOCUMENTATION ONLY (OUTPATIENT)
Dept: INTERNAL MEDICINE CLINIC | Age: 73
End: 2018-10-30

## 2018-10-30 ENCOUNTER — TELEPHONE (OUTPATIENT)
Dept: INTERNAL MEDICINE CLINIC | Age: 73
End: 2018-10-30

## 2018-10-30 NOTE — PROGRESS NOTES
Letter sent to the patient's ophthalmologist.  Echo shows no indications or cause to prevent her to undergo cataract surgery.

## 2018-10-30 NOTE — PROGRESS NOTES
Faxed letter of clearance for surgery to [facility] with [facility] @ [phone], confirmation received.

## 2018-10-30 NOTE — TELEPHONE ENCOUNTER
Marian Poe from [facility] called to inform patient in office for surgery and need clearance echo has been reviewed. Spoke with Dr Dennis Kaur, letter faxed.

## 2018-11-07 ENCOUNTER — OFFICE VISIT (OUTPATIENT)
Dept: INTERNAL MEDICINE CLINIC | Age: 73
End: 2018-11-07

## 2018-11-07 ENCOUNTER — TELEPHONE (OUTPATIENT)
Dept: INTERNAL MEDICINE CLINIC | Age: 73
End: 2018-11-07

## 2018-11-07 ENCOUNTER — HOSPITAL ENCOUNTER (OUTPATIENT)
Dept: GENERAL RADIOLOGY | Age: 73
Discharge: HOME | End: 2018-11-07
Payer: MEDICARE

## 2018-11-07 VITALS
DIASTOLIC BLOOD PRESSURE: 70 MMHG | WEIGHT: 161.6 LBS | SYSTOLIC BLOOD PRESSURE: 118 MMHG | HEART RATE: 78 BPM | OXYGEN SATURATION: 95 % | BODY MASS INDEX: 30.51 KG/M2 | HEIGHT: 61 IN | RESPIRATION RATE: 21 BRPM | TEMPERATURE: 97.4 F

## 2018-11-07 DIAGNOSIS — J44.1 COPD EXACERBATION (HCC): Primary | ICD-10-CM

## 2018-11-07 DIAGNOSIS — J44.1 COPD EXACERBATION (HCC): ICD-10-CM

## 2018-11-07 PROCEDURE — 71046 X-RAY EXAM CHEST 2 VIEWS: CPT

## 2018-11-07 RX ORDER — CIPROFLOXACIN 500 MG/1
500 TABLET ORAL 2 TIMES DAILY
Qty: 14 TAB | Refills: 0 | Status: SHIPPED | OUTPATIENT
Start: 2018-11-07 | End: 2018-11-14

## 2018-11-07 RX ORDER — PREDNISONE 10 MG/1
10 TABLET ORAL SEE ADMIN INSTRUCTIONS
Qty: 21 TAB | Refills: 0 | Status: SHIPPED | OUTPATIENT
Start: 2018-11-07 | End: 2018-12-13 | Stop reason: ALTCHOICE

## 2018-11-07 NOTE — TELEPHONE ENCOUNTER
Dr Posey [place] called to report the pt has pneumonia.  No need to return call she is sending a report just [facility]

## 2018-11-08 ENCOUNTER — TELEPHONE (OUTPATIENT)
Dept: INTERNAL MEDICINE CLINIC | Age: 73
End: 2018-11-08

## 2018-11-08 NOTE — TELEPHONE ENCOUNTER
Pt had surgery 10 days ago. Pt seen yesterday and put on Prednisone miko-  Pt wants to know if can still use esteriod eye drops    2) Pt told to take Musinex,  this not working well at night. Can Pt take her Robitussin with ruth ann ( told her not too). Advise what she can take if not.     Daughter, Rc Shepard [phone]

## 2018-11-08 NOTE — TELEPHONE ENCOUNTER
Pt's daughter wants to know since Pt. Started prednisone taper this morning does she still use prednisone eye drops from cataract surgery and can she take tussionex with mucinex at night to suppress her cough?  Respond to the pool, thx

## 2018-11-09 NOTE — TELEPHONE ENCOUNTER
Informed patient xray show early sign of pneumonia. Continue medications, rest and stay well hydrated. Do not take Robitussin and Mucinex together. Use Robitusson at night. ER if sx worsen. Pt verbalized understanding.

## 2018-11-29 ENCOUNTER — OFFICE VISIT (OUTPATIENT)
Dept: NEUROLOGY | Age: 73
End: 2018-11-29

## 2018-11-29 VITALS
DIASTOLIC BLOOD PRESSURE: 80 MMHG | HEIGHT: 61 IN | SYSTOLIC BLOOD PRESSURE: 140 MMHG | HEART RATE: 76 BPM | OXYGEN SATURATION: 98 % | WEIGHT: 162 LBS | RESPIRATION RATE: 14 BRPM | BODY MASS INDEX: 30.58 KG/M2

## 2018-11-29 DIAGNOSIS — G40.209 CRYPTOGENIC PARTIAL COMPLEX EPILEPSY (HCC): Primary | ICD-10-CM

## 2018-11-29 DIAGNOSIS — G44.85 IDIOPATHIC STABBING HEADACHE: ICD-10-CM

## 2018-11-29 DIAGNOSIS — G25.0 ESSENTIAL TREMOR: ICD-10-CM

## 2018-11-29 NOTE — PROGRESS NOTES
Chief Complaint Patient presents with  Seizure  Headache HISTORY OF PRESENT ILLNESS Maria Teresa Flores came back for follow up. She was last seen by me in September 2018. She has not had any further seizures since then and is currently taking lamotrigine 150 mg twice a day. Her last level was therapeutic. Right-sided temporal headache is also better. She rarely gets a sharp, stabbing type pain that lasts a few seconds and then goes away. Lamotrigine seems to have helped with it. She had cataract removed in the right eye and is planning to get the other side done next month. She had an EEG for evaluation of spells where she had alteration of awareness and could not respond to people. EEG showed epileptiform activity intermittently out of the left temporal head region. She was also having occasional weakness in the right upper and lower extremity. She has never had any witnessed generalized convulsion. 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 This was stable on her last scan. The tremor in her hands has improved. Current Outpatient Medications Medication Sig  predniSONE (STERAPRED DS) 10 mg dose pack Take 1 Tab by mouth See Admin Instructions. See administration instruction per 10mg dose pack  albuterol sulfate 2.5 mg/0.5 mL nebu 2.5 mg, ipratropium 0.02 % soln 0.5 mg 1 Dose by Nebulization route.  guaiFENesin-codeine (ROBITUSSIN AC) 100-10 mg/5 mL solution Take 5 mL by mouth three (3) times daily as needed for Cough. Max Daily Amount: 15 mL.  dilTIAZem CD (CARTIA XT) 120 mg ER capsule TAKE 1 CAPSULE EVERY DAY  fluticasone/umeclidin/vilanter (TRELEGY ELLIPTA IN) Take  by inhalation.  aspirin delayed-release 81 mg tablet Take  by mouth daily.  lamoTRIgine (LAMICTAL) 150 mg tablet Take 1 Tab by mouth two (2) times a day.  simvastatin (ZOCOR) 20 mg tablet TAKE 1 TABLET EVERY NIGHT  VENTOLIN HFA 90 mcg/actuation inhaler INHALE 1 PUFF EVERY FOUR HOURS AS NEEDED FOR WHEEZING OR SHORTNESS OF BREATH.  meclizine (ANTIVERT) 25 mg tablet Take 1 Tab by mouth three (3) times daily as needed.  ibuprofen (MOTRIN) 800 mg tablet Take 1 Tab by mouth every eight (8) hours as needed for Pain. No current facility-administered medications for this visit. PHYSICAL EXAMINATION:   
 
 
NEUROLOGICAL EXAMINATION:    
Mental Status:   Alert and oriented to person, place, and time. Attention span and concentration are normal. Speech is fluent. Cranial Nerves:   
II, III, IV, VI:  Visual acuity grossly intact. Visual fields are normal.   
Pupils are equal, round, and reactive to light and accommodation. Extra-ocular movements are full and fluid. V-XII: Hearing is grossly intact. Facial features are symmetric, with normal sensation and strength. The palate rises symmetrically and the tongue protrudes midline. Motor Examination: Slight weakness of the  strength on the right and slight weakness of the great toe extension on the right. Normal tone, bulk, and strength on the left side. No cogwheel rigidity or clonus present. Sensory exam:  Normal throughout. Coordination:  Finger to nose and rapid arm movement testing was normal.  No tremor seen in the hands today. Gait and Station:  Steady. Normal arm swing. No Rhomberg or pronator drift. No muscle wasting or fasiculations noted. Reflexes:  DTRs 2+ throughout. Toes downgoing. Marycruz Brian / IMAGING Lab Results Component Value Date/Time  Sodium 140 10/24/2018 07:19 AM  
 Potassium 4.1 10/24/2018 07:19 AM  
 Chloride 104 10/24/2018 07:19 AM  
 CO2 27 10/24/2018 07:19 AM  
 Anion gap 8 07/16/2018 10:35 AM  
 Glucose 99 10/24/2018 07:19 AM  
 BUN 16 10/24/2018 07:19 AM  
 Creatinine 0.83 10/24/2018 07:19 AM  
 BUN/Creatinine ratio 19 10/24/2018 07:19 AM  
 GFR est AA 82 10/24/2018 07:19 AM  
 GFR est non-AA 71 10/24/2018 07:19 AM  
 Calcium 9.5 10/24/2018 07:19 AM  
 Bilirubin, total 0.5 10/24/2018 07:19 AM  
 AST (SGOT) 14 10/24/2018 07:19 AM  
 Alk. phosphatase 79 10/24/2018 07:19 AM  
 Protein, total 6.2 10/24/2018 07:19 AM  
 Albumin 4.2 10/24/2018 07:19 AM  
 Globulin 3.6 02/01/2018 04:03 PM  
 A-G Ratio 2.1 10/24/2018 07:19 AM  
 ALT (SGPT) 16 10/24/2018 07:19 AM  
 
Lab Results Component Value Date/Time WBC 7.1 10/24/2018 07:19 AM  
 HGB 13.2 10/24/2018 07:19 AM  
 HCT 40.2 10/24/2018 07:19 AM  
 PLATELET 214 36/39/6936 07:19 AM  
 MCV 93 10/24/2018 07:19 AM  
 
Last Lamotrigine level was 4.3 ASSESSMENT 
  ICD-10-CM ICD-9-CM 1. Cryptogenic partial complex epilepsy ([facility] [address] G40.219 345.50 2. Essential tremor G25.0 333.1 3. 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 Sammi Beard has had episodes in the past where she could not respond to move. Complex partial seizures are suspected given abnormal EEG. She is doing well from seizure standpoint and should continue lamotrigine 150 mg twice a day. Some of these episodes in the past were related to stress and there has been a concern that these may be nonepileptic. We may consider EMU in the future if the spells return. She has been having retro-orbital pain in the right side and has some temporal tenderness. Suspect that this may be an idiopathic primary headache such as stabbing headache. 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 Scans have been stable over the past 2-3 years. She has benign essential tremor involving her arms/hands. It sometimes affects her head as well. This does not seem to be bothering her and wishes to defer any other pharmacologic therapy. Follow-up in 6 months Anibal Swan MD 
Diplomate, American Board of Psychiatry & Neurology (Neurology) [facility] Board of Psychiatry & Neurology (Clinical Neurophysiology) Diplomate, [address] [facility] Board of Electrodiagnostic Medicine This note will not be viewable in [address]

## 2018-12-13 ENCOUNTER — OFFICE VISIT (OUTPATIENT)
Dept: INTERNAL MEDICINE CLINIC | Age: 73
End: 2018-12-13

## 2018-12-13 VITALS
SYSTOLIC BLOOD PRESSURE: 138 MMHG | WEIGHT: 166.2 LBS | DIASTOLIC BLOOD PRESSURE: 80 MMHG | TEMPERATURE: 97.9 F | BODY MASS INDEX: 31.38 KG/M2 | HEIGHT: 61 IN | RESPIRATION RATE: 17 BRPM | HEART RATE: 81 BPM | OXYGEN SATURATION: 96 %

## 2018-12-13 DIAGNOSIS — Z01.818 PREOP EXAM FOR INTERNAL MEDICINE: Primary | ICD-10-CM

## 2018-12-13 DIAGNOSIS — J44.9 CHRONIC OBSTRUCTIVE PULMONARY DISEASE, UNSPECIFIED COPD TYPE (HCC): ICD-10-CM

## 2018-12-13 NOTE — PROGRESS NOTES
Preoperative Asessment    Balaji Pina is 68 y.o. female (1945) who presents for preoperative evaluation. Procedure/Surgery: Cataract surgery   Date of Procedure/Surgery: 12/18/18   Surgeon: Dr. Radha Stevenson  Primary Physician: Alisha Kinsey MD      The patient reports feeling well today. She is coughing less and breathing better. She has seen and followed up with her pulmonologist for COPD. She has been given a new inhaler which seems to be working very well. Additionally, she has followed up recently with neurology. There have been no changes to her plan of care. She remains on Lamictal.      Patient Active Problem List   Diagnosis Code    HTN (hypertension) I10    Tachyarrhythmia R00.0    Mitral valve prolapse I34.1    Hyperlipidemia E78.5    Obesity (BMI 30.0-34. 9) E66.9    Cyst of brain G93.0    Cryptogenic partial complex epilepsy ([facility] [address] G40.219    Essential tremor G25.0    COPD (chronic obstructive pulmonary disease) ([facility]) J44.9       Prior to Admission medications    Medication Sig Start Date End Date Taking? Authorizing Provider   albuterol sulfate 2.5 mg/0.5 mL nebu 2.5 mg, ipratropium 0.02 % soln 0.5 mg 1 Dose by Nebulization route. Yes Provider, Historical   dilTIAZem CD (CARTIA XT) 120 mg ER capsule TAKE 1 CAPSULE EVERY DAY 10/1/18  Yes Fabian Spurling, MD   fluticasone/umeclidin/vilanter (TRELEGY ELLIPTA IN) Take  by inhalation. Yes Provider, Historical   aspirin delayed-release 81 mg tablet Take  by mouth daily. Yes Provider, Historical   lamoTRIgine (LAMICTAL) 150 mg tablet Take 1 Tab by mouth two (2) times a day.  9/20/18  Yes Gregoria Kelly MD   simvastatin (ZOCOR) 20 mg tablet TAKE 1 TABLET EVERY NIGHT 7/12/18  Yes Fabian Spurling, MD   VENTOLIN HFA 90 mcg/actuation inhaler INHALE 1 PUFF EVERY FOUR HOURS AS NEEDED FOR WHEEZING OR SHORTNESS OF BREATH. 3/22/18  Yes Fabian Spurling, MD   meclizine (ANTIVERT) 25 mg tablet Take 1 Tab by mouth three (3) times daily as needed. 1/10/18  Yes Wade Rosado MD   ibuprofen (MOTRIN) 800 mg tablet Take 1 Tab by mouth every eight (8) hours as needed for Pain. 10/10/17  Yes Wade Rosado MD   guaiFENesin-codeine [facility]) 100-10 mg/5 mL solution Take 5 mL by mouth three (3) times daily as needed for Cough. Max Daily Amount: 15 mL. 10/22/18   Wade Rosado MD       Review of Systems   Constitutional: Negative. Respiratory: Negative. Cardiovascular: Negative. Latex Allergy: none  Recent use of: No recent use of aspirin (ASA), NSAIDS or steroids  Tetanus up to date: last tetanus booster within 10 years  Anesthesia Complications: None  History of abnormal bleeding : None  History of Blood Transfusions: no  Health Care Directive or Living Will: yes    Visit Vitals  /80 (BP 1 Location: Right arm, BP Patient Position: Sitting)   Pulse 81   Temp 97.9 °F (36.6 °C) (Oral)   Resp 17   Ht 5' 1\" (1.549 m)   Wt 166 lb 3.2 oz (75.4 kg)   SpO2 96%   BMI 31.40 kg/m²         Physical Exam   Constitutional: No distress. Cardiovascular: Normal rate and regular rhythm. No murmur heard. Pulmonary/Chest: Effort normal and breath sounds normal.         Labs:   Lab Results   Component Value Date/Time    WBC 7.1 10/24/2018 07:19 AM    HGB 13.2 10/24/2018 07:19 AM    HCT 40.2 10/24/2018 07:19 AM    PLATELET 022 41/32/1395 07:19 AM    MCV 93 10/24/2018 07:19 AM     Lab Results   Component Value Date/Time    GFR est non-AA 71 10/24/2018 07:19 AM    GFR est AA 82 10/24/2018 07:19 AM    Creatinine 0.83 10/24/2018 07:19 AM    BUN 16 10/24/2018 07:19 AM    Sodium 140 10/24/2018 07:19 AM    Potassium 4.1 10/24/2018 07:19 AM    Chloride 104 10/24/2018 07:19 AM    CO2 27 10/24/2018 07:19 AM           Diagnoses and all orders for this visit:    1. Preop exam for internal medicine    2.  Chronic obstructive pulmonary disease, unspecified COPD type ([facility] [address]        After reviewing the patient's history & exam she is low risk for cardiac complications.   No contraindications to planned surgery

## 2018-12-17 NOTE — PROGRESS NOTES
Acute Care Note    Juanjose June is 68 y.o. female. she presents for evaluation of URI and Shortness of Breath      COUGH  Binh Whitehead is here to talk about cough and shortness of breath. This is a chronic problem. Symptoms began several days ago, gradually worsening since that time. The cough is non-productive, with wheezing and is aggravated by nothing. Associated symptoms include:none. She denies: fever, chills. She has tried cough suppressants, and has been not very effective. There is a PMH significant for: COPD      Prior to Admission medications    Medication Sig Start Date End Date Taking? Authorizing Provider   albuterol sulfate 2.5 mg/0.5 mL nebu 2.5 mg, ipratropium 0.02 % soln 0.5 mg 1 Dose by Nebulization route. Yes Provider, Historical   guaiFENesin-codeine (ROBITUSSIN AC) 100-10 mg/5 mL solution Take 5 mL by mouth three (3) times daily as needed for Cough. Max Daily Amount: 15 mL. 10/22/18  Yes Dmitry Jackson MD   dilTIAZem CD (CARTIA XT) 120 mg ER capsule TAKE 1 CAPSULE EVERY DAY 10/1/18  Yes Dmitry Jackson MD   fluticasone/umeclidin/vilanter (TRELEGY ELLIPTA IN) Take  by inhalation. Yes Provider, Historical   aspirin delayed-release 81 mg tablet Take  by mouth daily. Yes Provider, Historical   lamoTRIgine (LAMICTAL) 150 mg tablet Take 1 Tab by mouth two (2) times a day. 9/20/18  Yes Bhavna Tyler MD   simvastatin (ZOCOR) 20 mg tablet TAKE 1 TABLET EVERY NIGHT 7/12/18  Yes Dmitry Jackson MD   VENTOLIN HFA 90 mcg/actuation inhaler INHALE 1 PUFF EVERY FOUR HOURS AS NEEDED FOR WHEEZING OR SHORTNESS OF BREATH. 3/22/18  Yes Dmitry Jackson MD   meclizine (ANTIVERT) 25 mg tablet Take 1 Tab by mouth three (3) times daily as needed. 1/10/18  Yes Dmitry Jackson MD   ibuprofen (MOTRIN) 800 mg tablet Take 1 Tab by mouth every eight (8) hours as needed for Pain.  10/10/17  Yes Dmitry Jackson MD         Patient Active Problem List   Diagnosis Code    HTN (hypertension) I10    Tachyarrhythmia R00.0    Mitral valve prolapse I34.1    Hyperlipidemia E78.5    Obesity (BMI 30.0-34. 9) E66.9    Cyst of brain G93.0    Cryptogenic partial complex epilepsy ([facility] [address] G40.219    Essential tremor G25.0    COPD (chronic obstructive pulmonary disease) ([facility]) J44.9         Review of Systems   Constitutional: Negative. Respiratory: Negative. Cardiovascular: Negative. Gastrointestinal: Negative. Visit Vitals  /70 (BP 1 Location: Right arm, BP Patient Position: Sitting)   Pulse 78   Temp 97.4 °F (36.3 °C) (Oral)   Resp 21   Ht 5' 1\" (1.549 m)   Wt 161 lb 9.6 oz (73.3 kg)   SpO2 95%   BMI 30.53 kg/m²       Physical Exam   Constitutional: No distress. HENT:   Mouth/Throat: Oropharynx is clear and moist.   Cardiovascular: Normal rate and regular rhythm. Pulmonary/Chest: She has wheezes. She has rales. ASSESSMENT/PLAN  Diagnoses and all orders for this visit:    1. COPD exacerbation ([facility])  -     ciprofloxacin HCl (CIPRO) 500 mg tablet; Take 1 Tab by mouth two (2) times a day for 7 days. -     XR CHEST PA LAT; Future         Advised the patient to call back or return to office if symptoms worsen/change/persist.   Discussed expected course/resolution/complications of diagnosis in detail with patient. Medication risks/benefits/costs/interactions/alternatives discussed with patient. The patient was given an after visit summary which includes diagnoses, current medications, & vitals. They expressed understanding with the diagnosis and plan.

## 2018-12-18 ENCOUNTER — DOCUMENTATION ONLY (OUTPATIENT)
Dept: INTERNAL MEDICINE CLINIC | Age: 73
End: 2018-12-18

## 2018-12-18 NOTE — PROGRESS NOTES
Faxed pre-op assessment dated 12/13/2018 to [facility] @ [phone], confirmation received.

## 2019-01-04 DIAGNOSIS — E78.2 MIXED HYPERLIPIDEMIA: ICD-10-CM

## 2019-01-04 RX ORDER — SIMVASTATIN 20 MG/1
TABLET, FILM COATED ORAL
Qty: 90 TAB | Refills: 1 | Status: SHIPPED | OUTPATIENT
Start: 2019-01-04 | End: 2019-07-25 | Stop reason: SDUPTHER

## 2019-01-22 ENCOUNTER — OFFICE VISIT (OUTPATIENT)
Dept: INTERNAL MEDICINE CLINIC | Age: 74
End: 2019-01-22

## 2019-01-22 VITALS
SYSTOLIC BLOOD PRESSURE: 100 MMHG | OXYGEN SATURATION: 98 % | TEMPERATURE: 98.7 F | HEIGHT: 61 IN | BODY MASS INDEX: 30.78 KG/M2 | RESPIRATION RATE: 17 BRPM | WEIGHT: 163 LBS | DIASTOLIC BLOOD PRESSURE: 70 MMHG | HEART RATE: 63 BPM

## 2019-01-22 DIAGNOSIS — E78.2 MIXED HYPERLIPIDEMIA: ICD-10-CM

## 2019-01-22 DIAGNOSIS — G25.0 ESSENTIAL TREMOR: ICD-10-CM

## 2019-01-22 DIAGNOSIS — J44.9 CHRONIC OBSTRUCTIVE PULMONARY DISEASE, UNSPECIFIED COPD TYPE (HCC): Primary | ICD-10-CM

## 2019-01-22 NOTE — PROGRESS NOTES
Follow Up Visit    Krishan Gonzalez is a 68 y.o. female. she presents for COPD    Pulmonary Review  The patient is being seen for COPD. Symptoms occur: infrequently. She has recently been started on Trelegy by her pulmonologist.  She has had a significant improvement in her respiratory symptoms on this medication. Current limitations in activity: Mild. Coughing when present is described as mild-moderate. Regimen compliance: The patient reports adherence to this regimen. Patient does smoke cigarettes. She has decreased her cigarette intake to 10 cigarettes a day. Sometimes she smokes less than that. Patient Active Problem List   Diagnosis Code    HTN (hypertension) I10    Tachyarrhythmia R00.0    Mitral valve prolapse I34.1    Hyperlipidemia E78.5    Obesity (BMI 30.0-34. 9) E66.9    Cyst of brain G93.0    Cryptogenic partial complex epilepsy ([facility] [address] G40.219    Essential tremor G25.0    COPD (chronic obstructive pulmonary disease) ([facility]) J44.9         Prior to Admission medications    Medication Sig Start Date End Date Taking? Authorizing Provider   simvastatin (ZOCOR) 20 mg tablet TAKE 1 TABLET EVERY NIGHT 1/4/19  Yes Lexx Goodman MD   albuterol sulfate 2.5 mg/0.5 mL nebu 2.5 mg, ipratropium 0.02 % soln 0.5 mg 1 Dose by Nebulization route. Yes Provider, Historical   dilTIAZem CD (CARTIA XT) 120 mg ER capsule TAKE 1 CAPSULE EVERY DAY 10/1/18  Yes Lexx Goodman MD   fluticasone/umeclidin/vilanter (TRELEGY ELLIPTA IN) Take  by inhalation. Yes Provider, Historical   aspirin delayed-release 81 mg tablet Take  by mouth daily. Yes Provider, Historical   lamoTRIgine (LAMICTAL) 150 mg tablet Take 1 Tab by mouth two (2) times a day.  9/20/18  Yes Pura Cruz MD   VENTOLIN HFA 90 mcg/actuation inhaler INHALE 1 PUFF EVERY FOUR HOURS AS NEEDED FOR WHEEZING OR SHORTNESS OF BREATH. 3/22/18  Yes Lexx Goodman MD   meclizine (ANTIVERT) 25 mg tablet Take 1 Tab by mouth three (3) times daily as needed. 1/10/18  Yes Rosario Larry MD   ibuprofen (MOTRIN) 800 mg tablet Take 1 Tab by mouth every eight (8) hours as needed for Pain. 10/10/17  Yes Rosario Larry MD         Health Maintenance   Topic Date Due    Shingrix Vaccine Age 50> (1 of 2) 03/01/2019 (Originally 11/21/1996)   [facility] MAMMOGRAM  11/01/2019 (Originally 3/31/2018)   [address]  09/06/2019    FOBT Q 1 YEAR AGE 50-75  10/05/2019    MEDICARE YEARLY EXAM  10/23/2019    DTaP/Tdap/Td series (2 - Td) 11/17/2024    Hepatitis C Screening  Completed    Bone Densitometry (Dexa) Screening  Completed    Influenza Age 5 to Adult  Completed    Pneumococcal 65+ Low/Medium Risk  Addressed       Review of Systems   Constitutional: Negative. Respiratory: Negative. Cardiovascular: Negative. Visit Vitals  /70 (BP 1 Location: Right arm, BP Patient Position: Sitting)   Pulse 63   Temp 98.7 °F (37.1 °C) (Oral)   Resp 17   Ht 5' 1\" (1.549 m)   Wt 163 lb (73.9 kg)   SpO2 98%   BMI 30.80 kg/m²       Physical Exam   Constitutional: She is oriented to person, place, and time. No distress. Cardiovascular: Normal rate and regular rhythm. Pulmonary/Chest: Effort normal and breath sounds normal.   Neurological: She is alert and oriented to person, place, and time. ASSESSMENT/PLAN    Diagnoses and all orders for this visit:    1. Chronic obstructive pulmonary disease, unspecified COPD type ([address] -patient is much improved today. Continue pulmonary follow-up     2. Essential tremor -stable, continue neurology follow-up    3. Mixed hyperlipidemia -controlled on Zocor        Follow-up Disposition:  Return in about 6 months (around 7/22/2019) for Follow up blood pressure.

## 2019-04-08 DIAGNOSIS — I10 ESSENTIAL HYPERTENSION: ICD-10-CM

## 2019-04-10 RX ORDER — DILTIAZEM HYDROCHLORIDE 120 MG/1
CAPSULE, COATED, EXTENDED RELEASE ORAL
Qty: 90 CAP | Refills: 1 | Status: SHIPPED | OUTPATIENT
Start: 2019-04-10 | End: 2019-07-20 | Stop reason: SDUPTHER

## 2019-06-19 ENCOUNTER — OFFICE VISIT (OUTPATIENT)
Dept: NEUROLOGY | Age: 74
End: 2019-06-19

## 2019-06-19 VITALS
BODY MASS INDEX: 31 KG/M2 | OXYGEN SATURATION: 96 % | DIASTOLIC BLOOD PRESSURE: 68 MMHG | WEIGHT: 164.2 LBS | SYSTOLIC BLOOD PRESSURE: 102 MMHG | RESPIRATION RATE: 16 BRPM | HEART RATE: 66 BPM | HEIGHT: 61 IN

## 2019-06-19 DIAGNOSIS — M54.81 OCCIPITAL NEURALGIA OF RIGHT SIDE: ICD-10-CM

## 2019-06-19 DIAGNOSIS — G25.0 ESSENTIAL TREMOR: ICD-10-CM

## 2019-06-19 DIAGNOSIS — G40.209 CRYPTOGENIC PARTIAL COMPLEX EPILEPSY (HCC): Primary | ICD-10-CM

## 2019-06-19 RX ORDER — LANOLIN ALCOHOL/MO/W.PET/CERES
400 CREAM (GRAM) TOPICAL DAILY
Qty: 30 TAB | Refills: 3 | Status: SHIPPED | OUTPATIENT
Start: 2019-06-19 | End: 2019-12-17

## 2019-06-19 NOTE — PATIENT INSTRUCTIONS
PRESCRIPTION REFILL POLICY [address] [place] [address] Statement to Patients April 1, 2014 In an effort to ensure the large volume of patient prescription refills is processed in the most efficient and expeditious manner, we are asking our patients to assist us by calling your Pharmacy for all prescription refills, this will include also your  Mail Order Pharmacy. The pharmacy will contact our office electronically to continue the refill process. Please do not wait until the last minute to call your pharmacy. We need at least 48 hours (2days) to fill prescriptions. We also encourage you to call your pharmacy before going to  your prescription to make sure it is ready. With regard to controlled substance prescription refill requests (narcotic refills) that need to be picked up at our office, we ask your cooperation by providing us with at least 72 hours (3days) notice that you will need a refill. We will not refill narcotic prescription refill requests after 4:00pm on any weekday, Monday through Thursday, or after 2:00pm on Fridays, or on the weekends. We encourage everyone to explore another way of getting your prescription refill request processed using [email or web address], our patient web portal through our electronic medical record system. [email or web address] is an efficient and effective way to communicate your medication request directly to the office and  downloadable as an rene on your smart phone . [email or web address] also features a review functionality that allows you to view your medication list as well as leave messages for your physician. Are you ready to get connected? If so please review the attatched instructions or speak to any of our staff to get you set up right away! Thank you so much for your cooperation. Should you have any questions please contact our Practice Administrator. The Physicians and Staff,  [address] [place] [address]

## 2019-06-19 NOTE — PROGRESS NOTES
Date:  19     Name:  Jett Biswas  :  1945  MRN:  [id number]     PCP:  Jersey Love MD    Chief Complaint   Patient presents with    Neurologic Problem       HISTORY OF PRESENT ILLNESS: Follow-up visit for seizures, sharp head pain. Patient presents with a new concern of vertigo. Vertigo  She reports that several times a week when she turns her head to the left side. Vertigo will wake her up out of her sleep because she feels as if she is falling into the ground This has not happened before. She currently has some meclizine on board and has not taken it that frequently. She has not tried vestibular therapy however she continues to do exercises at home to help with vertigo. Seizure  The patient is currently being maintained on Lamictal.denies any seizure activity. She did report that her son saw her spacing off for a brief second. She states its only happened. Previously when she saw Dr. Sachin Oro she had reports similar events but she reports that that was more extreme back then. This has subsided and she does not need to think she needs to go to the epilepsy monitoring unit  Sharp head pain   She also reports having sharp head pain on the right side that will radiate up to the middle of her cervical.  He was previously diagnosed with occipital neuralgia and Dr. Sachin Oro was given her injections when this would occur. She reports that her headaches have subsided for a long amount of time however now she is having 1-2 headache days a week. She reports that they will last for about 5 seconds. In addition to the sharp she also has pressure in her head this as well is occur 1-2 times a week. She states that is still not bothersome just noticeable. Current Outpatient Medications   Medication Sig    magnesium oxide (MAG-OX) 400 mg tablet Take 1 Tab by mouth daily.     dilTIAZem CD (CARTIA XT) 120 mg ER capsule TAKE 1 CAPSULE EVERY DAY    simvastatin (ZOCOR) 20 mg tablet TAKE 1 TABLET EVERY NIGHT    albuterol sulfate 2.5 mg/0.5 mL nebu 2.5 mg, ipratropium 0.02 % soln 0.5 mg 1 Dose by Nebulization route.  fluticasone/umeclidin/vilanter (TRELEGY ELLIPTA IN) Take  by inhalation.  aspirin delayed-release 81 mg tablet Take  by mouth daily.  lamoTRIgine (LAMICTAL) 150 mg tablet Take 1 Tab by mouth two (2) times a day. (Patient taking differently: Take 200 mg by mouth two (2) times a day.)    VENTOLIN HFA 90 mcg/actuation inhaler INHALE 1 PUFF EVERY FOUR HOURS AS NEEDED FOR WHEEZING OR SHORTNESS OF BREATH.  meclizine (ANTIVERT) 25 mg tablet Take 1 Tab by mouth three (3) times daily as needed.  ibuprofen (MOTRIN) 800 mg tablet Take 1 Tab by mouth every eight (8) hours as needed for Pain. No current facility-administered medications for this visit.       No Known Allergies  Past Medical History:   Diagnosis Date    Chronic obstructive pulmonary disease (HCC)     Hyperlipidemia     Seizures (HCC)      Past Surgical History:   Procedure Laterality Date    HX BREAST LUMPECTOMY      HX  SECTION      HX ORTHOPAEDIC      trigger finger    HX WISDOM TEETH EXTRACTION       Social History     Socioeconomic History    Marital status:      Spouse name: Not on file    Number of children: Not on file    Years of education: Not on file    Highest education level: Not on file   Occupational History    Not on file   Social Needs    Financial resource strain: Not on file    Food insecurity:     Worry: Not on file     Inability: Not on file    Transportation needs:     Medical: Not on file     Non-medical: Not on file   Tobacco Use    Smoking status: Current Every Day Smoker     Packs/day: 0.50     Years: 50.00     Pack years: 25.00     Types: Cigarettes    Smokeless tobacco: Never Used   Substance and Sexual Activity    Alcohol use: No    Drug use: No    Sexual activity: Never   Lifestyle    Physical activity:     Days per week: Not on file     Minutes per session: Not on file    Stress: Not on file   Relationships    Social connections:     Talks on phone: Not on file     Gets together: Not on file     Attends [employer] service: Not on file     Active member of club or organization: Not on file     Attends meetings of clubs or organizations: Not on file     Relationship status: Not on file    Intimate partner violence:     Fear of current or ex partner: Not on file     Emotionally abused: Not on file     Physically abused: Not on file     Forced sexual activity: Not on file   Other Topics Concern    Not on file   Social History Narrative    Not on file     Family History   Problem Relation Age of Onset    Heart Disease Father     Stroke Father     Cancer Paternal Aunt         breast    Cancer Maternal Grandmother     Cancer Paternal Grandmother         colon    Cancer Son         colon    Cancer Paternal Aunt         breast       PHYSICAL EXAMINATION:    Visit Vitals  /68   Pulse 66   Resp 16   Ht 5' 1\" (1.549 m)   Wt 74.5 kg (164 lb 3.2 oz)   SpO2 96%   BMI 31.03 kg/m²     NEUROLOGICAL EXAMINATION:     Mental Status:   Alert and oriented to person, place, and time. Attention span and concentration are normal. Speech is fluent.       Cranial Nerves:    II, III, IV, VI:  Visual acuity grossly intact. Visual fields are normal.    Pupils are equal, round, and reactive to light and accommodation. Extra-ocular movements are full and fluid. V-XII:   Hearing is grossly intact. Facial features are symmetric, with normal sensation and strength. The palate rises symmetrically and the tongue protrudes midline.       Motor Examination:     Slight weakness of the  strength on the right and slight weakness of the great toe extension on the right. Normal tone, bulk, and strength on the left side.   No cogwheel rigidity or clonus present.       Sensory exam:  Normal throughout.       Coordination:  Finger to nose and rapid arm movement testing was normal.  No tremor seen in the hands today.     Gait and Station:  Steady. Normal arm swing. No Rhomberg or pronator drift. No muscle wasting or fasiculations noted.       Reflexes:  DTRs 2+ throughout. Toes downgoing. ASSESSMENT AND PLAN    ICD-10-CM ICD-9-CM    1. Cryptogenic partial complex epilepsy ([facility] [address] G40.219 345.50    2. Essential tremor G25.0 333.1    3. Occipital neuralgia of right side M54.81 723.8     Partial complex epilepsy   Continue Lamictal    Headache   Start mag 400mg   Tremor    No medication  treatment at this time   . This note will not be viewable in [address]      Wili Paulson NP

## 2019-06-19 NOTE — PROGRESS NOTES
Ms. Thom Mata presents today to follow up dizziness and headaches. She experiences approximately two headaches weekly. Depression screening done on patient.

## 2019-07-08 ENCOUNTER — OFFICE VISIT (OUTPATIENT)
Dept: INTERNAL MEDICINE CLINIC | Age: 74
End: 2019-07-08

## 2019-07-08 VITALS
OXYGEN SATURATION: 97 % | TEMPERATURE: 98 F | WEIGHT: 162.6 LBS | DIASTOLIC BLOOD PRESSURE: 90 MMHG | HEART RATE: 67 BPM | HEIGHT: 61 IN | RESPIRATION RATE: 18 BRPM | BODY MASS INDEX: 30.7 KG/M2 | SYSTOLIC BLOOD PRESSURE: 110 MMHG

## 2019-07-08 DIAGNOSIS — J43.8 OTHER EMPHYSEMA (HCC): ICD-10-CM

## 2019-07-08 DIAGNOSIS — E78.2 MIXED HYPERLIPIDEMIA: ICD-10-CM

## 2019-07-08 DIAGNOSIS — I10 ESSENTIAL HYPERTENSION: ICD-10-CM

## 2019-07-08 DIAGNOSIS — R07.89 CHEST DISCOMFORT: Primary | ICD-10-CM

## 2019-07-08 DIAGNOSIS — G25.0 ESSENTIAL TREMOR: ICD-10-CM

## 2019-07-08 NOTE — PROGRESS NOTES
Pt is here for her routine follow up concerned about weakness LLE, sharp pain to her right shoulder blade feeling as if an arrow went right through her chest followed by fatigue and sweating.

## 2019-07-08 NOTE — PATIENT INSTRUCTIONS
Hip Arthritis: Exercises  Your Care Instructions  Here are some examples of exercises for hip arthritis. Start each exercise slowly. Ease off the exercise if you start to have pain. Your doctor or physical therapist will tell you when you can start these exercises and which ones will work best for you. How to do the exercises  Straight-leg raises to the outside    1. Lie on your side, with your affected hip on top. 2. Tighten the front thigh muscles of your top leg to keep your knee straight. 3. Keep your hip and your leg straight in line with the rest of your body, and keep your knee pointing forward. Do not drop your hip back. 4. Lift your top leg straight up toward the ceiling, about 12 inches off the floor. Hold for about 6 seconds, then slowly lower your leg. 5. Repeat 8 to 12 times. 6. Switch legs and repeat steps 1 through 5, even if only one hip is sore. Straight-leg raises to the inside    1. Lie on your side with your affected hip on the floor. 2. You can either prop up your other leg on a chair, or you can bend that knee and put that foot in front of your other knee. Do not drop your hip back. 3. Tighten the muscles on the front thigh of your bottom leg to straighten that knee. 4. Keep your kneecap pointing forward and your leg straight, and lift your bottom leg up toward the ceiling about 6 inches. Hold for about 6 seconds, then lower slowly. 5. Repeat 8 to 12 times. 6. Switch legs and repeat steps 1 through 5, even if only one hip is sore. Hip hike    1. Stand sideways on the bottom step of a staircase, and hold on to the banister or wall. 2. Keeping both knees straight, lift your good leg off the step and let it hang down. Then hike your good hip up to the same level as your affected hip or a little higher. 3. Repeat 8 to 12 times. 4. Switch legs and repeat steps 1 through 3, even if only one hip is sore. Bridging    1. Lie on your back with both knees bent.  Your knees should be bent about 90 degrees. 2. Then push your feet into the floor, squeeze your buttocks, and lift your hips off the floor until your shoulders, hips, and knees are all in a straight line. 3. Hold for about 6 seconds as you continue to breathe normally, and then slowly lower your hips back down to the floor and rest for up to 10 seconds. 4. Repeat 8 to 12 times. Hamstring stretch (lying down)    1. Lie flat on your back with your legs straight. If you feel discomfort in your back, place a small towel roll under your lower back. 2. Holding the back of your affected leg, lift your leg straight up and toward your body until you feel a stretch at the back of your thigh. 3. Hold the stretch for at least 30 seconds. 4. Repeat 2 to 4 times. 5. Switch legs and repeat steps 1 through 4, even if only one hip is sore. Standing quadriceps stretch    1. If you are not steady on your feet, hold on to a chair, counter, or wall. You can also lie on your stomach or your side to do this exercise. 2. Bend the knee of the leg you want to stretch, and reach behind you to grab the front of your foot or ankle with the hand on the same side. For example, if you are stretching your right leg, use your right hand. 3. Keeping your knees next to each other, pull your foot toward your buttock until you feel a gentle stretch across the front of your hip and down the front of your thigh. Your knee should be pointed directly to the ground, and not out to the side. 4. Hold the stretch for at least 15 to 30 seconds. 5. Repeat 2 to 4 times. 6. Switch legs and repeat steps 1 through 5, even if only one hip is sore. Hip rotator stretch    1. Lie on your back with both knees bent and your feet flat on the floor. 2. Put the ankle of your affected leg on your opposite thigh near your knee. 3. Use your hand to gently push your knee away from your body until you feel a gentle stretch around your hip.   4. Hold the stretch for 15 to 30 seconds. 5. Repeat 2 to 4 times. 6. Repeat steps 1 through 5, but this time use your hand to gently pull your knee toward your opposite shoulder. 7. Switch legs and repeat steps 1 through 6, even if only one hip is sore. Knee-to-chest    1. Lie on your back with your knees bent and your feet flat on the floor. 2. Bring your affected leg to your chest, keeping the other foot flat on the floor (or keeping the other leg straight, whichever feels better on your lower back). 3. Keep your lower back pressed to the floor. Hold for at least 15 to 30 seconds. 4. Relax, and lower the knee to the starting position. 5. Repeat 2 to 4 times. 6. Switch legs and repeat steps 1 through 5, even if only one hip is sore. 7. To get more stretch, put your other leg flat on the floor while pulling your knee to your chest.    Clamshell    1. Lie on your side, with your affected hip on top. Keep your feet and knees together and your knees bent. 2. Raise your top knee, but keep your feet together. Do not let your hips roll back. Your legs should open up like a clamshell. 3. Hold for 6 seconds. 4. Slowly lower your knee back down. Rest for 10 seconds. 5. Repeat 8 to 12 times. 6. Switch legs and repeat steps 1 through 5, even if only one hip is sore. Follow-up care is a key part of your treatment and safety. Be sure to make and go to all appointments, and call your doctor if you are having problems. It's also a good idea to know your test results and keep a list of the medicines you take. Where can you learn more? Go to http://bennett-sangita.info/. Enter Y980 in the search box to learn more about \"Hip Arthritis: Exercises. \"  Current as of: September 20, 2018  Content Version: 11.9  © 7043-6206 [employer]. Care instructions adapted under license by [employer] (which disclaims liability or warranty for this information).  If you have questions about a medical condition or this instruction, always ask your healthcare professional. [address] any warranty or liability for your use of this information. Kegel Exercises: Care Instructions  Your Care Instructions    Kegel exercises strengthen muscles around the bladder. These muscles control the flow of urine. Kegel exercises are sometime called \"pelvic floor\" exercises. They can help prevent urine leakage and keep the pelvic organs in place. A woman who just had a baby might want to try Kegel exercises. They can strengthen pelvic muscles that have been weakened by pregnancy and childbirth. A man or woman may use Kegel exercises to treat urine leakage. You do Kegel exercises by tightening the muscles you use when you urinate. You will likely need to do these exercises for several weeks to get better. Follow-up care is a key part of your treatment and safety. Be sure to make and go to all appointments, and call your doctor if you are having problems. It's also a good idea to know your test results and keep a list of the medicines you take. How can you care for yourself at home? · Do Kegel exercises. ? Find the muscles you need to strengthen. To do this, tighten the muscles that stop your urine while you are going to the bathroom. These are the same muscles you squeeze during Kegel exercises. ? Squeeze the muscles as hard as you can. Your belly and thighs should not move. ? Hold the squeeze for 3 seconds. Then relax for 3 seconds. ? Start with 3 seconds, and then add 1 second each week until you are able to squeeze for 10 seconds. ? Repeat the exercise 10 to 15 times for each session. Do three or more sessions each day. · You can check to see if you are using the right muscles. Place a finger in your vagina and squeeze around it. You are doing them right when you feel pressure around your finger.  Your doctor may also suggest that you put special weights in your vagina while you do the exercises. · Do not smoke. It can irritate the bladder. If you need help quitting, talk to your doctor about stop-smoking programs and medicines. These can increase your chances of quitting for good. Where can you learn more? Go to http://bennett-snagita.info/. Enter K391 in the search box to learn more about \"Kegel Exercises: Care Instructions. \"  Current as of: March 20, 2018  Content Version: 11.9  © 7419-3059 [employer]. Care instructions adapted under license by [employer] (which disclaims liability or warranty for this information). If you have questions about a medical condition or this instruction, always ask your healthcare professional. [address] any warranty or liability for your use of this information. Bladder Training: Care Instructions  Your Care Instructions    Bladder training is used to treat urge incontinence and stress incontinence. Urge incontinence means that the need to urinate comes on so fast that you can't get to a toilet in time. Stress incontinence means that you leak urine because of pressure on your bladder. For example, it may happen when you laugh, cough, or lift something heavy. Bladder training can increase how long you can wait before you have to urinate. It can also help your bladder hold more urine. And it can give you better control over the urge to urinate. It is important to remember that bladder training takes a few weeks to a few months to make a difference. You may not see results right away, but don't give up. Follow-up care is a key part of your treatment and safety. Be sure to make and go to all appointments, and call your doctor if you are having problems. It's also a good idea to know your test results and keep a list of the medicines you take. How can you care for yourself at home? Work with your doctor to come up with a bladder training program that is right for you.  You may use one or more of the following methods. Delayed urination  · In the beginning, try to keep from urinating for 5 minutes after you first feel the need to go. · While you wait, take deep, slow breaths to relax. Kegel exercises can also help you delay the need to go to the bathroom. · After some practice, when you can easily wait 5 minutes to urinate, try to wait 10 minutes before you urinate. · Slowly increase the waiting period until you are able to control when you have to urinate. Scheduled urination  · Empty your bladder when you first wake up in the morning. · Schedule times throughout the day when you will urinate. · Start by going to the bathroom every hour, even if you don't need to go. · Slowly increase the time between trips to the bathroom. · When you have found a schedule that works well for you, keep doing it. · If you wake up during the night and have to urinate, do it. Apply your schedule to waking hours only. Kegel exercises  These tighten and strengthen pelvic muscles, which can help you control the flow of urine. To do Kegel exercises:  · Squeeze the same muscles you would use to stop your urine. Your belly and thighs should not move. · Hold the squeeze for 3 seconds, and then relax for 3 seconds. · Start with 3 seconds. Then add 1 second each week until you are able to squeeze for 10 seconds. · Repeat the exercise 10 to 15 times a session. Do three or more sessions a day. When should you call for help? Watch closely for changes in your health, and be sure to contact your doctor if:    · Your incontinence is getting worse.     · You do not get better as expected. Where can you learn more? Go to http://bennett-sangita.info/. Enter R565 in the search box to learn more about \"Bladder Training: Care Instructions. \"  Current as of: March 20, 2018  Content Version: 11.9  © 4493-0980 [employer].  Care instructions adapted under license by [employer] (which disclaims liability or warranty for this information). If you have questions about a medical condition or this instruction, always ask your healthcare professional. [address] any warranty or liability for your use of this information.

## 2019-07-08 NOTE — PROGRESS NOTES
Follow Up Visit    Steve Sandhu is a 68 y.o. female. she presents for Hypertension and COPD    Cardiovascular Review  The patient has hypertension. She reports taking medications as instructed, no medication side effects noted. Diet and Lifestyle: generally follows a low fat low cholesterol diet, generally follows a low sodium diet. Lab review: labs reviewed and discussed with patient. She has seen Dr. Ilsa Lincoln today. Difficulty breathing in the morning. Uses her rescue inhaler twice and then Trelegy and we are good. Headache is better. She was given magnesium 400mg once a day. Not improving. She continues to complain about a head pressure this comes at any time and lasts 4-5 minutes. The patient noted having chest pain a couple of weeks ago. She felt a sharp pain in her shoulder blade on the right. Then a lot of pressure. Improved after a few minutes. A week ago, she went fishing and had a sharp pain under her chest.  She was pale, sweating and short of breath. She notes that this resolved shortly after its onset and did not return. She is concerned about this event. Patient Active Problem List   Diagnosis Code    HTN (hypertension) I10    Tachyarrhythmia R00.0    Mitral valve prolapse I34.1    Hyperlipidemia E78.5    Obesity (BMI 30.0-34. 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) J44.9         Prior to Admission medications    Medication Sig Start Date End Date Taking? Authorizing Provider   magnesium oxide (MAG-OX) 400 mg tablet Take 1 Tab by mouth daily.  6/19/19  Yes Roshni Cassidy NP   dilTIAZem CD (CARTIA XT) 120 mg ER capsule TAKE 1 CAPSULE EVERY DAY 4/10/19  Yes Tino Rolle MD   simvastatin (ZOCOR) 20 mg tablet TAKE 1 TABLET EVERY NIGHT 1/4/19  Yes Tino Rolle MD   albuterol sulfate 2.5 mg/0.5 mL nebu 2.5 mg, ipratropium 0.02 % soln 0.5 mg 1 Dose by Nebulization route. Yes Provider, Historical   fluticasone/umeclidin/vilanter (TRELEGY ELLIPTA IN) Take  by inhalation. Yes Provider, Historical   aspirin delayed-release 81 mg tablet Take  by mouth daily. Yes Provider, Historical   lamoTRIgine (LAMICTAL) 150 mg tablet Take 1 Tab by mouth two (2) times a day. Patient taking differently: Take 200 mg by mouth two (2) times a day. 9/20/18  Yes Ronnie Cruz MD   VENTOLIN HFA 90 mcg/actuation inhaler INHALE 1 PUFF EVERY FOUR HOURS AS NEEDED FOR WHEEZING OR SHORTNESS OF BREATH. 3/22/18  Yes Ash Cardenas MD   meclizine (ANTIVERT) 25 mg tablet Take 1 Tab by mouth three (3) times daily as needed. 1/10/18  Yes Ash Cardenas MD   ibuprofen (MOTRIN) 800 mg tablet Take 1 Tab by mouth every eight (8) hours as needed for Pain. 10/10/17   Ash Cardenas MD         Health Maintenance   Topic Date Due    Shingrix Vaccine Age 49> (1 of 2) 11/21/1996    Pneumococcal 65+ years (2 of 2 - PPSV23) 11/21/2011    GLAUCOMA SCREENING Q2Y  09/06/2019    BREAST CANCER SCRN MAMMOGRAM  11/01/2019 (Originally 3/31/2018)    Influenza Age 5 to Adult  08/01/2019    FOBT Q 1 YEAR AGE 50-75  10/05/2019    MEDICARE YEARLY EXAM  10/23/2019    DTaP/Tdap/Td series (2 - Td) 11/17/2024    Hepatitis C Screening  Completed    Bone Densitometry (Dexa) Screening  Completed       Review of Systems   Constitutional: Negative. Respiratory: Positive for cough. Cardiovascular: Positive for chest pain. Visit Vitals  /90 (BP 1 Location: Right arm, BP Patient Position: Sitting)   Pulse 67   Temp 98 °F (36.7 °C) (Oral)   Resp 18   Ht 5' 1\" (1.549 m)   Wt 162 lb 9.6 oz (73.8 kg)   SpO2 97%   BMI 30.72 kg/m²       Physical Exam   Constitutional: She is oriented to person, place, and time. HENT:   Mouth/Throat: Oropharynx is clear and moist.   Cardiovascular: Normal rate and regular rhythm.    Pulmonary/Chest: Effort normal and breath sounds normal.   Neurological: She is alert and oriented to person, place, and time. Skin: She is not diaphoretic. ASSESSMENT/PLAN    Diagnoses and all orders for this visit:    1. Chest discomfort  -     AMB POC EKG ROUTINE W/ 12 LEADS, INTER & REP  -     REFERRAL TO CARDIOLOGY  -     NUCLEAR CARDIAC STRESS TEST; Future    2. Essential tremor    3. Other emphysema ([facility] [address]    4. Mixed hyperlipidemia    5. Essential hypertension          Follow-up and Dispositions    · Return in about 3 months (around 10/8/2019) for Follow up.

## 2019-07-12 ENCOUNTER — TELEPHONE (OUTPATIENT)
Dept: INTERNAL MEDICINE CLINIC | Age: 74
End: 2019-07-12

## 2019-07-12 NOTE — TELEPHONE ENCOUNTER
[phone]    Pt would like to see if she should be on antibiotics to go to the dentist to get some teeth pulled

## 2019-07-15 NOTE — TELEPHONE ENCOUNTER
Spoke with patient. She is scheduled to see dentistry, will need extraction. She wanted to know if need to be on antibiotic. advised patient dentist will need to determine antibiotic if needed. Nuclear stress test 07/29/2019 and if there are any pre-instructions before test. Advised patient to contact cardiologist for further instructions. Pt verbalized understanding.

## 2019-07-20 DIAGNOSIS — I10 ESSENTIAL HYPERTENSION: ICD-10-CM

## 2019-07-23 RX ORDER — DILTIAZEM HYDROCHLORIDE 120 MG/1
CAPSULE, COATED, EXTENDED RELEASE ORAL
Qty: 90 CAP | Refills: 1 | Status: SHIPPED | OUTPATIENT
Start: 2019-07-23 | End: 2020-01-28 | Stop reason: SDUPTHER

## 2019-07-25 DIAGNOSIS — E78.2 MIXED HYPERLIPIDEMIA: ICD-10-CM

## 2019-07-26 ENCOUNTER — TELEPHONE (OUTPATIENT)
Dept: CARDIOLOGY CLINIC | Age: 74
End: 2019-07-26

## 2019-07-26 ENCOUNTER — TELEPHONE (OUTPATIENT)
Dept: INTERNAL MEDICINE CLINIC | Age: 74
End: 2019-07-26

## 2019-07-26 NOTE — TELEPHONE ENCOUNTER
Lc Chamorro confirmed 2 pt identifiers used confirmed to hold cardizem for nuc stress test & reviewed other instructions for test & Lc Chamorro reports she was given the other instructions (hold caffiene, etc) by Tia & had no further questions.

## 2019-07-26 NOTE — TELEPHONE ENCOUNTER
Osmani Gomes called asking what medications the pt can and can't take for her nuclear test.  Phone #[phone]  Thanks

## 2019-07-29 ENCOUNTER — DOCUMENTATION ONLY (OUTPATIENT)
Dept: CARDIOLOGY CLINIC | Age: 74
End: 2019-07-29

## 2019-07-29 ENCOUNTER — OFFICE VISIT (OUTPATIENT)
Dept: CARDIOLOGY CLINIC | Age: 74
End: 2019-07-29

## 2019-07-29 VITALS
HEIGHT: 61 IN | WEIGHT: 162 LBS | BODY MASS INDEX: 30.58 KG/M2 | SYSTOLIC BLOOD PRESSURE: 134 MMHG | HEART RATE: 73 BPM | RESPIRATION RATE: 16 BRPM | DIASTOLIC BLOOD PRESSURE: 84 MMHG | OXYGEN SATURATION: 97 %

## 2019-07-29 DIAGNOSIS — R07.89 CHEST DISCOMFORT: Primary | ICD-10-CM

## 2019-07-29 DIAGNOSIS — J43.8 OTHER EMPHYSEMA (HCC): ICD-10-CM

## 2019-07-29 DIAGNOSIS — I10 ESSENTIAL HYPERTENSION: ICD-10-CM

## 2019-07-29 DIAGNOSIS — R60.0 BILATERAL LOWER EXTREMITY EDEMA: ICD-10-CM

## 2019-07-29 DIAGNOSIS — I73.9 CLAUDICATION (HCC): ICD-10-CM

## 2019-07-29 DIAGNOSIS — E78.2 MIXED HYPERLIPIDEMIA: ICD-10-CM

## 2019-07-29 RX ORDER — SIMVASTATIN 20 MG/1
TABLET, FILM COATED ORAL
Qty: 90 TAB | Refills: 1 | Status: SHIPPED | OUTPATIENT
Start: 2019-07-29 | End: 2020-01-21

## 2019-07-29 RX ORDER — DOXYCYCLINE HYCLATE 20 MG
TABLET ORAL
Refills: 2 | COMMUNITY
Start: 2019-07-18 | End: 2021-07-06 | Stop reason: ALTCHOICE

## 2019-07-29 NOTE — PROGRESS NOTES
HISTORY OF PRESENT ILLNESS  Ru Diaz is a 68 y.o. female     SUMMARY:   Problem List  Date Reviewed: 7/28/2019          Codes Class Noted    COPD (chronic obstructive pulmonary disease) ([facility] ICD-10-CM: J44.9  ICD-9-CM: 967  7/20/2018        Cryptogenic partial complex epilepsy ([facility] ICD-10-CM: G40.219  ICD-9-CM: 345.50  12/14/2017        Essential tremor ICD-10-CM: G25.0  ICD-9-CM: 333.1  12/14/2017        Cyst of brain ICD-10-CM: G93.0  ICD-9-CM: 348.0  12/14/2016        HTN (hypertension) ICD-10-CM: I10  ICD-9-CM: 401.9  11/20/2014        Tachyarrhythmia ICD-10-CM: R00.0  ICD-9-CM: 785.0  11/20/2014        Mitral valve prolapse ICD-10-CM: I34.1  ICD-9-CM: 424.0  11/20/2014        Hyperlipidemia ICD-10-CM: E78.5  ICD-9-CM: 272.4  11/20/2014        Obesity (BMI 30.0-34.9) ICD-10-CM: E66.9  ICD-9-CM: 278.00  11/20/2014              Current Outpatient Medications on File Prior to Visit   Medication Sig    simvastatin (ZOCOR) 20 mg tablet TAKE 1 TABLET EVERY NIGHT    dilTIAZem CD (CARDIZEM CD) 120 mg ER capsule TAKE 1 CAPSULE EVERY DAY    magnesium oxide (MAG-OX) 400 mg tablet Take 1 Tab by mouth daily.  albuterol sulfate 2.5 mg/0.5 mL nebu 2.5 mg, ipratropium 0.02 % soln 0.5 mg 1 Dose by Nebulization route.  fluticasone/umeclidin/vilanter (TRELEGY ELLIPTA IN) Take  by inhalation.  aspirin delayed-release 81 mg tablet Take  by mouth daily.  lamoTRIgine (LAMICTAL) 150 mg tablet Take 1 Tab by mouth two (2) times a day. (Patient taking differently: Take 200 mg by mouth two (2) times a day.)    VENTOLIN HFA 90 mcg/actuation inhaler INHALE 1 PUFF EVERY FOUR HOURS AS NEEDED FOR WHEEZING OR SHORTNESS OF BREATH.  meclizine (ANTIVERT) 25 mg tablet Take 1 Tab by mouth three (3) times daily as needed.  ibuprofen (MOTRIN) 800 mg tablet Take 1 Tab by mouth every eight (8) hours as needed for Pain.     doxycycline (PERIOSTAT) 20 mg tablet TAKE 1 CAPSULE BY MOUTH TWICE DAILY     Current Facility-Administered Medications on File Prior to Visit   Medication    [COMPLETED] regadenoson (LEXISCAN) injection 0.4 mg    [COMPLETED] technetium sestamibi TC 99M (CARDIOLITE) injection 10 millicurie    [COMPLETED] technetium sestamibi TC 99M (CARDIOLITE) injection 30 millicurie       CARDIOLOGY STUDIES TO DATE:  4/13 negative lexiscan cardiolyte  7/14 normal echo  10/18 lae, sclerotic non stenotic av, otherwise normal echo    Chief Complaint   Patient presents with    Chest Pain     HPI :  Ms. Paxton Armijo is a 68year-old female referred by Dr. Ana Howard for cardiac evaluation. The first part of June, she had an episode when she was just sitting crocheting where she had pain in her right shoulder blade, which shot through the front and then had chest tightness, which lasted about ten minutes. She had a second episode where she was sitting fishing in mid June and the same thing, though on this occasion she got faint, lightheaded, hot and then cold sweat for about ten minutes and then an hour of chest tightness. At baseline, she is somewhat active with mild dyspnea on exertion related to her COPD and she also has leg fatigue, which sounds like it could be claudication or it could be neurologic. She does not restrict her salt intake. She smokes a half pack per day. She has high cholesterol. No history of hypertension or diabetes. Her father had an MI in his 76s. She has occasional palpitations once or twice a month lasting 10 - 30 seconds, which she has had for many, many years, presumably that is why she is on Cardizem, though she is unclear. She has mild dependent lower extremity edema. She suffers from intermittent vertigo and incontinence.       CARDIAC ROS:   negative for syncope, orthopnea, paroxysmal nocturnal dyspnea, exertional chest pressure/discomfort    Family History   Problem Relation Age of Onset    Heart Disease Father     Stroke Father     Cancer Paternal Aunt         breast    Cancer Maternal Grandmother     Cancer Paternal Grandmother         colon    Cancer Son         colon    Cancer Paternal Aunt         breast       Past Medical History:   Diagnosis Date    Chronic obstructive pulmonary disease ([facility]     Hyperlipidemia     Seizures ([facility]        GENERAL ROS:  A comprehensive review of systems was negative except for that written in the HPI. Visit Vitals  /84 (BP 1 Location: Left arm, BP Patient Position: Sitting)   Pulse 73   Resp 16   Ht 5' 1\" (1.549 m)   Wt 162 lb (73.5 kg)   SpO2 97%   BMI 30.61 kg/m²       Wt Readings from Last 3 Encounters:   07/29/19 162 lb (73.5 kg)   07/29/19 162 lb (73.5 kg)   07/08/19 162 lb 9.6 oz (73.8 kg)            BP Readings from Last 3 Encounters:   07/29/19 134/84   07/08/19 110/90   06/19/19 102/68       PHYSICAL EXAM  General appearance: alert, cooperative, no distress, appears stated age  Neurologic: Alert and oriented X 3  Neck: supple, symmetrical, trachea midline, no adenopathy, no carotid bruit and no JVD  Lungs: clear to auscultation bilaterally  Heart: regular rate and rhythm, S1, S2 normal, no murmur, click, rub or gallop  Abdomen: soft, non-tender.  Bowel sounds normal. No masses,  no organomegaly  Extremities: extremities normal, atraumatic, no cyanosis or edema  Pulses: pedal not palpable    Lab Results   Component Value Date/Time    Cholesterol, total 181 10/24/2018 07:19 AM    Cholesterol, total 167 10/14/2017 08:05 AM    Cholesterol, total 147 10/18/2016 07:23 AM    Cholesterol, total 178 02/20/2016 08:47 AM    Cholesterol, total 164 07/25/2015 07:29 AM    HDL Cholesterol 75 10/24/2018 07:19 AM    HDL Cholesterol 73 10/14/2017 08:05 AM    HDL Cholesterol 63 10/18/2016 07:23 AM    HDL Cholesterol 83 02/20/2016 08:47 AM    HDL Cholesterol 70 07/25/2015 07:29 AM    LDL, calculated 91 10/24/2018 07:19 AM    LDL, calculated 82 10/14/2017 08:05 AM    LDL, calculated 73 10/18/2016 07:23 AM    LDL, calculated 85 02/20/2016 08:47 AM LDL, calculated 79 07/25/2015 07:29 AM    Triglyceride 73 10/24/2018 07:19 AM    Triglyceride 59 10/14/2017 08:05 AM    Triglyceride 54 10/18/2016 07:23 AM    Triglyceride 51 02/20/2016 08:47 AM    Triglyceride 75 07/25/2015 07:29 AM    CHOL/HDL Ratio 2.3 06/10/2010 08:33 AM    CHOL/HDL Ratio 3.3 11/21/2009 06:52 AM    CHOL/HDL Ratio 2.6 03/14/2009 07:05 AM     ASSESSMENT  I suspect most of Ms. Oden's symptoms are from COPD and deconditioning and her chest pain certainly does not sound typical in any way for angina. Dr. David Sinclair ordered a stress test and we will follow up with those results. She could have claudication. I cannot feel her distal pulses, so we are going to get lower extremity arterial Dopplers on her as well. current treatment plan is effective, no change in therapy  lab results and schedule of future lab studies reviewed with patient  reviewed diet, exercise and weight control  very strongly urged to quit smoking to reduce cardiovascular risk    Encounter Diagnoses   Name Primary?  Chest discomfort Yes    Other emphysema ([facility]     Essential hypertension     Mixed hyperlipidemia     Bilateral lower extremity edema      Orders Placed This Encounter    doxycycline (PERIOSTAT) 20 mg tablet       Follow-up and Dispositions    · Return in about 1 month (around 8/26/2019).          Beckie Carlson MD  7/29/2019

## 2019-07-29 NOTE — PROGRESS NOTES
Got message from dr Sayda Borjas, stress test ordered by dr Sherie Alvares was normal. No evidence of any blockages. Please make sure he gets copy and contact pt.  thanks

## 2019-07-29 NOTE — PROGRESS NOTES
Called patient. Verified patient's identity with two identifiers. Notified patient of results and Dr. Benjamin Coy message. I let her know I sent Dr. Lucius Aparicio results. Patient verbalized understanding and denied further questions or concerns.

## 2019-08-07 ENCOUNTER — TELEPHONE (OUTPATIENT)
Dept: CARDIOLOGY CLINIC | Age: 74
End: 2019-08-07

## 2019-08-07 NOTE — TELEPHONE ENCOUNTER
----- Message from Erik Stearns MD sent at 8/7/2019 12:10 PM EDT -----  No leg blockages as cause for symptoms.  Normal.

## 2019-08-07 NOTE — TELEPHONE ENCOUNTER
Tana Torrez. Verified patient's identity with two identifiers. Verified HIPAA. Notified her of results. She will notify patient and denied further questions or concerns.

## 2019-08-09 ENCOUNTER — TELEPHONE (OUTPATIENT)
Dept: NEUROLOGY | Age: 74
End: 2019-08-09

## 2019-08-09 NOTE — TELEPHONE ENCOUNTER
Spoke with daughter, she states the past 2 nights noted with vertigo on left hand side. Woke up in the middle of the night, felt like she was falling. Same thing last night, tried to do her vertigo exercises but made it worse. Took her meclizine which helped. C/o of a throbbing pressure pain noted to head while up. She plans on taking another meclizine later if she needs to. She is concerned because of the vertigo was two days in a row and she never really has this dizziness any more. She wanted 's opinion.

## 2019-08-09 NOTE — TELEPHONE ENCOUNTER
Spoke with Nasim Haro, informed her per -She has had vertigo before. Hershal Maser can reoccur on consecutive days or may last several days. Nury Dorsey should try vestibular exercises and meclizine.  If symptoms are not getting better, she should go to ED. She verbalized understanding.

## 2019-08-09 NOTE — TELEPHONE ENCOUNTER
----- Message from Sharee Cruz sent at 8/9/2019 12:31 PM EDT -----  Regarding: Dr. Liliya Medrano  Pt daughter [address] requesting a call back in regards to multiple vertigo episodes. Best contact [phone].

## 2019-11-07 DIAGNOSIS — R05.9 COUGH: ICD-10-CM

## 2019-11-07 NOTE — TELEPHONE ENCOUNTER
Requested Prescriptions     Pending Prescriptions Disp Refills    albuterol (VENTOLIN HFA) 90 mcg/actuation inhaler 1 Inhaler 3     07/08/2019 12/17/2019  This was faxed by [address]

## 2019-11-11 RX ORDER — ALBUTEROL SULFATE 90 UG/1
AEROSOL, METERED RESPIRATORY (INHALATION)
Qty: 1 INHALER | Refills: 3 | Status: SHIPPED | OUTPATIENT
Start: 2019-11-11 | End: 2021-01-27

## 2019-12-16 ENCOUNTER — OFFICE VISIT (OUTPATIENT)
Dept: NEUROLOGY | Age: 74
End: 2019-12-16

## 2019-12-16 VITALS
HEART RATE: 66 BPM | BODY MASS INDEX: 30.21 KG/M2 | OXYGEN SATURATION: 98 % | RESPIRATION RATE: 16 BRPM | HEIGHT: 61 IN | SYSTOLIC BLOOD PRESSURE: 124 MMHG | DIASTOLIC BLOOD PRESSURE: 82 MMHG | WEIGHT: 160 LBS

## 2019-12-16 DIAGNOSIS — H81.10 BENIGN PAROXYSMAL POSITIONAL VERTIGO, UNSPECIFIED LATERALITY: ICD-10-CM

## 2019-12-16 DIAGNOSIS — G25.0 ESSENTIAL TREMOR: ICD-10-CM

## 2019-12-16 DIAGNOSIS — R29.898 LEFT LEG WEAKNESS: ICD-10-CM

## 2019-12-16 DIAGNOSIS — G93.0 CYST OF BRAIN: ICD-10-CM

## 2019-12-16 DIAGNOSIS — G40.209 CRYPTOGENIC PARTIAL COMPLEX EPILEPSY (HCC): Primary | ICD-10-CM

## 2019-12-16 DIAGNOSIS — G44.85 IDIOPATHIC STABBING HEADACHE: ICD-10-CM

## 2019-12-16 NOTE — PATIENT INSTRUCTIONS
PRESCRIPTION REFILL POLICY [facility] Statement to Patients April 1, 2014 In an effort to ensure the large volume of patient prescription refills is processed in the most efficient and expeditious manner, we are asking our patients to assist us by calling your Pharmacy for all prescription refills, this will include also your  Mail Order Pharmacy. The pharmacy will contact our office electronically to continue the refill process. Please do not wait until the last minute to call your pharmacy. We need at least 48 hours (2days) to fill prescriptions. We also encourage you to call your pharmacy before going to  your prescription to make sure it is ready. With regard to controlled substance prescription refill requests (narcotic refills) that need to be picked up at our office, we ask your cooperation by providing us with at least 72 hours (3days) notice that you will need a refill. We will not refill narcotic prescription refill requests after 4:00pm on any weekday, Monday through Thursday, or after 2:00pm on Fridays, or on the weekends. We encourage everyone to explore another way of getting your prescription refill request processed using [employer], our patient web portal through our electronic medical record system. [employer] is an efficient and effective way to communicate your medication request directly to the office and  downloadable as an rene on your smart phone . [employer] also features a review functionality that allows you to view your medication list as well as leave messages for your physician. Are you ready to get connected? If so please review the attatched instructions or speak to any of our staff to get you set up right away! Thank you so much for your cooperation. Should you have any questions please contact our Practice Administrator. The Physicians and Staff,  [facility]

## 2019-12-16 NOTE — PROGRESS NOTES
Chief Complaint   Patient presents with    Headache         HISTORY OF PRESENT ILLNESS  Awais Koch came back for follow up. She has not had any further seizures since then and is currently taking lamotrigine 150 mg twice a day. Her last level was therapeutic. Right-sided temporal headache is also better. She rarely gets a sharp, stabbing type pain that lasts a few seconds and then goes away. Lamotrigine seems to have helped with it. She has been experiencing vertigo off and on over the past few months. She tries to do home exercise and takes meclizine as needed but she still finds it hard to lay on her left side or if she gets up too quick, things start to shift/move in front of her eyes. She has also been noticing pain in her left thigh and some weakness in her left lower extremity and at times the left leg seems to give out while walking. This has been a chronic problem going on for at least 2 years but she has been noticing it more for the past 6 months. Denies any back pain or sensory symptoms. She had an EEG for evaluation of spells where she had alteration of awareness and could not respond to people. EEG showed epileptiform activity intermittently out of the left temporal head region. She was also having occasional weakness in the right upper and lower extremity. She has never had any witnessed generalized convulsion. 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 This was stable on her last scan. The tremor in her hands has improved. Current Outpatient Medications   Medication Sig    albuterol (VENTOLIN HFA) 90 mcg/actuation inhaler INHALE 1 PUFF EVERY FOUR HOURS AS NEEDED FOR WHEEZING OR SHORTNESS OF BREATH.     simvastatin (ZOCOR) 20 mg tablet TAKE 1 TABLET EVERY NIGHT    doxycycline (PERIOSTAT) 20 mg tablet TAKE 1 CAPSULE BY MOUTH TWICE DAILY    dilTIAZem CD (CARDIZEM CD) 120 mg ER capsule TAKE 1 CAPSULE EVERY DAY    fluticasone/umeclidin/vilanter (TRELEGY ELLIPTA IN) Take  by inhalation.  aspirin delayed-release 81 mg tablet Take  by mouth daily.  lamoTRIgine (LAMICTAL) 150 mg tablet Take 1 Tab by mouth two (2) times a day. (Patient taking differently: Take 200 mg by mouth two (2) times a day.)    meclizine (ANTIVERT) 25 mg tablet Take 1 Tab by mouth three (3) times daily as needed.  ibuprofen (MOTRIN) 800 mg tablet Take 1 Tab by mouth every eight (8) hours as needed for Pain.  magnesium oxide (MAG-OX) 400 mg tablet Take 1 Tab by mouth daily. No current facility-administered medications for this visit. PHYSICAL EXAMINATION:        NEUROLOGICAL EXAMINATION:     Mental Status:   Alert and oriented to person, place, and time. Attention span and concentration are normal. Speech is fluent. Cranial Nerves:    II, III, IV, VI:  Visual acuity grossly intact. Visual fields are normal.    Pupils are equal, round, and reactive to light and accommodation. Extra-ocular movements are full and fluid. V-XII: Hearing is grossly intact. Facial features are symmetric, with normal sensation and strength. The palate rises symmetrically and the tongue protrudes midline. Motor Examination: Slight weakness of the  strength on the right and slight weakness of the great toe extension on the right. Normal tone, bulk, and strength on the left side. No cogwheel rigidity or clonus present. Sensory exam:  Normal throughout. Coordination:  Finger to nose and rapid arm movement testing was normal.  No tremor seen in the hands today. Gait and Station:  Steady. At times, seems to favor her left lower extremity. Normal arm swing. No Rhomberg or pronator drift. No muscle wasting or fasiculations noted. Reflexes:  DTRs 2+ throughout. Toes downgoing.         LABS / IMAGING  Lab Results   Component Value Date/Time    Sodium 143 12/14/2019 07:43 AM    Potassium 3.8 12/14/2019 07:43 AM    Chloride 104 12/14/2019 07:43 AM    CO2 24 12/14/2019 07:43 AM    Anion gap 8 07/16/2018 10:35 AM    Glucose 95 12/14/2019 07:43 AM    BUN 15 12/14/2019 07:43 AM    Creatinine 0.86 12/14/2019 07:43 AM    BUN/Creatinine ratio 17 12/14/2019 07:43 AM    GFR est AA 78 12/14/2019 07:43 AM    GFR est non-AA 67 12/14/2019 07:43 AM    Calcium 9.3 12/14/2019 07:43 AM    Bilirubin, total 0.4 12/14/2019 07:43 AM    AST (SGOT) 15 12/14/2019 07:43 AM    Alk. phosphatase 81 12/14/2019 07:43 AM    Protein, total 6.2 12/14/2019 07:43 AM    Albumin 4.1 12/14/2019 07:43 AM    Globulin 3.6 02/01/2018 04:03 PM    A-G Ratio 2.0 12/14/2019 07:43 AM    ALT (SGPT) 15 12/14/2019 07:43 AM     Lab Results   Component Value Date/Time    WBC 7.1 12/14/2019 07:43 AM    HGB 14.7 12/14/2019 07:43 AM    HCT 40.8 12/14/2019 07:43 AM    PLATELET 262 57/08/9927 07:43 AM    MCV 91 12/14/2019 07:43 AM     Last Lamotrigine level was 4.3    ASSESSMENT    ICD-10-CM ICD-9-CM    1. Cryptogenic partial complex epilepsy ([facility] G40.219 345.50    2. Idiopathic stabbing headache G44.85 339.85    3. Cyst of brain G93.0 348.0    4. Essential tremor G25.0 333.1    5. Left leg weakness R29.898 729.89 EMG NCV MOTOR WO F/WAVE PER NERVE   6. Benign paroxysmal positional vertigo, unspecified laterality H81.10 386.11 REFERRAL TO PHYSICAL THERAPY       DISCUSSION  Ms. Adryan Bernal has had episodes in the past where she could not respond to move. Complex partial seizures are suspected given abnormal EEG. She is doing well from seizure standpoint and should continue lamotrigine 150 mg twice a day. Some of these episodes in the past were related to stress and there has been a concern that these may be nonepileptic. We may consider EMU in the future if the spells return. She also has idiopathic stabbing headache which has now subsided.      She has BPPV and I have recommended a course of vestibular PT    She reports weakness in the left lower extremity but I do not notice any focal motor weakness or sensory deficit. She does tend to favor her left lower extremity while walking and it is unclear if this is due to some underlying musculoskeletal pain. I will check EMG/NCV to rule out a neuropathic cause. She has been having retro-orbital pain in the right side and has some temporal tenderness. Suspect that this may be an idiopathic primary headache such as stabbing headache. 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 Scans have been stable over the past 2-3 years. She has benign essential tremor involving her arms/hands. It sometimes affects her head as well. This does not seem to be bothering her and wishes to defer any other pharmacologic therapy. Follow-up for EMG    Tavares Doran MD  Diplomate, American Board of Psychiatry & Neurology (Neurology)  [place] Board of Psychiatry & Neurology (Clinical Neurophysiology)  Diplomate, American Board of Electrodiagnostic Medicine      This note will not be viewable in [address]

## 2019-12-16 NOTE — PROGRESS NOTES
Ms. Yimi Roberson presents today to follow up headaches and vertigo. Depression screening done on patient.

## 2019-12-17 ENCOUNTER — OFFICE VISIT (OUTPATIENT)
Dept: INTERNAL MEDICINE CLINIC | Age: 74
End: 2019-12-17

## 2019-12-17 VITALS
OXYGEN SATURATION: 97 % | DIASTOLIC BLOOD PRESSURE: 80 MMHG | WEIGHT: 160.2 LBS | HEIGHT: 59 IN | HEART RATE: 67 BPM | SYSTOLIC BLOOD PRESSURE: 120 MMHG | RESPIRATION RATE: 18 BRPM | BODY MASS INDEX: 32.3 KG/M2 | TEMPERATURE: 98.5 F

## 2019-12-17 DIAGNOSIS — R31.21 ASYMPTOMATIC MICROSCOPIC HEMATURIA: ICD-10-CM

## 2019-12-17 DIAGNOSIS — Z00.00 MEDICARE ANNUAL WELLNESS VISIT, SUBSEQUENT: Primary | ICD-10-CM

## 2019-12-17 DIAGNOSIS — Z12.11 COLON CANCER SCREENING: ICD-10-CM

## 2019-12-17 NOTE — PROGRESS NOTES
This is the Subsequent Medicare Annual Wellness Exam, performed 12 months or more after the Initial AWV or the last Subsequent AWV    I have reviewed the patient's medical history in detail and updated the computerized patient record. Saw pulmonology--plan for CT scan    Seen neurology--EMG and Vestibular therapy planned. Refer to urology--    Had chest pain  in AM    Left side--starts in the back. Negative stress test.  Possible GERD. On Doxycycline for periodontal disease. History     Patient Active Problem List   Diagnosis Code    HTN (hypertension) I10    Tachyarrhythmia R00.0    Mitral valve prolapse I34.1    Hyperlipidemia E78.5    Obesity (BMI 30.0-34. 9) E66.9    Cyst of brain G93.0    Cryptogenic partial complex epilepsy ([facility] G40.219    Essential tremor G25.0    COPD (chronic obstructive pulmonary disease) (HCC) J44.9     Past Medical History:   Diagnosis Date    Chronic obstructive pulmonary disease (HCC)     Hyperlipidemia     Seizures (HCC)       Past Surgical History:   Procedure Laterality Date    HX BREAST LUMPECTOMY      HX  SECTION      HX ORTHOPAEDIC      trigger finger    HX WISDOM TEETH EXTRACTION       Current Outpatient Medications   Medication Sig Dispense Refill    albuterol (VENTOLIN HFA) 90 mcg/actuation inhaler INHALE 1 PUFF EVERY FOUR HOURS AS NEEDED FOR WHEEZING OR SHORTNESS OF BREATH. 1 Inhaler 3    simvastatin (ZOCOR) 20 mg tablet TAKE 1 TABLET EVERY NIGHT 90 Tab 1    doxycycline (PERIOSTAT) 20 mg tablet TAKE 1 CAPSULE BY MOUTH TWICE DAILY  2    dilTIAZem CD (CARDIZEM CD) 120 mg ER capsule TAKE 1 CAPSULE EVERY DAY 90 Cap 1    fluticasone/umeclidin/vilanter (TRELEGY ELLIPTA IN) Take  by inhalation.  aspirin delayed-release 81 mg tablet Take  by mouth daily.  lamoTRIgine (LAMICTAL) 150 mg tablet Take 1 Tab by mouth two (2) times a day.  (Patient taking differently: Take 200 mg by mouth two (2) times a day.) 180 Tab 3    meclizine (ANTIVERT) 25 mg tablet Take 1 Tab by mouth three (3) times daily as needed. 30 Tab 1    ibuprofen (MOTRIN) 800 mg tablet Take 1 Tab by mouth every eight (8) hours as needed for Pain. 27 Tab 1     No Known Allergies    Family History   Problem Relation Age of Onset    Heart Disease Father     Stroke Father     Cancer Paternal Aunt         breast    Cancer Maternal Grandmother     Cancer Paternal Grandmother         colon    Cancer Son         colon    Cancer Paternal Aunt         breast     Social History     Tobacco Use    Smoking status: Current Every Day Smoker     Packs/day: 0.50     Years: 50.00     Pack years: 25.00     Types: Cigarettes    Smokeless tobacco: Never Used   Substance Use Topics    Alcohol use: No       Depression Risk Factor Screening:     3 most recent PHQ Screens 12/17/2019   Little interest or pleasure in doing things Not at all   Feeling down, depressed, irritable, or hopeless Not at all   Total Score PHQ 2 0       Alcohol Risk Factor Screening:   Do you average 1 drink per night or more than 7 drinks a week:  No    On any one occasion in the past three months have you have had more than 3 drinks containing alcohol:  No      Functional Ability and Level of Safety:   Hearing: Hearing is good. Activities of Daily Living: The home contains: "[employer]  Patient does total self care    Ambulation: with mild difficulty    Fall Risk:  Fall Risk Assessment, last 12 mths 12/17/2019   Able to walk? Yes   Fall in past 12 months? Yes   Fall with injury? No   Number of falls in past 12 months 1   Fall Risk Score 1       Abuse Screen:  Patient is not abused    Cognitive Screening   Has your family/caregiver stated any concerns about your memory: yes--some short term memory.     Cognitive Screening: Normal - MMSE (Mini Mental Status Exam)    Patient Care Team   Patient Care Team:  Salma Nelson MD as PCP - General (Internal Medicine)  Salma Nelson MD as PCP - [facility] Provider  Dang Saab MD (Neurology)  Luke Alvarez MD as Physician (Cardiology)    Assessment/Plan   Education and counseling provided:  Are appropriate based on today's review and evaluation    Diagnoses and all orders for this visit:    1. Medicare annual wellness visit, subsequent    2. Asymptomatic microscopic hematuria  -     REFERRAL TO UROLOGY    3.  Colon cancer screening  -     OCCULT BLOOD IMMUNOASSAY,DIAGNOSTIC        Health Maintenance Due   Topic Date Due    Shingrix Vaccine Age 50> (1 of 2) 11/21/1996    GLAUCOMA SCREENING Q2Y  09/06/2019    FOBT Q 1 YEAR AGE 50-75  10/05/2019    Pneumococcal 65+ years (2 of 2 - PPSV23) 10/22/2019    MEDICARE YEARLY EXAM  10/23/2019

## 2019-12-17 NOTE — PATIENT INSTRUCTIONS
Medicare Wellness Visit, Female The best way to live healthy is to have a lifestyle where you eat a well-balanced diet, exercise regularly, limit alcohol use, and quit all forms of tobacco/nicotine, if applicable. Regular preventive services are another way to keep healthy. Preventive services (vaccines, screening tests, monitoring & exams) can help personalize your care plan, which helps you manage your own care. Screening tests can find health problems at the earliest stages, when they are easiest to treat. [facility] follows the current, evidence-based guidelines published by the [facility] ([facility]) when recommending preventive services for our patients. Because we follow these guidelines, sometimes recommendations change over time as research supports it. (For example, mammograms used to be recommended annually. Even though Medicare will still pay for an annual mammogram, the newer guidelines recommend a mammogram every two years for women of average risk). Of course, you and your doctor may decide to screen more often for some diseases, based on your risk and your co-morbidities (chronic disease you are already diagnosed with). Preventive services for you include: - Medicare offers their members a free annual wellness visit, which is time for you and your primary care provider to discuss and plan for your preventive service needs. Take advantage of this benefit every year! 
-All adults over the age of 72 should receive the recommended pneumonia vaccines. Current USPSTF guidelines recommend a series of two vaccines for the best pneumonia protection.  
-All adults should have a flu vaccine yearly and a tetanus vaccine every 10 years.  
-All adults age 48 and older should receive the shingles vaccines (series of two vaccines). -All adults age 38-68 who are overweight should have a diabetes screening test once every three years. -All adults born between [birth year] and 1964 should be screened once for Hepatitis C. 
-Other screening tests and preventive services for persons with diabetes include: an eye exam to screen for diabetic retinopathy, a kidney function test, a foot exam, and stricter control over your cholesterol.  
-Cardiovascular screening for adults with routine risk involves an electrocardiogram (ECG) at intervals determined by your doctor.  
-Colorectal cancer screenings should be done for adults age 54-65 with no increased risk factors for colorectal cancer. There are a number of acceptable methods of screening for this type of cancer. Each test has its own benefits and drawbacks. Discuss with your doctor what is most appropriate for you during your annual wellness visit. The different tests include: colonoscopy (considered the best screening method), a fecal occult blood test, a fecal DNA test, and sigmoidoscopy. 
 
-A bone mass density test is recommended when a woman turns 65 to screen for osteoporosis. This test is only recommended one time, as a screening. Some providers will use this same test as a disease monitoring tool if you already have osteoporosis. -Breast cancer screenings are recommended every other year for women of normal risk, age 54-69. 
-Cervical cancer screenings for women over age 72 are only recommended with certain risk factors. Here is a list of your current Health Maintenance items (your personalized list of preventive services) with a due date: 
Health Maintenance Due Topic Date Due  Shingles Vaccine (1 of 2) 11/21/1996  Glaucoma Screening   09/06/2019  Stool testing for trace blood  10/05/2019  Pneumococcal Vaccine (2 of 2 - PPSV23) 10/22/2019 [address] [place] [address] Annual Well Visit  10/23/2019

## 2020-01-02 ENCOUNTER — TELEPHONE (OUTPATIENT)
Dept: NEUROLOGY | Age: 75
End: 2020-01-02

## 2020-01-02 NOTE — TELEPHONE ENCOUNTER
Dr. Alek Steinberg, patient has already called her insurance company directly and they will not cover Vestibular Therapy at all. Please advise.

## 2020-01-02 NOTE — TELEPHONE ENCOUNTER
Natalie Pallas, hipaa verified, calling because the pt got a referral for vestibular PT however her insurance does not cover it. Please call back with other option.

## 2020-01-05 DIAGNOSIS — H81.10 BENIGN PAROXYSMAL POSITIONAL VERTIGO, UNSPECIFIED LATERALITY: Primary | ICD-10-CM

## 2020-01-06 ENCOUNTER — TELEPHONE (OUTPATIENT)
Dept: NEUROLOGY | Age: 75
End: 2020-01-06

## 2020-01-06 NOTE — TELEPHONE ENCOUNTER
----- Message from Qian Torres sent at 1/6/2020 10:48 AM EST -----  Regarding: Dr. Chad Sal  Patient return call    Caller's first and last name and relationship (if not the patient): Estiven Oliver- daughter in law      Best contact number(s): [phone]      Whose call is being returned: office      Details to clarify the request:      Qian Torres

## 2020-01-07 LAB — HEMOCCULT STL QL IA: NEGATIVE

## 2020-01-21 DIAGNOSIS — G40.209 CRYPTOGENIC PARTIAL COMPLEX EPILEPSY (HCC): ICD-10-CM

## 2020-01-21 DIAGNOSIS — E78.2 MIXED HYPERLIPIDEMIA: ICD-10-CM

## 2020-01-21 RX ORDER — SIMVASTATIN 20 MG/1
TABLET, FILM COATED ORAL
Qty: 90 TAB | Refills: 1 | Status: SHIPPED | OUTPATIENT
Start: 2020-01-21 | End: 2020-07-17

## 2020-01-22 RX ORDER — LAMOTRIGINE 150 MG/1
200 TABLET ORAL 2 TIMES DAILY
Qty: 60 TAB | Refills: 3 | Status: SHIPPED | OUTPATIENT
Start: 2020-01-22 | End: 2020-07-17

## 2020-01-28 DIAGNOSIS — I10 ESSENTIAL HYPERTENSION: ICD-10-CM

## 2020-01-28 RX ORDER — DILTIAZEM HYDROCHLORIDE 120 MG/1
CAPSULE, COATED, EXTENDED RELEASE ORAL
Qty: 90 CAP | Refills: 1 | Status: SHIPPED | OUTPATIENT
Start: 2020-01-28 | End: 2020-04-22

## 2020-01-30 ENCOUNTER — OFFICE VISIT (OUTPATIENT)
Dept: NEUROLOGY | Age: 75
End: 2020-01-30

## 2020-01-30 VITALS
RESPIRATION RATE: 16 BRPM | OXYGEN SATURATION: 98 % | BODY MASS INDEX: 32.25 KG/M2 | SYSTOLIC BLOOD PRESSURE: 118 MMHG | DIASTOLIC BLOOD PRESSURE: 82 MMHG | HEIGHT: 59 IN | HEART RATE: 72 BPM | WEIGHT: 160 LBS

## 2020-01-30 DIAGNOSIS — R29.898 LEFT LEG WEAKNESS: ICD-10-CM

## 2020-01-30 NOTE — PROGRESS NOTES
[facility]  [address] [place] [address]  Phone [phone] Fax [phone]  Test Date:  2020    Patient: Mariola Renteria : 1945 Physician: Doyle Oneill MD   Sex: Female Height: 4' 11\" Ref Phys: Doyle Oneill MD   ID#: [id number] Weight: 160 lbs. Technician: Michael Allen. .[employer] TECH     Patient Complaints:  LLE    Patient History / Exam:  81-year-old female who is being evaluated for weakness in the left lower extremity. She also reports difficulty going up steps and left leg tends to give out. On exam: Alert and fully oriented. Cranial nerves II through XII intact with muscle tone above normal per strength 5/5 in all extremities proximally and distally. DTRs 2/2 and symmetric. Toes downgoing. Slightly favors left lower extremity while walking. NCV & EMG Findings:  All nerve conduction studies were within normal limits. All F Wave latencies were within normal limits. All examined muscles (as indicated in the following table) showed no evidence of electrical instability.       Impression:    Motor and sensory nerve parameters of the left lower extremity were normal.  Concentric needle EMG of selected muscles of left lower extremity was normal.     The electrodiagnostic testing is normal.  There is no evidence to suggest an entrapment mononeuropathy or lumbosacral radiculopathy on the left.     ___________________________  Doyle Oneill MD        Nerve Conduction Studies  Anti Sensory Summary Table     Stim Site NR Peak (ms) Norm Peak (ms) P-T Amp (µV) Norm P-T Amp Onset (ms) Site1 Site2 Delta-P (ms) Dist (cm) Otis (m/s) Norm Otis (m/s)   Left Sup Peroneal Anti Sensory (Ant Lat Mall)  32.4°C   14 cm    2.7 <4.4 27.0 >5.0 2.1 14 cm Ant Lat Mall 2.7 10.0 37 >32   Left Sural Anti Sensory (Lat Mall)  32.3°C   Calf    3.5 <4.0 10.7 >5.0 3.1 Calf Lat Mall 3.5 14.0 40 >35     Motor Summary Table Stim Site NR Onset (ms) Norm Onset (ms) O-P Amp (mV) Norm O-P Amp Site1 Site2 Delta-0 (ms) Dist (cm) Otis (m/s) Norm Otis (m/s)   Left Peroneal Motor (Ext Dig Brev)  31.5°C   Ankle    3.5 <6.1 6.1 >2.5 B Fib Ankle 5.5 28.0 51 >38   B Fib    9.0  5.9  Poplt B Fib 2.0 10.0 50 >40   Poplt    11.0  5.3          Left Tibial Motor (Abd Palma Brev)  31.9°C   Ankle    3.0 <6.1 12.9 >3.0 Knee Ankle 7.2 38.0 53 >35   Knee    10.2  10.6            F Wave Studies     NR F-Lat (ms) Lat Norm (ms) L-R F-Lat (ms) L-R Lat Norm   Left Tibial (Mrkrs) (Abd Hallucis)  32°C      44.81 <61  <5.7     EMG     Side Muscle Nerve Root Ins Act Fibs Psw Amp Dur Poly Recrt Int Pat Comment   Left AntTibialis Dp Br Peronel L4-5 Nml Nml Nml Nml Nml 0 Nml Nml    Left Peroneus Long Sup Br Peronel L5-S1 Nml Nml Nml Nml Nml 0 Nml Nml    Left Gastroc Tibial S1-2 Nml Nml Nml Nml Nml 0 Nml Nml    Left Flex Dig Long Tibial L5-S2 Nml Nml Nml Nml Nml 0 Nml Nml    Left VastusLat Femoral L2-4 Nml Nml Nml Nml Nml 0 Nml Nml    Left Lumbo Parasp Low Rami L5-S1 Nml Nml Nml               Waveforms:

## 2020-04-20 DIAGNOSIS — I10 ESSENTIAL HYPERTENSION: ICD-10-CM

## 2020-04-21 ENCOUNTER — OFFICE VISIT (OUTPATIENT)
Dept: INTERNAL MEDICINE CLINIC | Age: 75
End: 2020-04-21

## 2020-04-21 DIAGNOSIS — J43.8 OTHER EMPHYSEMA (HCC): ICD-10-CM

## 2020-04-21 DIAGNOSIS — I10 ESSENTIAL HYPERTENSION: Primary | ICD-10-CM

## 2020-04-21 DIAGNOSIS — G40.209 CRYPTOGENIC PARTIAL COMPLEX EPILEPSY (HCC): ICD-10-CM

## 2020-04-21 NOTE — PROGRESS NOTES
Brian Diaz is a 69 y.o. female evaluated via telephone on 4/21/2020. Consent:  She and/or health care decision maker is aware that that she may receive a bill for this telephone service, depending on her insurance coverage, and has provided verbal consent to proceed: Yes      The following sections were reviewed and/or updated:  Patient Active Problem List    Diagnosis Date Noted    [facility] 07/20/2018    Cryptogenic partial complex epilepsy ([facility] 12/14/2017    Essential tremor 12/14/2017    Cyst of brain 12/14/2016    HTN (hypertension) 11/20/2014    Tachyarrhythmia 11/20/2014    Mitral valve prolapse 11/20/2014    Hyperlipidemia 11/20/2014    Obesity (BMI 30.0-34.9) 11/20/2014     Current Outpatient Medications   Medication Sig Dispense Refill    dilTIAZem CD (CARDIZEM CD) 120 mg ER capsule TAKE 1 CAPSULE EVERY DAY 90 Cap 1    lamoTRIgine (LAMICTAL) 150 mg tablet Take 1 Tab by mouth two (2) times a day. 60 Tab 3    simvastatin (ZOCOR) 20 mg tablet TAKE 1 TABLET EVERY NIGHT 90 Tab 1    albuterol (VENTOLIN HFA) 90 mcg/actuation inhaler INHALE 1 PUFF EVERY FOUR HOURS AS NEEDED FOR WHEEZING OR SHORTNESS OF BREATH. 1 Inhaler 3    doxycycline (PERIOSTAT) 20 mg tablet TAKE 1 CAPSULE BY MOUTH TWICE DAILY  2    fluticasone/umeclidin/vilanter (TRELEGY ELLIPTA IN) Take 1 Puff by inhalation daily.  aspirin delayed-release 81 mg tablet Take  by mouth daily.  meclizine (ANTIVERT) 25 mg tablet Take 1 Tab by mouth three (3) times daily as needed. 30 Tab 1    ibuprofen (MOTRIN) 800 mg tablet Take 1 Tab by mouth every eight (8) hours as needed for Pain.  30 Tab 1     No Known Allergies  Past Medical History:   Diagnosis Date    Chronic obstructive pulmonary disease (HCC)     Hyperlipidemia     Seizures (HCC)      Family History   Problem Relation Age of Onset    Heart Disease Father     Stroke Father     Cancer Paternal Aunt         breast    Cancer Maternal Grandmother     Cancer Paternal Grandmother         colon    Cancer Son         colon    Cancer Paternal Aunt         breast         Documentation:  I communicated with the patient and/or health care decision maker regarding: She remains stable on medications. She had breathing difficulty early in March. Followed closely by pulmonary. Symptoms have resolved. Remains on seizure medications. Needs refill of diltaizem. Will refill today. Details of this discussion including any medical advice provided is documented below. Diagnoses and all orders for this visit:    1. Essential hypertension    2. Other emphysema ([facility]    3. Cryptogenic partial complex epilepsy ([facility]           I affirm this is a Patient Initiated Episode with an Established Patient who has not had a related appointment within my department in the past 7 days or scheduled within the next 24 hours.     Total Time: minutes: 11-20 minutes    Note: not billable if this call serves to triage the patient into an appointment for the relevant concern      Luis Hebert MD

## 2020-04-21 NOTE — PROGRESS NOTES
Chief Complaint   Patient presents with    Follow-up     1. Have you been to the ER, urgent care clinic since your last visit? Hospitalized since your last visit? No    2. Have you seen or consulted any other health care providers outside of the [address] [place] [address] since your last visit? Include any pap smears or colon screening.  Yes Where: [facility] Reason for visit: Vertigo/Urologist

## 2020-04-22 RX ORDER — DILTIAZEM HYDROCHLORIDE 120 MG/1
CAPSULE, COATED, EXTENDED RELEASE ORAL
Qty: 90 CAP | Refills: 1 | Status: SHIPPED | OUTPATIENT
Start: 2020-04-22 | End: 2020-10-29

## 2020-05-22 ENCOUNTER — TELEPHONE (OUTPATIENT)
Dept: INTERNAL MEDICINE CLINIC | Age: 75
End: 2020-05-22

## 2020-05-22 ENCOUNTER — VIRTUAL VISIT (OUTPATIENT)
Dept: INTERNAL MEDICINE CLINIC | Age: 75
End: 2020-05-22

## 2020-05-22 DIAGNOSIS — M54.50 ACUTE BILATERAL LOW BACK PAIN WITHOUT SCIATICA: Primary | ICD-10-CM

## 2020-05-22 RX ORDER — CYCLOBENZAPRINE HCL 5 MG
5 TABLET ORAL
Qty: 12 TAB | Refills: 0 | Status: SHIPPED | OUTPATIENT
Start: 2020-05-22 | End: 2020-10-08 | Stop reason: ALTCHOICE

## 2020-05-22 RX ORDER — METHYLPREDNISOLONE 4 MG/1
TABLET ORAL
Qty: 1 DOSE PACK | Refills: 0 | Status: SHIPPED | OUTPATIENT
Start: 2020-05-22 | End: 2020-10-08 | Stop reason: ALTCHOICE

## 2020-05-22 NOTE — PROGRESS NOTES
Mily Ayala is a 69 y.o. female who was seen by synchronous (real-time) audio-video technology on 5/22/2020. Consent: Mily Ayala who was seen by synchronous (real-time) audio-video technology, and/or her healthcare decision maker, is aware that this patient-initiated, Telehealth encounter on 5/22/2020 is a billable service, with coverage as determined by her insurance carrier. She is aware that she may receive a bill and has provided verbal consent to proceed: Yes. Patient identification was verified prior to start of the visit. A caregiver was present when appropriate. Due to this being a TeleHealth encounter (during [address] emergency), evaluation of the following organ systems was limited: VS/Constituional/EENT/Resp/CV/GI//MS/Neuro/Skin/Heme-Lymph-Imm. Pursuant to the emergency declaration under the [address] [place] [address] waiver authority and the [employer] Act, this Virtual Visit was conducted, with patient's (and/or legal guardian's) consent, to reduce the patient's risk of exposure to COVID-19 and provide necessary medical care. Services were provided through a synchronous discussion virtually to substitute for in-person clinic visit. I was in the office. The patient was at home. Assessment & Plan:   Diagnoses and all orders for this visit:    1. Acute bilateral low back pain without sciatica    Other orders  -     methylPREDNISolone (MEDROL DOSEPACK) 4 mg tablet; Follow dose pack instructions  -     cyclobenzaprine (FLEXERIL) 5 mg tablet; Take 1 Tab by mouth three (3) times daily as needed for Muscle Spasm(s).     ice every 3 hours for 20 minutes  Follow up if no improvement over the next week  Avoid heavy lifting    I spent at least 23 minutes on this visit with this established patient. ([id number])  Subjective:   Mily Ayala was seen for LOW BACK PAIN      Patient reports mod-severe low back pain since yesterday. Patient denies radiation of pain down the legs. Pain is present across her low back. No numbness or tingling. She has tried tylenol, motrin, and ice with no relief. Pain is worse with sitting or standing for long periods. She injured her back lifting her grandchildren yesterday. Prior to Admission medications    Medication Sig Start Date End Date Taking? Authorizing Provider   methylPREDNISolone (MEDROL DOSEPACK) 4 mg tablet Follow dose pack instructions 5/22/20  Yes Chaim Cedillo NP   cyclobenzaprine (FLEXERIL) 5 mg tablet Take 1 Tab by mouth three (3) times daily as needed for Muscle Spasm(s). 5/22/20  Yes Chaim Cedillo NP   dilTIAZem CD (CARDIZEM CD) 120 mg ER capsule TAKE 1 CAPSULE EVERY DAY 4/22/20   Uche Jacobo MD   lamoTRIgine (LAMICTAL) 150 mg tablet Take 1 Tab by mouth two (2) times a day. 1/22/20   Roamn Alexander MD   simvastatin (ZOCOR) 20 mg tablet TAKE 1 TABLET EVERY NIGHT 1/21/20   Uche Jacobo MD   albuterol (VENTOLIN HFA) 90 mcg/actuation inhaler INHALE 1 PUFF EVERY FOUR HOURS AS NEEDED FOR WHEEZING OR SHORTNESS OF BREATH. 11/11/19   Uche Jacobo MD   doxycycline (PERIOSTAT) 20 mg tablet TAKE 1 CAPSULE BY MOUTH TWICE DAILY 7/18/19   Provider, Historical   fluticasone/umeclidin/vilanter (TRELEGY ELLIPTA IN) Take 1 Puff by inhalation daily. Provider, Historical   aspirin delayed-release 81 mg tablet Take  by mouth daily. Provider, Historical   meclizine (ANTIVERT) 25 mg tablet Take 1 Tab by mouth three (3) times daily as needed. 1/10/18   Uche Jacobo MD   ibuprofen (MOTRIN) 800 mg tablet Take 1 Tab by mouth every eight (8) hours as needed for Pain. 10/10/17   Uche Jacobo MD       No Known Allergies    Patient Active Problem List   Diagnosis Code    HTN (hypertension) I10    Tachyarrhythmia R00.0    Mitral valve prolapse I34.1    Hyperlipidemia E78.5    Obesity (BMI 30.0-34. 9) E66.9    Cyst of brain G93.0    Cryptogenic partial complex epilepsy ([facility]) G40.209    Essential tremor G25.0    COPD (chronic obstructive pulmonary disease) ([facility]) J44.9     Patient Active Problem List    Diagnosis Date Noted    COPD (chronic obstructive pulmonary disease) ([facility] 2018    Cryptogenic partial complex epilepsy ([facility] 2017    Essential tremor 2017    Cyst of brain 2016    HTN (hypertension) 2014    Tachyarrhythmia 2014    Mitral valve prolapse 2014    Hyperlipidemia 2014    Obesity (BMI 30.0-34.9) 2014     Current Outpatient Medications   Medication Sig Dispense Refill    methylPREDNISolone (MEDROL DOSEPACK) 4 mg tablet Follow dose pack instructions 1 Dose Pack 0    cyclobenzaprine (FLEXERIL) 5 mg tablet Take 1 Tab by mouth three (3) times daily as needed for Muscle Spasm(s). 12 Tab 0    dilTIAZem CD (CARDIZEM CD) 120 mg ER capsule TAKE 1 CAPSULE EVERY DAY 90 Cap 1    lamoTRIgine (LAMICTAL) 150 mg tablet Take 1 Tab by mouth two (2) times a day. 60 Tab 3    simvastatin (ZOCOR) 20 mg tablet TAKE 1 TABLET EVERY NIGHT 90 Tab 1    albuterol (VENTOLIN HFA) 90 mcg/actuation inhaler INHALE 1 PUFF EVERY FOUR HOURS AS NEEDED FOR WHEEZING OR SHORTNESS OF BREATH. 1 Inhaler 3    doxycycline (PERIOSTAT) 20 mg tablet TAKE 1 CAPSULE BY MOUTH TWICE DAILY  2    fluticasone/umeclidin/vilanter (TRELEGY ELLIPTA IN) Take 1 Puff by inhalation daily.  aspirin delayed-release 81 mg tablet Take  by mouth daily.  meclizine (ANTIVERT) 25 mg tablet Take 1 Tab by mouth three (3) times daily as needed. 30 Tab 1    ibuprofen (MOTRIN) 800 mg tablet Take 1 Tab by mouth every eight (8) hours as needed for Pain.  30 Tab 1     No Known Allergies  Past Medical History:   Diagnosis Date    Chronic obstructive pulmonary disease (HCC)     Hyperlipidemia     Seizures (HCC)      Past Surgical History:   Procedure Laterality Date    HX BREAST LUMPECTOMY      HX  SECTION      HX ORTHOPAEDIC      trigger finger    HX WISDOM TEETH EXTRACTION       Family History   Problem Relation Age of Onset    Heart Disease Father     Stroke Father     Cancer Paternal Aunt         breast    Cancer Maternal Grandmother     Cancer Paternal Grandmother         colon    Cancer Son         colon    Cancer Paternal Aunt         breast     Social History     Tobacco Use    Smoking status: Current Every Day Smoker     Packs/day: 0.50     Years: 50.00     Pack years: 25.00     Types: Cigarettes    Smokeless tobacco: Never Used   Substance Use Topics    Alcohol use: No          ROS - per HPI      Objective:     General: alert, cooperative, no distress   Mental  status: normal mood, behavior, speech, dress, motor activity, and thought processes, able to follow commands   Eyes: EOM intact, normal sclera   Mouth: not examined   Neck: no visualized mass   Resp: normal effort and no respiratory distress   Neuro: no gross deficits   Musculoskeletal: Bilateral lumbar paraspinal tenderness, not radiating, worse with bending forward or twisting, range of motion limited due to pain. + spasms noted, no numbness or weakness; walking slowly due to pain   Skin: no discoloration or lesions of concern on visible areas   Psychiatric: normal affect, no hallucinations         We discussed the expected course, resolution and complications of the diagnosis(es) in detail. Medication risks, benefits, costs, interactions, and alternatives were discussed as indicated. I advised her to contact the office if her condition worsens, changes or fails to improve as anticipated. She expressed understanding with the diagnosis(es) and plan.      Angel Seaman NP

## 2020-05-22 NOTE — PATIENT INSTRUCTIONS

## 2020-05-22 NOTE — TELEPHONE ENCOUNTER
Patient complains of lower back pain, pulled muscle. Playing with grandchildren. Tried heat and cold. Tylenol and Advil not effective. Pain when sitting, bending and getting up. Pain scale 10/10, constant ache. Please call cell # [phone] for virtual visit.

## 2020-05-22 NOTE — TELEPHONE ENCOUNTER
Pt says she thinks she pulled a muscle in her lower back. She has pain above tail bone on both sides. She asks if there is anything at home she can do to help. Can schedule TM if needed.

## 2020-07-16 DIAGNOSIS — E78.2 MIXED HYPERLIPIDEMIA: ICD-10-CM

## 2020-07-17 RX ORDER — SIMVASTATIN 20 MG/1
TABLET, FILM COATED ORAL
Qty: 90 TAB | Refills: 1 | Status: SHIPPED | OUTPATIENT
Start: 2020-07-17 | End: 2021-01-27

## 2020-08-05 LAB — EF %, EXTERNAL: NORMAL

## 2020-09-02 ENCOUNTER — OFFICE VISIT (OUTPATIENT)
Dept: INTERNAL MEDICINE CLINIC | Age: 75
End: 2020-09-02
Payer: MEDICARE

## 2020-09-02 VITALS
HEART RATE: 63 BPM | OXYGEN SATURATION: 98 % | RESPIRATION RATE: 18 BRPM | DIASTOLIC BLOOD PRESSURE: 70 MMHG | HEIGHT: 59 IN | TEMPERATURE: 97.3 F | SYSTOLIC BLOOD PRESSURE: 116 MMHG | BODY MASS INDEX: 31.45 KG/M2 | WEIGHT: 156 LBS

## 2020-09-02 DIAGNOSIS — M94.0 COSTOCHONDRITIS: Primary | ICD-10-CM

## 2020-09-02 DIAGNOSIS — R07.89 CHEST WALL DISCOMFORT: ICD-10-CM

## 2020-09-02 PROCEDURE — 99214 OFFICE O/P EST MOD 30 MIN: CPT | Performed by: INTERNAL MEDICINE

## 2020-09-02 PROCEDURE — 1101F PT FALLS ASSESS-DOCD LE1/YR: CPT | Performed by: INTERNAL MEDICINE

## 2020-09-02 PROCEDURE — 3017F COLORECTAL CA SCREEN DOC REV: CPT | Performed by: INTERNAL MEDICINE

## 2020-09-02 PROCEDURE — G8427 DOCREV CUR MEDS BY ELIG CLIN: HCPCS | Performed by: INTERNAL MEDICINE

## 2020-09-02 PROCEDURE — G8754 DIAS BP LESS 90: HCPCS | Performed by: INTERNAL MEDICINE

## 2020-09-02 PROCEDURE — G8536 NO DOC ELDER MAL SCRN: HCPCS | Performed by: INTERNAL MEDICINE

## 2020-09-02 PROCEDURE — G8510 SCR DEP NEG, NO PLAN REQD: HCPCS | Performed by: INTERNAL MEDICINE

## 2020-09-02 PROCEDURE — G8417 CALC BMI ABV UP PARAM F/U: HCPCS | Performed by: INTERNAL MEDICINE

## 2020-09-02 PROCEDURE — G8399 PT W/DXA RESULTS DOCUMENT: HCPCS | Performed by: INTERNAL MEDICINE

## 2020-09-02 PROCEDURE — 1090F PRES/ABSN URINE INCON ASSESS: CPT | Performed by: INTERNAL MEDICINE

## 2020-09-02 PROCEDURE — G8752 SYS BP LESS 140: HCPCS | Performed by: INTERNAL MEDICINE

## 2020-09-02 RX ORDER — IBUPROFEN 800 MG/1
800 TABLET ORAL
Qty: 30 TAB | Refills: 1 | Status: SHIPPED | OUTPATIENT
Start: 2020-09-02 | End: 2020-10-08 | Stop reason: ALTCHOICE

## 2020-09-02 NOTE — PROGRESS NOTES
Acute Care Note    Quinten Posada is 69 y.o. female. she presents for evaluation of Back Pain    Back Pain  Patient presents for presents evaluation of upper back discomfort. Symptoms have been present for 5 days and include stiffness in the upper back with some pain with movement. Initial inciting event: none. Alleviating factors identifiable by patient are recumbency. Exacerbating factors identifiable by patient are recumbency. Treatments so far initiated by patient: none         Prior to Admission medications    Medication Sig Start Date End Date Taking? Authorizing Provider   lamoTRIgine (LaMICtal) 150 mg tablet TAKE 1 TABLET TWICE DAILY 7/17/20  Yes Joselito Cole MD   simvastatin (ZOCOR) 20 mg tablet TAKE 1 TABLET EVERY NIGHT 7/17/20  Yes Marilee Colón MD   dilTIAZem CD (CARDIZEM CD) 120 mg ER capsule TAKE 1 CAPSULE EVERY DAY 4/22/20  Yes Marilee Colón MD   albuterol (VENTOLIN HFA) 90 mcg/actuation inhaler INHALE 1 PUFF EVERY FOUR HOURS AS NEEDED FOR WHEEZING OR SHORTNESS OF BREATH. 11/11/19  Yes Marilee Colón MD   doxycycline (PERIOSTAT) 20 mg tablet TAKE 1 CAPSULE BY MOUTH TWICE DAILY 7/18/19  Yes Provider, Historical   fluticasone/umeclidin/vilanter (TRELEGY ELLIPTA IN) Take 1 Puff by inhalation daily. Yes Provider, Historical   aspirin delayed-release 81 mg tablet Take  by mouth daily. Yes Provider, Historical   meclizine (ANTIVERT) 25 mg tablet Take 1 Tab by mouth three (3) times daily as needed. Patient taking differently: Take 25 mg by mouth daily. 1/10/18  Yes Marilee Colón MD   methylPREDNISolone (MEDROL DOSEPACK) 4 mg tablet Follow dose pack instructions 5/22/20   Rain Cedillo NP   cyclobenzaprine (FLEXERIL) 5 mg tablet Take 1 Tab by mouth three (3) times daily as needed for Muscle Spasm(s). 5/22/20   Rain Cedillo NP   ibuprofen (MOTRIN) 800 mg tablet Take 1 Tab by mouth every eight (8) hours as needed for Pain.  10/10/17   Marilee Colón MD         Patient Active Problem List   Diagnosis Code    HTN (hypertension) I10    Tachyarrhythmia R00.0    Mitral valve prolapse I34.1    Hyperlipidemia E78.5    Obesity (BMI 30.0-34. 9) E66.9    Cyst of brain G93.0    Cryptogenic partial complex epilepsy ([facility] G40.209    Essential tremor G25.0    COPD (chronic obstructive pulmonary disease) ([facility] J44.9         Review of Systems   Constitutional: Negative. Respiratory: Negative. Cardiovascular: Negative. Musculoskeletal: Negative. Visit Vitals  /70 (BP 1 Location: Left arm)   Pulse 63   Temp 97.3 °F (36.3 °C) (Temporal)   Resp 18   Ht 4' 11\" (1.499 m)   Wt 156 lb (70.8 kg)   SpO2 98%   BMI 31.51 kg/m²       Physical Exam  Cardiovascular:      Rate and Rhythm: Normal rate and regular rhythm. Pulses: Normal pulses. Heart sounds: Normal heart sounds. Musculoskeletal:      Comments: Tenderness to palpation at the chest wall at the upper back on the right. Neurological:      Mental Status: She is alert. Diagnoses and all orders for this visit:    1. Costochondritis    2. Chest wall discomfort  -     ibuprofen (MOTRIN) 800 mg tablet; Take 1 Tab by mouth every eight (8) hours as needed for Pain. Advised the patient to call back or return to office if symptoms worsen/change/persist.   Discussed expected course/resolution/complications of diagnosis in detail with patient. Medication risks/benefits/costs/interactions/alternatives discussed with patient. The patient was given an after visit summary which includes diagnoses, current medications, & vitals. They expressed understanding with the diagnosis and plan.

## 2020-10-08 ENCOUNTER — OFFICE VISIT (OUTPATIENT)
Dept: INTERNAL MEDICINE CLINIC | Age: 75
End: 2020-10-08
Payer: MEDICARE

## 2020-10-08 VITALS
DIASTOLIC BLOOD PRESSURE: 80 MMHG | RESPIRATION RATE: 20 BRPM | HEIGHT: 59 IN | WEIGHT: 157 LBS | TEMPERATURE: 97.8 F | OXYGEN SATURATION: 97 % | HEART RATE: 78 BPM | BODY MASS INDEX: 31.65 KG/M2 | SYSTOLIC BLOOD PRESSURE: 130 MMHG

## 2020-10-08 DIAGNOSIS — Z13.39 SCREENING FOR ALCOHOLISM: ICD-10-CM

## 2020-10-08 DIAGNOSIS — M62.830 SPASM OF MUSCLE OF LOWER BACK: ICD-10-CM

## 2020-10-08 DIAGNOSIS — Z13.31 SCREENING FOR DEPRESSION: ICD-10-CM

## 2020-10-08 DIAGNOSIS — Z00.00 MEDICARE ANNUAL WELLNESS VISIT, SUBSEQUENT: Primary | ICD-10-CM

## 2020-10-08 PROCEDURE — G8754 DIAS BP LESS 90: HCPCS | Performed by: INTERNAL MEDICINE

## 2020-10-08 PROCEDURE — 3288F FALL RISK ASSESSMENT DOCD: CPT | Performed by: INTERNAL MEDICINE

## 2020-10-08 PROCEDURE — G8510 SCR DEP NEG, NO PLAN REQD: HCPCS | Performed by: INTERNAL MEDICINE

## 2020-10-08 PROCEDURE — G8752 SYS BP LESS 140: HCPCS | Performed by: INTERNAL MEDICINE

## 2020-10-08 PROCEDURE — G0439 PPPS, SUBSEQ VISIT: HCPCS | Performed by: INTERNAL MEDICINE

## 2020-10-08 PROCEDURE — 3017F COLORECTAL CA SCREEN DOC REV: CPT | Performed by: INTERNAL MEDICINE

## 2020-10-08 PROCEDURE — G8427 DOCREV CUR MEDS BY ELIG CLIN: HCPCS | Performed by: INTERNAL MEDICINE

## 2020-10-08 PROCEDURE — 1100F PTFALLS ASSESS-DOCD GE2>/YR: CPT | Performed by: INTERNAL MEDICINE

## 2020-10-08 PROCEDURE — G8399 PT W/DXA RESULTS DOCUMENT: HCPCS | Performed by: INTERNAL MEDICINE

## 2020-10-08 RX ORDER — ACETAMINOPHEN 500 MG
1 TABLET ORAL DAILY
COMMUNITY

## 2020-10-08 RX ORDER — METHOCARBAMOL 500 MG/1
500 TABLET, FILM COATED ORAL
Qty: 30 TAB | Refills: 1 | Status: SHIPPED | OUTPATIENT
Start: 2020-10-08 | End: 2020-11-25 | Stop reason: SDUPTHER

## 2020-10-08 RX ORDER — IPRATROPIUM BROMIDE AND ALBUTEROL SULFATE 2.5; .5 MG/3ML; MG/3ML
SOLUTION RESPIRATORY (INHALATION)
COMMUNITY
Start: 2020-07-04

## 2020-10-08 RX ORDER — CHROMIUM PICOLINATE 200 MCG
2 TABLET ORAL DAILY
COMMUNITY
End: 2021-07-06 | Stop reason: ALTCHOICE

## 2020-10-08 NOTE — PROGRESS NOTES
This is the Subsequent Medicare Annual Wellness Exam, performed 12 months or more after the Initial AWV or the last Subsequent AWV    I have reviewed the patient's medical history in detail and updated the computerized patient record. History     Patient Active Problem List   Diagnosis Code    HTN (hypertension) I10    Tachyarrhythmia R00.0    Mitral valve prolapse I34.1    Hyperlipidemia E78.5    Obesity (BMI 30.0-34. 9) E66.9    Cyst of brain G93.0    Cryptogenic partial complex epilepsy ([facility] [address] G40.209    Essential tremor G25.0    COPD (chronic obstructive pulmonary disease) (HCC) J44.9     Past Medical History:   Diagnosis Date    Chronic obstructive pulmonary disease (HCC)     Hyperlipidemia     Seizures (HCC)       Past Surgical History:   Procedure Laterality Date    HX BREAST LUMPECTOMY      HX  SECTION      HX ORTHOPAEDIC      trigger finger    HX WISDOM TEETH EXTRACTION       Current Outpatient Medications   Medication Sig Dispense Refill    cholecalciferol (VITAMIN D3) (2,000 UNITS /50 MCG) cap capsule Take 1 Cap by mouth daily.  Calcium-Cholecalciferol, D3, 600 mg(1,500mg) -400 unit cap Take 2 Tabs by mouth daily.  lamoTRIgine (LaMICtal) 150 mg tablet TAKE 1 TABLET TWICE DAILY 180 Tab 2    simvastatin (ZOCOR) 20 mg tablet TAKE 1 TABLET EVERY NIGHT 90 Tab 1    dilTIAZem CD (CARDIZEM CD) 120 mg ER capsule TAKE 1 CAPSULE EVERY DAY 90 Cap 1    albuterol (VENTOLIN HFA) 90 mcg/actuation inhaler INHALE 1 PUFF EVERY FOUR HOURS AS NEEDED FOR WHEEZING OR SHORTNESS OF BREATH. 1 Inhaler 3    doxycycline (PERIOSTAT) 20 mg tablet TAKE 1 CAPSULE BY MOUTH TWICE DAILY  2    fluticasone/umeclidin/vilanter (TRELEGY ELLIPTA IN) Take 1 Puff by inhalation daily.  aspirin delayed-release 81 mg tablet Take 81 mg by mouth every fourty-eight (48) hours.       albuterol-ipratropium (DUO-NEB) 2.5 mg-0.5 mg/3 ml nebu INHALE 1 VIAL VIA NEBULIZER FOUR TIMES DAILY AS NEEDED      meclizine (ANTIVERT) 25 mg tablet Take 1 Tab by mouth three (3) times daily as needed. (Patient taking differently: Take 25 mg by mouth three (3) times daily as needed for Dizziness.) 30 Tab 1     No Known Allergies    Family History   Problem Relation Age of Onset    Heart Disease Father     Stroke Father     Cancer Paternal Aunt         breast    Cancer Maternal Grandmother     Cancer Paternal Grandmother         colon    Cancer Son         colon    Cancer Paternal Aunt         breast     Social History     Tobacco Use    Smoking status: Current Every Day Smoker     Packs/day: 0.50     Years: 50.00     Pack years: 25.00     Types: Cigarettes    Smokeless tobacco: Never Used   Substance Use Topics    Alcohol use: No       Depression Risk Factor Screening:     3 most recent PHQ Screens 10/8/2020   Little interest or pleasure in doing things Not at all   Feeling down, depressed, irritable, or hopeless Several days   Total Score PHQ 2 1       Alcohol Risk Screen   Do you average more than 1 drink per night or more than 7 drinks a week:  No    On any one occasion in the past three months have you have had more than 3 drinks containing alcohol:  No        Functional Ability and Level of Safety:   Hearing: Hearing is good. Activities of Daily Living: The home contains: no safety equipment. Patient needs help with:  transportation and housework     Ambulation: with mild difficulty     Fall Risk:  Fall Risk Assessment, last 12 mths 10/8/2020   Able to walk? Yes   Fall in past 12 months? Yes   Fall with injury?  Yes   Number of falls in past 12 months 2   Fall Risk Score 3     Abuse Screen:  Patient is not abused       Cognitive Screening   Has your family/caregiver stated any concerns about your memory: yes - some short term memory deficits     Cognitive Screening: Normal - MMSE (Mini Mental Status Exam)    Patient Care Team   Patient Care Team:  Corby Ortez MD as PCP - General (Internal Medicine)  Kalen Bustos MD as PCP - [facility] Empaneled Provider  Pauline Connelly MD (Neurology)  Amelia Dorsey MD as Physician (Cardiology)    Assessment/Plan   Education and counseling provided:  Are appropriate based on today's review and evaluation    Diagnoses and all orders for this visit:    1. Medicare annual wellness visit, subsequent    2. Screening for alcoholism    3. Screening for depression  -     DEPRESSION SCREEN ANNUAL    4. Spasm of muscle of lower back  -     methocarbamoL (ROBAXIN) 500 mg tablet; Take 1 Tab by mouth nightly as needed for Muscle Spasm(s).         Health Maintenance Due   Topic Date Due    Shingrix Vaccine Age 49> (1 of 2) 11/21/1996    Breast Cancer Screen Mammogram  03/31/2018    Pneumococcal 65+ years (2 of 2 - PPSV23) 10/22/2019    Flu Vaccine (1) 09/01/2020

## 2020-10-08 NOTE — PATIENT INSTRUCTIONS
Medicare Wellness Visit, Female The best way to live healthy is to have a lifestyle where you eat a well-balanced diet, exercise regularly, limit alcohol use, and quit all forms of tobacco/nicotine, if applicable. Regular preventive services are another way to keep healthy. Preventive services (vaccines, screening tests, monitoring & exams) can help personalize your care plan, which helps you manage your own care. Screening tests can find health problems at the earliest stages, when they are easiest to treat. [facility] follows the current, evidence-based guidelines published by the [facility] ([facility]) when recommending preventive services for our patients. Because we follow these guidelines, sometimes recommendations change over time as research supports it. (For example, mammograms used to be recommended annually. Even though Medicare will still pay for an annual mammogram, the newer guidelines recommend a mammogram every two years for women of average risk). Of course, you and your doctor may decide to screen more often for some diseases, based on your risk and your co-morbidities (chronic disease you are already diagnosed with). Preventive services for you include: - Medicare offers their members a free annual wellness visit, which is time for you and your primary care provider to discuss and plan for your preventive service needs. Take advantage of this benefit every year! 
-All adults over the age of 72 should receive the recommended pneumonia vaccines. Current USPSTF guidelines recommend a series of two vaccines for the best pneumonia protection.  
-All adults should have a flu vaccine yearly and a tetanus vaccine every 10 years.  
-All adults age 48 and older should receive the shingles vaccines (series of two vaccines). -All adults age 38-68 who are overweight should have a diabetes screening test once every three years. -All adults born between [birth year] and 1964 should be screened once for Hepatitis C. 
-Other screening tests and preventive services for persons with diabetes include: an eye exam to screen for diabetic retinopathy, a kidney function test, a foot exam, and stricter control over your cholesterol.  
-Cardiovascular screening for adults with routine risk involves an electrocardiogram (ECG) at intervals determined by your doctor.  
-Colorectal cancer screenings should be done for adults age 54-65 with no increased risk factors for colorectal cancer. There are a number of acceptable methods of screening for this type of cancer. Each test has its own benefits and drawbacks. Discuss with your doctor what is most appropriate for you during your annual wellness visit. The different tests include: colonoscopy (considered the best screening method), a fecal occult blood test, a fecal DNA test, and sigmoidoscopy. 
 
-A bone mass density test is recommended when a woman turns 65 to screen for osteoporosis. This test is only recommended one time, as a screening. Some providers will use this same test as a disease monitoring tool if you already have osteoporosis. -Breast cancer screenings are recommended every other year for women of normal risk, age 54-69. 
-Cervical cancer screenings for women over age 72 are only recommended with certain risk factors. Here is a list of your current Health Maintenance items (your personalized list of preventive services) with a due date: 
Health Maintenance Due Topic Date Due  Shingles Vaccine (1 of 2) 11/21/1996  Mammogram  03/31/2018  Pneumococcal Vaccine (2 of 2 - PPSV23) 10/22/2019  Yearly Flu Vaccine (1) 09/01/2020

## 2020-10-29 DIAGNOSIS — I10 ESSENTIAL HYPERTENSION: ICD-10-CM

## 2020-10-29 RX ORDER — DILTIAZEM HYDROCHLORIDE 120 MG/1
CAPSULE, COATED, EXTENDED RELEASE ORAL
Qty: 90 CAP | Refills: 1 | Status: SHIPPED | COMMUNITY
Start: 2020-10-29 | End: 2020-10-30 | Stop reason: SDUPTHER

## 2020-10-29 RX ORDER — DILTIAZEM HYDROCHLORIDE 120 MG/1
CAPSULE, COATED, EXTENDED RELEASE ORAL
Qty: 90 CAP | Refills: 1 | OUTPATIENT
Start: 2020-10-29

## 2020-10-29 NOTE — TELEPHONE ENCOUNTER
Requested Prescriptions     Pending Prescriptions Disp Refills    dilTIAZem ER (CARDIZEM CD) 120 mg capsule 90 Cap 1       Pt is out of meds and will not get mail order in time - she is asking we refill for 14 days at [facility].    [facility] Hospitaljonathan , Rh[facility]

## 2020-10-30 ENCOUNTER — TELEPHONE (OUTPATIENT)
Dept: INTERNAL MEDICINE CLINIC | Age: 75
End: 2020-10-30

## 2020-10-30 DIAGNOSIS — I10 ESSENTIAL HYPERTENSION: ICD-10-CM

## 2020-10-30 RX ORDER — DILTIAZEM HYDROCHLORIDE 120 MG/1
CAPSULE, COATED, EXTENDED RELEASE ORAL
Qty: 14 CAP | Refills: 0 | Status: SHIPPED | OUTPATIENT
Start: 2020-10-30 | End: 2021-05-05

## 2020-10-30 NOTE — TELEPHONE ENCOUNTER
Patient will be out of her dilTIAZem ER (CARDIZEM CD) 120 mg capsules tomorrow. A 90day script was finally sent to [facility] yesterday -- but she had requested it 2 weeks ago. Asked if you could send 14 pills to her local pharmacy today. Dr. Junior Bates has left for the day, so sending to DEVAN Cedillo who is on call. [facility] [address]  In [place]

## 2020-11-17 ENCOUNTER — TELEPHONE (OUTPATIENT)
Dept: INTERNAL MEDICINE CLINIC | Age: 75
End: 2020-11-17

## 2020-11-17 NOTE — TELEPHONE ENCOUNTER
Patrizia Ovalles, daughter in law [phone]     Patient never heard about her lab results or UA. They need to schedule an appt with the urologist, so need to know how the UA came out. Also would like to know how the labs were. Patient does not use mychart. Please call.

## 2020-11-25 ENCOUNTER — TELEPHONE (OUTPATIENT)
Dept: INTERNAL MEDICINE CLINIC | Age: 75
End: 2020-11-25

## 2020-11-25 DIAGNOSIS — M62.830 SPASM OF MUSCLE OF LOWER BACK: ICD-10-CM

## 2020-11-25 RX ORDER — METHOCARBAMOL 500 MG/1
TABLET, FILM COATED ORAL
Qty: 30 TAB | Refills: 0 | Status: SHIPPED | OUTPATIENT
Start: 2020-11-25 | End: 2021-07-06 | Stop reason: ALTCHOICE

## 2020-11-25 NOTE — TELEPHONE ENCOUNTER
Patient called again to find out about whether she can take more of the methocarbamol or if Dr. Tucker More wants to give her something different for the pulled muscle. Please call today.

## 2020-11-25 NOTE — TELEPHONE ENCOUNTER
Patient pulled the muscle in her back again -- just did this same thing in October, but says this is worse. Has been using heat and ice with no relief. Took one of the muscle relaxers last night which did not help. Asking if she should increase the dosage or could Dr. Lucille Mora send in something else. Please let patient know today since it is a holiday tomorrow.

## 2020-11-27 ENCOUNTER — TELEPHONE (OUTPATIENT)
Dept: INTERNAL MEDICINE CLINIC | Age: 75
End: 2020-11-27

## 2020-11-27 NOTE — TELEPHONE ENCOUNTER
----- Message from Angeli Girard sent at 11/25/2020  5:13 PM EST -----  Regarding: Dr Bonnie Padron  Patient return call    Caller's first and last name and relationship (if not the patient):      Best contact number(s): [phone]      Whose call is being returned: not sure      Details to clarify the request: not sure what it is regarding      Angeli Girard

## 2021-01-11 ENCOUNTER — TELEPHONE (OUTPATIENT)
Dept: INTERNAL MEDICINE CLINIC | Age: 76
End: 2021-01-11

## 2021-01-11 NOTE — TELEPHONE ENCOUNTER
Patient went to [facility] ER in [place] yesterday for a varicose vein in the left leg. Was told she has cellulitis and DVT, was given meds -- Cephalexin 500mg, Tylenol with codeine #3, and Rivaroxaban (Xarelto starter pack). Daughter in law wants to know if any of the above meds would interfere with what she is already on -- has mitral valve prolapse. MADDISONI, patient's pulmonologist had her on Azithromycin for difficulty breathing with a cold (has COPD), but daughter in law told her to stop taking that since she is now on Cephalexin. Please advise. Patient has no computer, internet or iphone.

## 2021-01-11 NOTE — TELEPHONE ENCOUNTER
Returned call to patient daughter in law Kristi Munoz. Dr Karyna Nicholson does not think antibiotics or xarelto will interfere with her meds. Consult pulmonary about azithromycin they prescribed.

## 2021-01-15 ENCOUNTER — VIRTUAL VISIT (OUTPATIENT)
Dept: INTERNAL MEDICINE CLINIC | Age: 76
End: 2021-01-15
Payer: MEDICARE

## 2021-01-15 DIAGNOSIS — I82.402 ACUTE DEEP VEIN THROMBOSIS (DVT) OF LEFT LOWER EXTREMITY, UNSPECIFIED VEIN (HCC): Primary | ICD-10-CM

## 2021-01-15 DIAGNOSIS — L03.116 LEFT LEG CELLULITIS: ICD-10-CM

## 2021-01-15 PROCEDURE — 1090F PRES/ABSN URINE INCON ASSESS: CPT | Performed by: INTERNAL MEDICINE

## 2021-01-15 PROCEDURE — G8428 CUR MEDS NOT DOCUMENT: HCPCS | Performed by: INTERNAL MEDICINE

## 2021-01-15 PROCEDURE — 99214 OFFICE O/P EST MOD 30 MIN: CPT | Performed by: INTERNAL MEDICINE

## 2021-01-15 PROCEDURE — G8399 PT W/DXA RESULTS DOCUMENT: HCPCS | Performed by: INTERNAL MEDICINE

## 2021-01-15 PROCEDURE — 3017F COLORECTAL CA SCREEN DOC REV: CPT | Performed by: INTERNAL MEDICINE

## 2021-01-15 PROCEDURE — G8756 NO BP MEASURE DOC: HCPCS | Performed by: INTERNAL MEDICINE

## 2021-01-15 PROCEDURE — 1100F PTFALLS ASSESS-DOCD GE2>/YR: CPT | Performed by: INTERNAL MEDICINE

## 2021-01-15 PROCEDURE — G8432 DEP SCR NOT DOC, RNG: HCPCS | Performed by: INTERNAL MEDICINE

## 2021-01-15 PROCEDURE — 3288F FALL RISK ASSESSMENT DOCD: CPT | Performed by: INTERNAL MEDICINE

## 2021-01-15 RX ORDER — CEPHALEXIN 500 MG/1
TABLET ORAL
COMMUNITY
Start: 2021-01-10 | End: 2021-07-06 | Stop reason: ALTCHOICE

## 2021-01-15 RX ORDER — ACETAMINOPHEN AND CODEINE PHOSPHATE 300; 30 MG/1; MG/1
2 TABLET ORAL
COMMUNITY
Start: 2021-01-10 | End: 2021-07-06 | Stop reason: ALTCHOICE

## 2021-01-19 ENCOUNTER — TELEPHONE (OUTPATIENT)
Dept: INTERNAL MEDICINE CLINIC | Age: 76
End: 2021-01-19

## 2021-01-19 NOTE — TELEPHONE ENCOUNTER
Patient calling to make sure we received the notes from [facility] that were faxed to you yesterday. Please let patient know if you did not and she will call them. This is related to the blood clots in her leg.

## 2021-01-25 NOTE — PROGRESS NOTES
Oriana Layne is a 77 y.o. female who was seen by synchronous (real-time) audio-video technology on 1/15/2021. She confirmed that, for purposes of billing, this is a virtual visit with her provider for which we will submit a claim for reimbursement to her insurance company. She is aware that she will be responsible for any copays, coinsurance amounts or other amounts not covered by her insurance company. Do you accept - YES    This visit was completed was completed virtually using [employer] Me        Subjective:   Oriana Layne was seen for ED follow up    The patient had gone to the [facility] ED for complaints of left leg swelling and pain. She had a noted cellulitis in the leg as well as a DVT. She was started on Eliquis and is tolerating the medication. She was also given Keflex for the cellulitis. Her leg has improved. Prior to Admission medications    Medication Sig Start Date End Date Taking? Authorizing Provider   cephalexin 500 mg tab  1/10/21  Yes Provider, Historical   rivaroxaban (XARELTO STARTER HARDEEP) 15 mg (42)- 20 mg (9) DsPk 1 Tab. 1/10/21  Yes Provider, Historical   acetaminophen-codeine (TYLENOL #3) 300-30 mg per tablet 2 Tabs. 1/10/21  Yes Provider, Historical   dilTIAZem ER (CARDIZEM CD) 120 mg capsule TAKE 1 CAPSULE EVERY DAY 10/30/20  Yes Ilene PRYOR NP   albuterol-ipratropium (DUO-NEB) 2.5 mg-0.5 mg/3 ml nebu INHALE 1 VIAL VIA NEBULIZER FOUR TIMES DAILY AS NEEDED 7/4/20  Yes Provider, Historical   cholecalciferol (VITAMIN D3) (2,000 UNITS /50 MCG) cap capsule Take 1 Cap by mouth daily. Yes Provider, Historical   Calcium-Cholecalciferol, D3, 600 mg(1,500mg) -400 unit cap Take 2 Tabs by mouth daily.    Yes Provider, Historical   lamoTRIgine (LaMICtal) 150 mg tablet TAKE 1 TABLET TWICE DAILY 7/17/20  Yes Arnold Carrillo MD   simvastatin (ZOCOR) 20 mg tablet TAKE 1 TABLET EVERY NIGHT 7/17/20  Yes Diana Weston MD   albuterol (VENTOLIN HFA) 90 mcg/actuation inhaler INHALE 1 PUFF EVERY FOUR HOURS AS NEEDED FOR WHEEZING OR SHORTNESS OF BREATH. 11/11/19  Yes Tom Suárez MD   fluticasone/umeclidin/vilanter (TRELEGY ELLIPTA IN) Take 1 Puff by inhalation daily. Yes Provider, Historical   aspirin delayed-release 81 mg tablet Take 81 mg by mouth every fourty-eight (48) hours. Yes Provider, Historical   methocarbamoL (ROBAXIN) 500 mg tablet Patient can take 2 every 8 hours as needed. 11/25/20   Tom Suárez MD   doxycycline (PERIOSTAT) 20 mg tablet TAKE 1 CAPSULE BY MOUTH TWICE DAILY 7/18/19   Provider, Historical   meclizine (ANTIVERT) 25 mg tablet Take 1 Tab by mouth three (3) times daily as needed. Patient taking differently: Take 25 mg by mouth three (3) times daily as needed for Dizziness. 1/10/18   Tom Suárez MD       No Known Allergies    Patient Active Problem List    Diagnosis Date Noted    COPD (chronic obstructive pulmonary disease) ([facility] 07/20/2018    Cryptogenic partial complex epilepsy ([facility] 12/14/2017    Essential tremor 12/14/2017    Cyst of brain 12/14/2016    HTN (hypertension) 11/20/2014    Tachyarrhythmia 11/20/2014    Mitral valve prolapse 11/20/2014    Hyperlipidemia 11/20/2014    Obesity (BMI 30.0-34.9) 11/20/2014     Current Outpatient Medications   Medication Sig Dispense Refill    cephalexin 500 mg tab       rivaroxaban (XARELTO STARTER HARDEEP) 15 mg (42)- 20 mg (9) DsPk 1 Tab.  acetaminophen-codeine (TYLENOL #3) 300-30 mg per tablet 2 Tabs.  dilTIAZem ER (CARDIZEM CD) 120 mg capsule TAKE 1 CAPSULE EVERY DAY 14 Cap 0    albuterol-ipratropium (DUO-NEB) 2.5 mg-0.5 mg/3 ml nebu INHALE 1 VIAL VIA NEBULIZER FOUR TIMES DAILY AS NEEDED      cholecalciferol (VITAMIN D3) (2,000 UNITS /50 MCG) cap capsule Take 1 Cap by mouth daily.  Calcium-Cholecalciferol, D3, 600 mg(1,500mg) -400 unit cap Take 2 Tabs by mouth daily.       lamoTRIgine (LaMICtal) 150 mg tablet TAKE 1 TABLET TWICE DAILY 180 Tab 2    simvastatin (ZOCOR) 20 mg tablet TAKE 1 TABLET EVERY NIGHT 90 Tab 1    albuterol (VENTOLIN HFA) 90 mcg/actuation inhaler INHALE 1 PUFF EVERY FOUR HOURS AS NEEDED FOR WHEEZING OR SHORTNESS OF BREATH. 1 Inhaler 3    fluticasone/umeclidin/vilanter (TRELEGY ELLIPTA IN) Take 1 Puff by inhalation daily.  aspirin delayed-release 81 mg tablet Take 81 mg by mouth every fourty-eight (48) hours.  methocarbamoL (ROBAXIN) 500 mg tablet Patient can take 2 every 8 hours as needed. 30 Tab 0    doxycycline (PERIOSTAT) 20 mg tablet TAKE 1 CAPSULE BY MOUTH TWICE DAILY  2    meclizine (ANTIVERT) 25 mg tablet Take 1 Tab by mouth three (3) times daily as needed.  (Patient taking differently: Take 25 mg by mouth three (3) times daily as needed for Dizziness.) 30 Tab 1     No Known Allergies  Past Medical History:   Diagnosis Date    Chronic obstructive pulmonary disease (HCC)     Hyperlipidemia     Seizures (HCC)      Past Surgical History:   Procedure Laterality Date    HX BREAST LUMPECTOMY      HX  SECTION      HX ORTHOPAEDIC      trigger finger    HX WISDOM TEETH EXTRACTION       Family History   Problem Relation Age of Onset    Heart Disease Father     Stroke Father     Cancer Paternal Aunt         breast    Cancer Maternal Grandmother     Cancer Paternal Grandmother         colon    Cancer Son         colon    Cancer Paternal Aunt         breast     Social History     Tobacco Use    Smoking status: Current Every Day Smoker     Packs/day: 0.50     Years: 50.00     Pack years: 25.00     Types: Cigarettes    Smokeless tobacco: Never Used   Substance Use Topics    Alcohol use: No          ROS - per HPI      Objective:     General: alert, cooperative, no distress   Mental  status: pe mental status_general use: normal mood, behavior, speech, dress, motor activity, and thought processes, able to follow commands   Eyes: EOM intact, normal sclera   Mouth: mucous membranes moist   Neck: no visualized mass Resp: PULM - obs findings: normal effort and no respiratory distress   Neuro: neuro - obs: no gross deficits   Musculoskeletal: normal ROM of neck and normal gait w/o ataxia   Skin: skin exam: no discoloration or lesions of concern on visible areas   Psychiatric: normal affect, no hallucinations           Assessment & Plan:   Diagnoses and all orders for this visit:    1. Acute deep vein thrombosis (DVT) of left lower extremity, unspecified vein (HCC)    2. Left leg cellulitis          Due to this being a TeleHealth evaluation, many elements of the physical examination are unable to be assessed. Pursuant to the emergency declaration under the [address] [place] [address], [facility] waiver authority and the [employer] Act, this Virtual  Visit was conducted, with patient's consent, to reduce the patient's risk of exposure to COVID-19 and provide continuity of care for an established patient. Services were provided through a video synchronous discussion virtually to substitute for in-person clinic visit. We discussed the expected course, resolution and complications of the diagnosis(es) in detail. Medication risks, benefits, costs, interactions, and alternatives were discussed as indicated. I advised her to contact the office if her condition worsens, changes or fails to improve as anticipated. She expressed understanding with the diagnosis(es) and plan.      Thomas Pena MD

## 2021-01-27 DIAGNOSIS — R05.9 COUGH: ICD-10-CM

## 2021-01-27 DIAGNOSIS — E78.2 MIXED HYPERLIPIDEMIA: ICD-10-CM

## 2021-01-27 RX ORDER — ALBUTEROL SULFATE 90 UG/1
AEROSOL, METERED RESPIRATORY (INHALATION)
Qty: 18 G | Refills: 1 | Status: SHIPPED | OUTPATIENT
Start: 2021-01-27

## 2021-01-27 RX ORDER — SIMVASTATIN 20 MG/1
TABLET, FILM COATED ORAL
Qty: 90 TAB | Refills: 1 | Status: SHIPPED | OUTPATIENT
Start: 2021-01-27 | End: 2021-08-16

## 2021-01-28 ENCOUNTER — TELEPHONE (OUTPATIENT)
Dept: INTERNAL MEDICINE CLINIC | Age: 76
End: 2021-01-28

## 2021-01-28 DIAGNOSIS — I82.402 ACUTE DEEP VEIN THROMBOSIS (DVT) OF LEFT LOWER EXTREMITY, UNSPECIFIED VEIN (HCC): Primary | ICD-10-CM

## 2021-01-28 NOTE — TELEPHONE ENCOUNTER
Patient was told to call when she was down to 10 days before running out of the rivaroxaban starter pack she was given in the hospital.  Please [facility] new [facility] [address]

## 2021-02-10 ENCOUNTER — OFFICE VISIT (OUTPATIENT)
Dept: NEUROLOGY | Age: 76
End: 2021-02-10
Payer: MEDICARE

## 2021-02-10 ENCOUNTER — DOCUMENTATION ONLY (OUTPATIENT)
Dept: NEUROLOGY | Age: 76
End: 2021-02-10

## 2021-02-10 VITALS
WEIGHT: 157 LBS | HEIGHT: 59 IN | SYSTOLIC BLOOD PRESSURE: 130 MMHG | DIASTOLIC BLOOD PRESSURE: 78 MMHG | HEART RATE: 79 BPM | OXYGEN SATURATION: 98 % | BODY MASS INDEX: 31.65 KG/M2 | RESPIRATION RATE: 16 BRPM

## 2021-02-10 DIAGNOSIS — R68.89 FORGETFULNESS: ICD-10-CM

## 2021-02-10 DIAGNOSIS — R41.3 SHORT-TERM MEMORY LOSS: Primary | ICD-10-CM

## 2021-02-10 DIAGNOSIS — G93.0 CYST OF BRAIN: ICD-10-CM

## 2021-02-10 DIAGNOSIS — G25.0 ESSENTIAL TREMOR: ICD-10-CM

## 2021-02-10 DIAGNOSIS — R51.9 NONINTRACTABLE EPISODIC HEADACHE, UNSPECIFIED HEADACHE TYPE: ICD-10-CM

## 2021-02-10 PROCEDURE — G8399 PT W/DXA RESULTS DOCUMENT: HCPCS | Performed by: NURSE PRACTITIONER

## 2021-02-10 PROCEDURE — 99214 OFFICE O/P EST MOD 30 MIN: CPT | Performed by: NURSE PRACTITIONER

## 2021-02-10 PROCEDURE — G8536 NO DOC ELDER MAL SCRN: HCPCS | Performed by: NURSE PRACTITIONER

## 2021-02-10 PROCEDURE — G8754 DIAS BP LESS 90: HCPCS | Performed by: NURSE PRACTITIONER

## 2021-02-10 PROCEDURE — G8427 DOCREV CUR MEDS BY ELIG CLIN: HCPCS | Performed by: NURSE PRACTITIONER

## 2021-02-10 PROCEDURE — 1090F PRES/ABSN URINE INCON ASSESS: CPT | Performed by: NURSE PRACTITIONER

## 2021-02-10 PROCEDURE — G8417 CALC BMI ABV UP PARAM F/U: HCPCS | Performed by: NURSE PRACTITIONER

## 2021-02-10 PROCEDURE — G8752 SYS BP LESS 140: HCPCS | Performed by: NURSE PRACTITIONER

## 2021-02-10 PROCEDURE — G8432 DEP SCR NOT DOC, RNG: HCPCS | Performed by: NURSE PRACTITIONER

## 2021-02-10 PROCEDURE — 3017F COLORECTAL CA SCREEN DOC REV: CPT | Performed by: NURSE PRACTITIONER

## 2021-02-10 PROCEDURE — 3288F FALL RISK ASSESSMENT DOCD: CPT | Performed by: NURSE PRACTITIONER

## 2021-02-10 PROCEDURE — 1100F PTFALLS ASSESS-DOCD GE2>/YR: CPT | Performed by: NURSE PRACTITIONER

## 2021-02-10 NOTE — PROGRESS NOTES
Date:  02/10/21     Name:  Rio Funes  :  1945  MRN:  [id number]     PCP:  Oliva Razo MD    Chief Complaint   Patient presents with    Neurologic Problem    Memory Loss    Dizziness       HISTORY OF PRESENT ILLNESS:Follow-up visit for seizures, sharp head pain. She is currently bring maintain on Lamotrigine 150mg TID. Last visit she developed left thigh and some weakness in her left lower extremity. EMG was ordered and results were  Unrevealing. Vertigo  She continues to have intermittent vertigo. Her ENT specialist had placed her on calcium and vitamin D. She has had extensive work-up etiology still unknown. Cognitive impairment  Daughter reports that she is having some short-term memory loss. She will leave food on the stove and do not remember until she smells it. She will have conversations and have difficulty with word finding. She does get confused in familiar places. She had an episode where she did not remember member seeing her son for the day and thought that he had passed away. She also thought that her grandson had a sibling but he did not. Daughter reports that her word finding difficulty started in 2019. This transient alteration of awareness occur . Review of Systems   Constitutional: Negative. HENT: Negative for tinnitus. Eyes: Negative for blurred vision, double vision and photophobia. Respiratory: Negative. Cardiovascular: Negative for chest pain. Neurological: Positive for dizziness and headaches. Negative for sensory change, seizures, loss of consciousness and weakness. Psychiatric/Behavioral: Positive for memory loss. Current Outpatient Medications   Medication Sig    rivaroxaban (Xarelto) 20 mg tab tablet Take 1 Tab by mouth daily.     simvastatin (ZOCOR) 20 mg tablet TAKE 1 TABLET EVERY NIGHT    albuterol (PROVENTIL HFA, VENTOLIN HFA, PROAIR HFA) 90 mcg/actuation inhaler INHALE 1 PUFF EVERY FOUR HOURS AS NEEDED FOR WHEEZING OR SHORTNESS OF BREATH.  dilTIAZem ER (CARDIZEM CD) 120 mg capsule TAKE 1 CAPSULE EVERY DAY    albuterol-ipratropium (DUO-NEB) 2.5 mg-0.5 mg/3 ml nebu INHALE 1 VIAL VIA NEBULIZER FOUR TIMES DAILY AS NEEDED    cholecalciferol (VITAMIN D3) (2,000 UNITS /50 MCG) cap capsule Take 1 Cap by mouth daily.  lamoTRIgine (LaMICtal) 150 mg tablet TAKE 1 TABLET TWICE DAILY    doxycycline (PERIOSTAT) 20 mg tablet TAKE 1 CAPSULE BY MOUTH TWICE DAILY    fluticasone/umeclidin/vilanter (TRELEGY ELLIPTA IN) Take 1 Puff by inhalation daily.  meclizine (ANTIVERT) 25 mg tablet Take 1 Tab by mouth three (3) times daily as needed. (Patient taking differently: Take 25 mg by mouth three (3) times daily as needed for Dizziness.)    cephalexin 500 mg tab     rivaroxaban (XARELTO STARTER HARDEEP) 15 mg (42)- 20 mg (9) DsPk 1 Tab.  acetaminophen-codeine (TYLENOL #3) 300-30 mg per tablet 2 Tabs.  methocarbamoL (ROBAXIN) 500 mg tablet Patient can take 2 every 8 hours as needed.  Calcium-Cholecalciferol, D3, 600 mg(1,500mg) -400 unit cap Take 2 Tabs by mouth daily.  aspirin delayed-release 81 mg tablet Take 81 mg by mouth every fourty-eight (48) hours. No current facility-administered medications for this visit.       No Known Allergies  Past Medical History:   Diagnosis Date    Chronic obstructive pulmonary disease (HCC)     Hyperlipidemia     Seizures (HCC)      Past Surgical History:   Procedure Laterality Date    HX BREAST LUMPECTOMY      HX  SECTION      HX ORTHOPAEDIC      trigger finger    HX WISDOM TEETH EXTRACTION       Social History     Socioeconomic History    Marital status:      Spouse name: Not on file    Number of children: Not on file    Years of education: Not on file    Highest education level: Not on file   Occupational History    Not on file   Social Needs    Financial resource strain: Not on file    Food insecurity     Worry: Not on file Inability: Not on file    Transportation needs     Medical: Not on file     Non-medical: Not on file   Tobacco Use    Smoking status: Current Every Day Smoker     Packs/day: 0.50     Years: 50.00     Pack years: 25.00     Types: Cigarettes    Smokeless tobacco: Never Used   Substance and Sexual Activity    Alcohol use: No    Drug use: No    Sexual activity: Never   Lifestyle    Physical activity     Days per week: Not on file     Minutes per session: Not on file    Stress: Not on file   Relationships    Social connections     Talks on phone: Not on file     Gets together: Not on file     Attends [employer] service: Not on file     Active member of club or organization: Not on file     Attends meetings of clubs or organizations: Not on file     Relationship status: Not on file    Intimate partner violence     Fear of current or ex partner: Not on file     Emotionally abused: Not on file     Physically abused: Not on file     Forced sexual activity: Not on file   Other Topics Concern    Not on file   Social History Narrative    Not on file     Family History   Problem Relation Age of Onset    Heart Disease Father     Stroke Father     Cancer Paternal Aunt         breast    Cancer Maternal Grandmother     Cancer Paternal Grandmother         colon    Cancer Son         colon    Cancer Paternal Aunt         breast       PHYSICAL EXAMINATION:    Visit Vitals  /78   Pulse 79   Resp 16   Ht 4' 11\" (1.499 m)   Wt 157 lb (71.2 kg)   SpO2 98%   BMI 31.71 kg/m²       General: Well developed, well nourished. Patient in no apparent distress   Head: Normocephalic, atraumatic, anicteric sclera   Abd: Bowel sounds were audible. No tenderness on palpation   Ext: No pedal edema   Skin: No overt signs of rash      Neurological Exam:  Mental Status: Alert and oriented to person place and time MMSE 29/30, missed one on recall    Speech: Fluent no aphasia or dysarthria. Cranial Nerves:   Intact visual fields. Facial sensation is normal. Facial movement is symmetric. Palate is midline. Normal sternocleidomastoid strength. Tongue is midline. Hearing is intact bilaterally. Eyes: PERRL, EOM's full, no nystagmus, no ptosis. Motor:  Full and symmetric strength of upper and lower proximal and distal muscles. Normal bulk and tone. Reflexes:   Deep tendon reflexes 2+/4 and symmetrical.  Plantar response is downgoing b/l. Gait:  Gait is balanced and fluid with normal arm swing. Tremor:   No tremor noted. Cerebellar:  Finger to nose  was demonstrated competently. Neurovascular: No carotid bruits. ASSESSMENT AND PLAN    ICD-10-CM ICD-9-CM    1. Short-term memory loss  R41.3 780.93 LAMOTRIGINE (LAMICTAL)      VITAMIN B12      TSH 3RD GENERATION      T4 (THYROXINE)      REFERRAL TO NEUROPSYCHOLOGY      MRI BRAIN WO CONT      EEG   2. Forgetfulness  R68.89 780.99 REFERRAL TO NEUROPSYCHOLOGY      MRI BRAIN WO CONT      EEG   3. Essential tremor  G25.0 333.1 MRI BRAIN WO CONT   4. Cyst of brain  G93.0 348.0 REFERRAL TO NEUROPSYCHOLOGY      MRI BRAIN WO CONT   5. Nonintractable episodic headache, unspecified headache type  R51.9 784.0 MRI BRAIN WO CONT   15-year-old female with cryptogenic partial seizures. She is currently being maintained on Lamictal 50 mg twice daily. Last Lamictal test 4.3. We will recheck levels. She presented today with new complaints of cognitive impairment. Mini-Mental exam revealed a 29 out of 30 however we will proceed with an evaluation. Her  differential diagnosis being dementing type process such as Alzheimer's versus age-related memory decline versus mild cognitive impairment versus emotional versus attention versus anatomic versus metabolic versus other. She will do and MRI of the brain looking for anatomic abnormality in the frontal or temporal lobes which could be responsible for simple word finding. We will get an EEG to evaluate given her history of seizures.   We will also get a formal neuropsychological evaluation. Follow after  testing. 1. Short-term memory loss  - LAMOTRIGINE (LAMICTAL); Future  - VITAMIN B12; Future  - TSH 3RD GENERATION; Future  - T4 (THYROXINE); Future  - REFERRAL TO NEUROPSYCHOLOGY  - MRI BRAIN WO CONT; Future  - EEG; Future    2. Forgetfulness  - REFERRAL TO NEUROPSYCHOLOGY  - MRI BRAIN WO CONT; Future  - EEG; Future     Cyst of brain  - REFERRAL TO NEUROPSYCHOLOGY  - MRI BRAIN WO CONT; Future   Nonintractable episodic headache, unspecified headache type  - MRI BRAIN WO CONT; Future         This note will not be viewable in [facility].   [place] Thorne, DEVAN

## 2021-02-10 NOTE — PROGRESS NOTES
Ms. Patricia Uriostegui presents today to follow up seizures, memory loss and dizziness. Depression screening done on this patient.

## 2021-02-10 NOTE — PATIENT INSTRUCTIONS
[facility]   Statement to Patients  April 1, 2014      In an effort to ensure the large volume of patient prescription refills is processed in the most efficient and expeditious manner, we are asking our patients to assist us by calling your Pharmacy for all prescription refills, this will include also your  Mail Order Pharmacy. The pharmacy will contact our office electronically to continue the refill process. Please do not wait until the last minute to call your pharmacy. We need at least 48 hours (2days) to fill prescriptions. We also encourage you to call your pharmacy before going to  your prescription to make sure it is ready. With regard to controlled substance prescription refill requests (narcotic refills) that need to be picked up at our office, we ask your cooperation by providing us with at least 72 hours (3days) notice that you will need a refill. We will not refill narcotic prescription refill requests after 4:00pm on any weekday, Monday through Thursday, or after 2:00pm on Fridays, or on the weekends. We encourage everyone to explore another way of getting your prescription refill request processed using [employer], our patient web portal through our electronic medical record system. [employer] is an efficient and effective way to communicate your medication request directly to the office and  downloadable as an rene on your smart phone . [employer] also features a review functionality that allows you to view your medication list as well as leave messages for your physician. Are you ready to get connected? If so please review the attatched instructions or speak to any of our staff to get you set up right away! Thank you so much for your cooperation. Should you have any questions please contact our Practice Administrator.     The Physicians and Staff,  [address] [place] [address] Neurology Clinic

## 2021-02-15 ENCOUNTER — OFFICE VISIT (OUTPATIENT)
Dept: INTERNAL MEDICINE CLINIC | Age: 76
End: 2021-02-15
Payer: MEDICARE

## 2021-02-15 VITALS
HEART RATE: 86 BPM | SYSTOLIC BLOOD PRESSURE: 130 MMHG | WEIGHT: 159.2 LBS | DIASTOLIC BLOOD PRESSURE: 70 MMHG | BODY MASS INDEX: 32.09 KG/M2 | RESPIRATION RATE: 16 BRPM | HEIGHT: 59 IN | OXYGEN SATURATION: 96 % | TEMPERATURE: 97.6 F

## 2021-02-15 DIAGNOSIS — I82.402 ACUTE DEEP VEIN THROMBOSIS (DVT) OF LEFT LOWER EXTREMITY, UNSPECIFIED VEIN (HCC): Primary | ICD-10-CM

## 2021-02-15 DIAGNOSIS — I10 ESSENTIAL HYPERTENSION: ICD-10-CM

## 2021-02-15 DIAGNOSIS — R05.9 COUGH: ICD-10-CM

## 2021-02-15 DIAGNOSIS — J43.8 OTHER EMPHYSEMA (HCC): ICD-10-CM

## 2021-02-15 DIAGNOSIS — E78.2 MIXED HYPERLIPIDEMIA: ICD-10-CM

## 2021-02-15 PROCEDURE — 1090F PRES/ABSN URINE INCON ASSESS: CPT | Performed by: INTERNAL MEDICINE

## 2021-02-15 PROCEDURE — 3017F COLORECTAL CA SCREEN DOC REV: CPT | Performed by: INTERNAL MEDICINE

## 2021-02-15 PROCEDURE — G8417 CALC BMI ABV UP PARAM F/U: HCPCS | Performed by: INTERNAL MEDICINE

## 2021-02-15 PROCEDURE — G8510 SCR DEP NEG, NO PLAN REQD: HCPCS | Performed by: INTERNAL MEDICINE

## 2021-02-15 PROCEDURE — 99214 OFFICE O/P EST MOD 30 MIN: CPT | Performed by: INTERNAL MEDICINE

## 2021-02-15 PROCEDURE — G8427 DOCREV CUR MEDS BY ELIG CLIN: HCPCS | Performed by: INTERNAL MEDICINE

## 2021-02-15 PROCEDURE — 1100F PTFALLS ASSESS-DOCD GE2>/YR: CPT | Performed by: INTERNAL MEDICINE

## 2021-02-15 PROCEDURE — G8752 SYS BP LESS 140: HCPCS | Performed by: INTERNAL MEDICINE

## 2021-02-15 PROCEDURE — G8399 PT W/DXA RESULTS DOCUMENT: HCPCS | Performed by: INTERNAL MEDICINE

## 2021-02-15 PROCEDURE — G8754 DIAS BP LESS 90: HCPCS | Performed by: INTERNAL MEDICINE

## 2021-02-15 PROCEDURE — 3288F FALL RISK ASSESSMENT DOCD: CPT | Performed by: INTERNAL MEDICINE

## 2021-02-15 PROCEDURE — G8536 NO DOC ELDER MAL SCRN: HCPCS | Performed by: INTERNAL MEDICINE

## 2021-02-15 NOTE — PROGRESS NOTES
Chief Complaint   Patient presents with   [facility] Follow Up     [facility] 01-      Reviewed record in preparation for visit and have obtained necessary documentation. Identified pt with two pt identifiers(name and ).       Health Maintenance Due   Topic    COVID-19 Vaccine (1 of 2)    Shingrix Vaccine Age 49> (1 of 2)    Breast Cancer Screen Mammogram     Pneumococcal 65+ years (2 of 2 - PPSV23)    Flu Vaccine (1)    Colorectal Cancer Screening Combo     GLAUCOMA SCREENING Q2Y          Chief Complaint   Patient presents with   [facility] Follow Up     [facility] 01-         Wt Readings from Last 3 Encounters:   02/15/21 159 lb 3.2 oz (72.2 kg)   02/10/21 157 lb (71.2 kg)   10/08/20 157 lb (71.2 kg)     Temp Readings from Last 3 Encounters:   02/15/21 97.6 °F (36.4 °C) (Oral)   10/08/20 97.8 °F (36.6 °C) (Temporal)   20 97.3 °F (36.3 °C) (Temporal)     BP Readings from Last 3 Encounters:   02/15/21 130/70   02/10/21 130/78   10/08/20 130/80     Pulse Readings from Last 3 Encounters:   02/15/21 86   02/10/21 79   10/08/20 78           Learning Assessment:  :     Learning Assessment 2/10/2021 2019 2017 2017 2015 2014   PRIMARY LEARNER Patient Patient Patient Patient Patient Patient   HIGHEST LEVEL OF EDUCATION - PRIMARY LEARNER  - - - - - GRADUATED HIGH SCHOOL OR GED   BARRIERS PRIMARY LEARNER - - - - - NONE   CO-LEARNER CAREGIVER - - - - - No   PRIMARY LANGUAGE ENGLISH ENGLISH ENGLISH ENGLISH ENGLISH ENGLISH   LEARNER PREFERENCE PRIMARY LISTENING DEMONSTRATION READING READING READING READING   ANSWERED BY patient self patient patient patient patient   RELATIONSHIP SELF SELF SELF SELF SELF SELF       Depression Screening:  :     3 most recent PHQ Screens 2/15/2021   Little interest or pleasure in doing things Not at all   Feeling down, depressed, irritable, or hopeless Not at all   Total Score PHQ 2 0       Fall Risk Assessment:  :     Fall Risk Assessment, last 12 mths 2/15/2021   Able to walk? Yes   Fall in past 12 months? 1   Do you feel unsteady? 0   Are you worried about falling 0   Is TUG test greater than 12 seconds? 0   Number of falls in past 12 months -   Fall with injury? -       Abuse Screening:  :     Abuse Screening Questionnaire 10/22/2018 7/20/2018 10/10/2017   Do you ever feel afraid of your partner? N N N   Are you in a relationship with someone who physically or mentally threatens you? N N N   Is it safe for you to go home? Roxane Ernandez       Coordination of Care Questionnaire:  :     1) Have you been to an emergency room, urgent care clinic since your last visit? yes   Hospitalized since your last visit? no             2) Have you seen or consulted any other health care providers outside of [address] [place] [address] since your last visit? yes  (Include any pap smears or colon screenings in this section.)    3) Do you have an Advance Directive on file? no    4) Are you interested in receiving information on Advance Directives? NO      Patient is accompanied by daughter I have received verbal consent from Víctor Kraft to discuss any/all medical information while they are present in the room. Reviewed record  In preparation for visit and have obtained necessary documentation.

## 2021-02-16 LAB
LAMOTRIGINE SERPL-MCNC: 10.3 UG/ML (ref 2–20)
T4 SERPL-MCNC: 8.4 UG/DL (ref 4.5–12)
TSH SERPL DL<=0.005 MIU/L-ACNC: 0.65 UIU/ML (ref 0.45–4.5)
VIT B12 SERPL-MCNC: 406 PG/ML (ref 232–1245)

## 2021-02-17 NOTE — PROGRESS NOTES
Follow Up Visit    Mary Pabon is a 77 y.o. female. she presents for Hospital Follow Up ([facility] 01- )    The patient presents for follow up today. She has continued her Xarelto for treatment of DVT found at her recent hospitalization. She is feeling well and has had no difficulty with the medication. She otherwise has had ongoing COPD but notes that symptoms are fairly well controlled. She has had follow up with pulmonary and will continue present inhalers. She has seen neurology for her partial seizures and short term memory loss. She will follow up with neuropsychology. A brain MRI is also scheduled. Patient Active Problem List   Diagnosis Code    HTN (hypertension) I10    Tachyarrhythmia R00.0    Mitral valve prolapse I34.1    Hyperlipidemia E78.5    Obesity (BMI 30.0-34. 9) E66.9    Cyst of brain G93.0    Cryptogenic partial complex epilepsy ([facility] [address] G40.209    Essential tremor G25.0    COPD (chronic obstructive pulmonary disease) ([facility]) J44.9         Prior to Admission medications    Medication Sig Start Date End Date Taking? Authorizing Provider   rivaroxaban (Xarelto) 20 mg tab tablet Take 1 Tab by mouth daily. 1/28/21  Yes Ralf Richmond MD   simvastatin (ZOCOR) 20 mg tablet TAKE 1 TABLET EVERY NIGHT 1/27/21  Yes Ralf Richmond MD   albuterol (PROVENTIL HFA, VENTOLIN HFA, PROAIR HFA) 90 mcg/actuation inhaler INHALE 1 PUFF EVERY FOUR HOURS AS NEEDED FOR WHEEZING OR SHORTNESS OF BREATH. 1/27/21  Yes Ralf Richmond MD   dilTIAZem ER (CARDIZEM CD) 120 mg capsule TAKE 1 CAPSULE EVERY DAY 10/30/20  Yes Cindy PRYOR NP   albuterol-ipratropium (DUO-NEB) 2.5 mg-0.5 mg/3 ml nebu INHALE 1 VIAL VIA NEBULIZER FOUR TIMES DAILY AS NEEDED 7/4/20  Yes Provider, Historical   cholecalciferol (VITAMIN D3) (2,000 UNITS /50 MCG) cap capsule Take 1 Cap by mouth daily.    Yes Provider, Historical   Calcium-Cholecalciferol, D3, 600 mg(1,500mg) -400 unit cap Take 2 Tabs by mouth daily. Yes Provider, Historical   lamoTRIgine (LaMICtal) 150 mg tablet TAKE 1 TABLET TWICE DAILY 7/17/20  Yes Roman Alexander MD   fluticasone/umeclidin/vilanter (TRELEGY ELLIPTA IN) Take 1 Puff by inhalation daily. Yes Provider, Historical   aspirin delayed-release 81 mg tablet Take 81 mg by mouth every fourty-eight (48) hours. Yes Provider, Historical   meclizine (ANTIVERT) 25 mg tablet Take 1 Tab by mouth three (3) times daily as needed. Patient taking differently: Take 25 mg by mouth three (3) times daily as needed for Dizziness. 1/10/18  Yes Uche Jacobo MD   cephalexin 500 mg tab  1/10/21   Provider, Historical   rivaroxaban (XARELTO STARTER HARDEEP) 15 mg (42)- 20 mg (9) DsPk 1 Tab. 1/10/21   Provider, Historical   acetaminophen-codeine (TYLENOL #3) 300-30 mg per tablet 2 Tabs. 1/10/21   Provider, Historical   methocarbamoL (ROBAXIN) 500 mg tablet Patient can take 2 every 8 hours as needed. 11/25/20   Uche Jacobo MD   doxycycline (PERIOSTAT) 20 mg tablet TAKE 1 CAPSULE BY MOUTH TWICE DAILY 7/18/19   Provider, Historical         Health Maintenance   Topic Date Due    COVID-19 Vaccine (1 of 2) 11/21/1962    Shingrix Vaccine Age 50> (1 of 2) 11/21/1996    Breast Cancer Screen Mammogram  03/31/2018    Pneumococcal 65+ years (2 of 2 - PPSV23) 10/22/2019    Flu Vaccine (1) 09/01/2020    Colorectal Cancer Screening Combo  01/02/2021    GLAUCOMA SCREENING Q2Y  01/23/2021    Medicare Yearly Exam  10/09/2021    Lipid Screen  10/10/2021    DTaP/Tdap/Td series (2 - Td) 11/17/2024    Hepatitis C Screening  Completed    Bone Densitometry (Dexa) Screening  Completed       Review of Systems   Constitutional: Negative. Respiratory: Negative. Cardiovascular: Negative. Genitourinary: Negative. Psychiatric/Behavioral: Positive for memory loss.            Visit Vitals  /70 (BP 1 Location: Right arm, BP Patient Position: Sitting, BP Cuff Size: Small adult)   Pulse 86   Temp 97.6 °F (36.4 °C) (Oral)   Resp 16   Ht 4' 11\" (1.499 m)   Wt 159 lb 3.2 oz (72.2 kg)   SpO2 96%   BMI 32.15 kg/m²       Physical Exam  Constitutional:       Appearance: She is well-developed. Cardiovascular:      Rate and Rhythm: Normal rate and regular rhythm. Pulmonary:      Effort: Pulmonary effort is normal.      Breath sounds: Normal breath sounds. ASSESSMENT/PLAN    Diagnoses and all orders for this visit:    1. Acute deep vein thrombosis (DVT) of left lower extremity, unspecified vein (HCC)    2. Cough    3. Mixed hyperlipidemia    4. Essential hypertension    5. Other emphysema ([facility] [address]        Follow-up and Dispositions    · Return in about 3 months (around 5/15/2021).

## 2021-02-23 ENCOUNTER — HOSPITAL ENCOUNTER (OUTPATIENT)
Dept: MRI IMAGING | Age: 76
Discharge: HOME | End: 2021-02-23
Payer: MEDICARE

## 2021-02-23 ENCOUNTER — HOSPITAL ENCOUNTER (OUTPATIENT)
Dept: NEUROLOGY | Age: 76
Discharge: HOME | End: 2021-02-23
Payer: MEDICARE

## 2021-02-23 DIAGNOSIS — G93.0 CYST OF BRAIN: ICD-10-CM

## 2021-02-23 DIAGNOSIS — R41.3 SHORT-TERM MEMORY LOSS: ICD-10-CM

## 2021-02-23 DIAGNOSIS — R51.9 NONINTRACTABLE EPISODIC HEADACHE, UNSPECIFIED HEADACHE TYPE: ICD-10-CM

## 2021-02-23 DIAGNOSIS — G25.0 ESSENTIAL TREMOR: ICD-10-CM

## 2021-02-23 DIAGNOSIS — R68.89 FORGETFULNESS: ICD-10-CM

## 2021-02-23 PROCEDURE — 70551 MRI BRAIN STEM W/O DYE: CPT

## 2021-02-23 PROCEDURE — 95816 EEG AWAKE AND DROWSY: CPT

## 2021-02-24 NOTE — PROCEDURES
[facility]  EEG    Name:  Santiago Cabrera  MR#:  [id number]  :  1945  ACCOUNT #:  [de-identified]  DATE OF SERVICE:  2021      EEG NUMBER:  [id number]    REASON FOR THE TEST:  Memory loss, temporal lobe seizures, currently on lamotrigine. This is a waking and drowsy state EEG. Prolonged sleep did not occur. There was 9 to 10 per second posterior alpha activity mixed with low voltage fast and muscle activity. The alpha rhythm disappears during drowsiness, but stage II sleep does not occur. No focal changes or seizure disorders were noted. Photic stimulation produced no driving. Hyperventilation was not done. EKG showed sinus rhythm at the rate of 78. INTERPRETATION:  This is a normal waking and drowsy state EEG.       MD RYLEE Del [email or web address]  D:  2021 13:34  T:  2021 0:55  JOB #:  [id number]

## 2021-04-07 ENCOUNTER — OFFICE VISIT (OUTPATIENT)
Dept: NEUROLOGY | Age: 76
End: 2021-04-07
Payer: MEDICARE

## 2021-04-07 VITALS
WEIGHT: 160 LBS | HEIGHT: 59 IN | SYSTOLIC BLOOD PRESSURE: 132 MMHG | OXYGEN SATURATION: 97 % | BODY MASS INDEX: 32.25 KG/M2 | HEART RATE: 86 BPM | DIASTOLIC BLOOD PRESSURE: 84 MMHG

## 2021-04-07 DIAGNOSIS — R41.3 SHORT-TERM MEMORY LOSS: Primary | ICD-10-CM

## 2021-04-07 DIAGNOSIS — R68.89 FORGETFULNESS: ICD-10-CM

## 2021-04-07 PROCEDURE — 3288F FALL RISK ASSESSMENT DOCD: CPT | Performed by: NURSE PRACTITIONER

## 2021-04-07 PROCEDURE — G8536 NO DOC ELDER MAL SCRN: HCPCS | Performed by: NURSE PRACTITIONER

## 2021-04-07 PROCEDURE — G8752 SYS BP LESS 140: HCPCS | Performed by: NURSE PRACTITIONER

## 2021-04-07 PROCEDURE — 3017F COLORECTAL CA SCREEN DOC REV: CPT | Performed by: NURSE PRACTITIONER

## 2021-04-07 PROCEDURE — 1100F PTFALLS ASSESS-DOCD GE2>/YR: CPT | Performed by: NURSE PRACTITIONER

## 2021-04-07 PROCEDURE — G8399 PT W/DXA RESULTS DOCUMENT: HCPCS | Performed by: NURSE PRACTITIONER

## 2021-04-07 PROCEDURE — 1090F PRES/ABSN URINE INCON ASSESS: CPT | Performed by: NURSE PRACTITIONER

## 2021-04-07 PROCEDURE — G8432 DEP SCR NOT DOC, RNG: HCPCS | Performed by: NURSE PRACTITIONER

## 2021-04-07 PROCEDURE — G8427 DOCREV CUR MEDS BY ELIG CLIN: HCPCS | Performed by: NURSE PRACTITIONER

## 2021-04-07 PROCEDURE — G8417 CALC BMI ABV UP PARAM F/U: HCPCS | Performed by: NURSE PRACTITIONER

## 2021-04-07 PROCEDURE — 99213 OFFICE O/P EST LOW 20 MIN: CPT | Performed by: NURSE PRACTITIONER

## 2021-04-07 PROCEDURE — G8754 DIAS BP LESS 90: HCPCS | Performed by: NURSE PRACTITIONER

## 2021-04-07 RX ORDER — BUDESONIDE AND FORMOTEROL FUMARATE DIHYDRATE 80; 4.5 UG/1; UG/1
AEROSOL RESPIRATORY (INHALATION)
COMMUNITY
End: 2021-07-06 | Stop reason: ALTCHOICE

## 2021-04-07 RX ORDER — CIPROFLOXACIN 500 MG/1
TABLET ORAL
COMMUNITY
End: 2021-07-06 | Stop reason: ALTCHOICE

## 2021-04-07 RX ORDER — AZITHROMYCIN 250 MG/1
250 TABLET, FILM COATED ORAL
COMMUNITY
End: 2021-07-06 | Stop reason: ALTCHOICE

## 2021-04-07 RX ORDER — CALCIUM CARBONATE 600 MG
1200 TABLET ORAL
COMMUNITY

## 2021-04-07 RX ORDER — CHLORHEXIDINE GLUCONATE 1.2 MG/ML
RINSE ORAL
COMMUNITY
End: 2021-07-06 | Stop reason: ALTCHOICE

## 2021-04-07 RX ORDER — MONTELUKAST SODIUM 10 MG/1
TABLET ORAL
COMMUNITY
Start: 2021-03-26 | End: 2021-07-06 | Stop reason: ALTCHOICE

## 2021-04-07 NOTE — PROGRESS NOTES
Chief Complaint   Patient presents with    Follow-up     here to discuss EEG and MRI results.      Visit Vitals  /84 (BP 1 Location: Right upper arm, BP Patient Position: Sitting)   Pulse 86   Ht 4' 11\" (1.499 m)   Wt 160 lb (72.6 kg)   SpO2 97%   BMI 32.32 kg/m²

## 2021-04-07 NOTE — PROGRESS NOTES
Date:  21     Name:  Jarod Barrios  :  1945  MRN:  [id number]     PCP:  Anjel Valencia MD    Chief Complaint   Patient presents with    Follow-up     here to discuss EEG and MRI results. HISTORY OF PRESENT ILLNESS:Follow up visit for test results   MRI of the brain No interval change since the previous MRI brain of May 2018. Right temporal lobe cystic lesion unchanged. No acute intracranial abnormality. Lab work unrevealing . This was discussed with the patient is she verbalized understanding. She has not made her appointment with Dr. Fer Malagon, but places to do so soon. Recap   Follow-up visit for seizures, sharp head pain.  She is currently bring maintain on Lamotrigine 150mg TID. Last visit she developed left thigh and some weakness in her left lower extremity. EMG was ordered and results were  Unrevealing. Vertigo  She continues to have intermittent vertigo. Her ENT specialist had placed her on calcium and vitamin D. She has had extensive work-up etiology still unknown. Cognitive impairment  Daughter reports that she is having some short-term memory loss. She will leave food on the stove and do not remember until she smells it. She will have conversations and have difficulty with word finding. She does get confused in familiar places. She had an episode where she did not remember member seeing her son for the day and thought that he had passed away. She also thought that her grandson had a sibling but he did not. Daughter reports that her word finding difficulty started in 2019. This transient alteration of awareness occur .              Current Outpatient Medications   Medication Sig    azithromycin (ZITHROMAX) 250 mg tablet 250 mg.    budesonide-formoteroL (SYMBICORT) 80-4.5 mcg/actuation HFAA TWICE A DAY    calcium carbonate (CALTREX) 600 mg calcium (1,500 mg) tablet 1,200 mg.     chlorhexidine (PERIDEX) 0.12 % solution chlorhexidine gluconate 0.12 % mouthwash    ciprofloxacin HCl (CIPRO) 500 mg tablet ciprofloxacin 500 mg tablet    montelukast (SINGULAIR) 10 mg tablet TAKE 1 TABLET BY MOUTH EVERY DAY    rivaroxaban (Xarelto) 20 mg tab tablet Take 1 Tab by mouth daily.  simvastatin (ZOCOR) 20 mg tablet TAKE 1 TABLET EVERY NIGHT    albuterol (PROVENTIL HFA, VENTOLIN HFA, PROAIR HFA) 90 mcg/actuation inhaler INHALE 1 PUFF EVERY FOUR HOURS AS NEEDED FOR WHEEZING OR SHORTNESS OF BREATH.  cephalexin 500 mg tab     rivaroxaban (XARELTO STARTER HARDEEP) 15 mg (42)- 20 mg (9) DsPk 1 Tab.  acetaminophen-codeine (TYLENOL #3) 300-30 mg per tablet 2 Tabs.  methocarbamoL (ROBAXIN) 500 mg tablet Patient can take 2 every 8 hours as needed.  dilTIAZem ER (CARDIZEM CD) 120 mg capsule TAKE 1 CAPSULE EVERY DAY    albuterol-ipratropium (DUO-NEB) 2.5 mg-0.5 mg/3 ml nebu INHALE 1 VIAL VIA NEBULIZER FOUR TIMES DAILY AS NEEDED    cholecalciferol (VITAMIN D3) (2,000 UNITS /50 MCG) cap capsule Take 1 Cap by mouth daily.  Calcium-Cholecalciferol, D3, 600 mg(1,500mg) -400 unit cap Take 2 Tabs by mouth daily.  lamoTRIgine (LaMICtal) 150 mg tablet TAKE 1 TABLET TWICE DAILY    doxycycline (PERIOSTAT) 20 mg tablet TAKE 1 CAPSULE BY MOUTH TWICE DAILY    fluticasone/umeclidin/vilanter (TRELEGY ELLIPTA IN) Take 1 Puff by inhalation daily.  aspirin delayed-release 81 mg tablet Take 81 mg by mouth every fourty-eight (48) hours.  meclizine (ANTIVERT) 25 mg tablet Take 1 Tab by mouth three (3) times daily as needed. (Patient taking differently: Take 25 mg by mouth three (3) times daily as needed for Dizziness.)     No current facility-administered medications for this visit.       No Known Allergies  Past Medical History:   Diagnosis Date    Chronic obstructive pulmonary disease (HCC)     Hyperlipidemia     Seizures ([address]      Past Surgical History:   Procedure Laterality Date    HX BREAST LUMPECTOMY      HX  SECTION      HX ORTHOPAEDIC trigger finger    HX WISDOM TEETH EXTRACTION       Social History     Socioeconomic History    Marital status:      Spouse name: Not on file    Number of children: Not on file    Years of education: Not on file    Highest education level: Not on file   Occupational History    Not on file   Social Needs    Financial resource strain: Not on file    Food insecurity     Worry: Not on file     Inability: Not on file    Transportation needs     Medical: Not on file     Non-medical: Not on file   Tobacco Use    Smoking status: Current Every Day Smoker     Packs/day: 0.50     Years: 50.00     Pack years: 25.00     Types: Cigarettes    Smokeless tobacco: Never Used   Substance and Sexual Activity    Alcohol use: No    Drug use: No    Sexual activity: Never   Lifestyle    Physical activity     Days per week: Not on file     Minutes per session: Not on file    Stress: Not on file   Relationships    Social connections     Talks on phone: Not on file     Gets together: Not on file     Attends Yazidism service: Not on file     Active member of club or organization: Not on file     Attends meetings of clubs or organizations: Not on file     Relationship status: Not on file    Intimate partner violence     Fear of current or ex partner: Not on file     Emotionally abused: Not on file     Physically abused: Not on file     Forced sexual activity: Not on file   Other Topics Concern    Not on file   Social History Narrative    Not on file     Family History   Problem Relation Age of Onset    Heart Disease Father     Stroke Father     Cancer Paternal Aunt         breast    Cancer Maternal Grandmother     Cancer Paternal Grandmother         colon    Cancer Son         colon    Cancer Paternal Aunt         breast         ASSESSMENT AND PLAN    ICD-10-CM ICD-9-CM    1. Short-term memory loss  R41.3 780.93    2.  Forgetfulness  R68.89 780.99      This this appointment was to discuss lab work and imaging. We discussed the importance of getting neuropsych evaluation. Recommend she follows up after her evaluation has been completed. MRI imaging and lab work was discussed and patient verbalized understanding.      Estela Xavier NP

## 2021-04-19 ENCOUNTER — OFFICE VISIT (OUTPATIENT)
Dept: INTERNAL MEDICINE CLINIC | Age: 76
End: 2021-04-19
Payer: MEDICARE

## 2021-04-19 VITALS
WEIGHT: 160 LBS | RESPIRATION RATE: 16 BRPM | SYSTOLIC BLOOD PRESSURE: 130 MMHG | TEMPERATURE: 97.1 F | DIASTOLIC BLOOD PRESSURE: 80 MMHG | HEART RATE: 74 BPM | OXYGEN SATURATION: 99 % | BODY MASS INDEX: 32.25 KG/M2 | HEIGHT: 59 IN

## 2021-04-19 DIAGNOSIS — G25.0 ESSENTIAL TREMOR: ICD-10-CM

## 2021-04-19 DIAGNOSIS — E78.2 MIXED HYPERLIPIDEMIA: ICD-10-CM

## 2021-04-19 DIAGNOSIS — J43.8 OTHER EMPHYSEMA (HCC): ICD-10-CM

## 2021-04-19 DIAGNOSIS — I10 ESSENTIAL HYPERTENSION: Primary | ICD-10-CM

## 2021-04-19 PROCEDURE — 99214 OFFICE O/P EST MOD 30 MIN: CPT | Performed by: INTERNAL MEDICINE

## 2021-04-19 PROCEDURE — G8399 PT W/DXA RESULTS DOCUMENT: HCPCS | Performed by: INTERNAL MEDICINE

## 2021-04-19 PROCEDURE — G8754 DIAS BP LESS 90: HCPCS | Performed by: INTERNAL MEDICINE

## 2021-04-19 PROCEDURE — G8752 SYS BP LESS 140: HCPCS | Performed by: INTERNAL MEDICINE

## 2021-04-19 PROCEDURE — 3288F FALL RISK ASSESSMENT DOCD: CPT | Performed by: INTERNAL MEDICINE

## 2021-04-19 PROCEDURE — 3017F COLORECTAL CA SCREEN DOC REV: CPT | Performed by: INTERNAL MEDICINE

## 2021-04-19 PROCEDURE — G8417 CALC BMI ABV UP PARAM F/U: HCPCS | Performed by: INTERNAL MEDICINE

## 2021-04-19 PROCEDURE — G8536 NO DOC ELDER MAL SCRN: HCPCS | Performed by: INTERNAL MEDICINE

## 2021-04-19 PROCEDURE — G8427 DOCREV CUR MEDS BY ELIG CLIN: HCPCS | Performed by: INTERNAL MEDICINE

## 2021-04-19 PROCEDURE — 1090F PRES/ABSN URINE INCON ASSESS: CPT | Performed by: INTERNAL MEDICINE

## 2021-04-19 PROCEDURE — 1100F PTFALLS ASSESS-DOCD GE2>/YR: CPT | Performed by: INTERNAL MEDICINE

## 2021-04-19 PROCEDURE — G8432 DEP SCR NOT DOC, RNG: HCPCS | Performed by: INTERNAL MEDICINE

## 2021-04-19 NOTE — PROGRESS NOTES
Chief Complaint   Patient presents with    Medication Evaluation     blood thinner     Reviewed record in preparation for visit and have obtained necessary documentation. Identified pt with two pt identifiers(name and ). Health Maintenance Due   Topic    COVID-19 Vaccine (1)    Shingrix Vaccine Age 49> (1 of 2)    Breast Cancer Screen Mammogram     Pneumococcal 65+ years (2 of 2 - PPSV23)    Colorectal Cancer Screening Combo          Chief Complaint   Patient presents with    Medication Evaluation     blood thinner        Wt Readings from Last 3 Encounters:   21 160 lb (72.6 kg)   21 160 lb (72.6 kg)   02/15/21 159 lb 3.2 oz (72.2 kg)     Temp Readings from Last 3 Encounters:   21 97.1 °F (36.2 °C) (Temporal)   02/15/21 97.6 °F (36.4 °C) (Oral)   10/08/20 97.8 °F (36.6 °C) (Temporal)     BP Readings from Last 3 Encounters:   21 130/80   21 132/84   02/15/21 130/70     Pulse Readings from Last 3 Encounters:   21 74   21 86   02/15/21 86           Learning Assessment:  :     Learning Assessment 2/10/2021 2019 2017 2017 2015 2014   PRIMARY LEARNER Patient Patient Patient Patient Patient Patient   HIGHEST LEVEL OF EDUCATION - PRIMARY LEARNER  - - - - - GRADUATED HIGH SCHOOL OR GED   BARRIERS PRIMARY LEARNER - - - - - NONE   CO-LEARNER CAREGIVER - - - - - No   PRIMARY LANGUAGE ENGLISH ENGLISH ENGLISH ENGLISH ENGLISH ENGLISH   LEARNER PREFERENCE PRIMARY LISTENING DEMONSTRATION READING READING READING READING   ANSWERED BY patient self patient patient patient patient   RELATIONSHIP SELF SELF SELF SELF SELF SELF       Depression Screening:  :     3 most recent PHQ Screens 2021   Little interest or pleasure in doing things Not at all   Feeling down, depressed, irritable, or hopeless Not at all   Total Score PHQ 2 0       Fall Risk Assessment:  :     Fall Risk Assessment, last 12 mths 2021   Able to walk?  Yes   Fall in past 15 months? 1   Do you feel unsteady? 0   Are you worried about falling 1   Is TUG test greater than 12 seconds? -   Number of falls in past 12 months 2   Fall with injury? 1       Abuse Screening:  :     Abuse Screening Questionnaire 10/22/2018 7/20/2018 10/10/2017   Do you ever feel afraid of your partner? N N N   Are you in a relationship with someone who physically or mentally threatens you? N N N   Is it safe for you to go home? Y Y Y       Coordination of Care Questionnaire:  :     1) Have you been to an emergency room, urgent care clinic since your last visit? no   Hospitalized since your last visit? no             2) Have you seen or consulted any other health care providers outside of [address] [place] [address] since your last visit? no  (Include any pap smears or colon screenings in this section.)    3) Do you have an Advance Directive on file? no    4) Are you interested in receiving information on Advance Directives? NO      Patient is accompanied by self I have received verbal consent from Imelda Nolan to discuss any/all medical information while they are present in the room. Reviewed record  In preparation for visit and have obtained necessary documentation.

## 2021-04-19 NOTE — PROGRESS NOTES
Follow Up Visit    Nicki Barron is a 77 y.o. female. she presents for Medication Evaluation (blood thinner)    She has completed 3 months of Xarelto. Advised that she could stop this for now. She feels her fatigue is due to this medication. Will monitor off the medication. She has seen ENT for balance issues. Will continue follow up for this. She will continue to see Pulmonary    Planned Neuropsych evaluation for cognitive decline diagnosis. (will make an appt). Patient Active Problem List   Diagnosis Code    HTN (hypertension) I10    Tachyarrhythmia R00.0    Mitral valve prolapse I34.1    Hyperlipidemia E78.5    Obesity (BMI 30.0-34. 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 Authorizing Provider   azithromycin (ZITHROMAX) 250 mg tablet 250 mg. Yes Provider, Historical   montelukast (SINGULAIR) 10 mg tablet TAKE 1 TABLET BY MOUTH EVERY DAY 3/26/21  Yes Provider, Historical   rivaroxaban (Xarelto) 20 mg tab tablet Take 1 Tab by mouth daily. 1/28/21  Yes Caro Gordon MD   simvastatin (ZOCOR) 20 mg tablet TAKE 1 TABLET EVERY NIGHT 1/27/21  Yes Caro Gordon MD   albuterol (PROVENTIL HFA, VENTOLIN HFA, PROAIR HFA) 90 mcg/actuation inhaler INHALE 1 PUFF EVERY FOUR HOURS AS NEEDED FOR WHEEZING OR SHORTNESS OF BREATH. 1/27/21  Yes Caro Gordon MD   dilTIAZem ER (CARDIZEM CD) 120 mg capsule TAKE 1 CAPSULE EVERY DAY 10/30/20  Yes Savanah PRYOR NP   albuterol-ipratropium (DUO-NEB) 2.5 mg-0.5 mg/3 ml nebu INHALE 1 VIAL VIA NEBULIZER FOUR TIMES DAILY AS NEEDED 7/4/20  Yes Provider, Historical   cholecalciferol (VITAMIN D3) (2,000 UNITS /50 MCG) cap capsule Take 1 Cap by mouth daily.    Yes Provider, Historical   Calcium-Cholecalciferol, D3, 600 mg(1,500mg) -400 unit cap Take 2 Tabs by mouth daily. Yes Provider, Historical   lamoTRIgine (LaMICtal) 150 mg tablet TAKE 1 TABLET TWICE DAILY 7/17/20  Yes Maximo Santiago MD   fluticasone/umeclidin/vilanter (TRELEGY ELLIPTA IN) Take 1 Puff by inhalation daily. Yes Provider, Historical   aspirin delayed-release 81 mg tablet Take 81 mg by mouth every fourty-eight (48) hours. Yes Provider, Historical   meclizine (ANTIVERT) 25 mg tablet Take 1 Tab by mouth three (3) times daily as needed. Patient taking differently: Take 25 mg by mouth three (3) times daily as needed for Dizziness. 1/10/18  Yes Erik Sanderson MD   budesonide-formoteroL (SYMBICORT) 80-4.5 mcg/actuation HFAA TWICE A DAY    Provider, Historical   calcium carbonate (CALTREX) 600 mg calcium (1,500 mg) tablet 1,200 mg. Provider, Historical   chlorhexidine (PERIDEX) 0.12 % solution chlorhexidine gluconate 0.12 % mouthwash    Provider, Historical   ciprofloxacin HCl (CIPRO) 500 mg tablet ciprofloxacin 500 mg tablet    Provider, Historical   cephalexin 500 mg tab  1/10/21   Provider, Historical   rivaroxaban (XARELTO STARTER HARDEEP) 15 mg (42)- 20 mg (9) DsPk 1 Tab. 1/10/21   Provider, Historical   acetaminophen-codeine (TYLENOL #3) 300-30 mg per tablet 2 Tabs. 1/10/21   Provider, Historical   methocarbamoL (ROBAXIN) 500 mg tablet Patient can take 2 every 8 hours as needed.  11/25/20   Erki Sanderson MD   doxycycline (PERIOSTAT) 20 mg tablet TAKE 1 CAPSULE BY MOUTH TWICE DAILY 7/18/19   Provider, Historical         Health Maintenance   Topic Date Due    COVID-19 Vaccine (1) Never done    Shingrix Vaccine Age 50> (1 of 2) Never done    Breast Cancer Screen Mammogram  03/31/2018    Pneumococcal 65+ years (2 of 2 - PPSV23) 10/22/2019    Colorectal Cancer Screening Combo  01/02/2021    Flu Vaccine (Season Ended) 09/01/2021    Medicare Yearly Exam  10/09/2021    Lipid Screen  10/10/2021    DTaP/Tdap/Td series (2 - Td) 11/17/2024    Hepatitis C Screening Completed    Bone Densitometry (Dexa) Screening  Completed       Review of Systems   Constitutional: Negative. Respiratory: Negative. Cardiovascular: Negative. Gastrointestinal: Negative. Visit Vitals  /80 (BP 1 Location: Left arm, BP Patient Position: Sitting, BP Cuff Size: Small adult)   Pulse 74   Temp 97.1 °F (36.2 °C) (Temporal)   Resp 16   Ht 4' 11\" (1.499 m)   Wt 160 lb (72.6 kg)   SpO2 99%   BMI 32.32 kg/m²       Physical Exam  Constitutional:       Appearance: Normal appearance. Cardiovascular:      Rate and Rhythm: Normal rate and regular rhythm. Pulses: Normal pulses. Heart sounds: Normal heart sounds. Pulmonary:      Effort: Pulmonary effort is normal.   Neurological:      Mental Status: She is alert. ASSESSMENT/PLAN    Diagnoses and all orders for this visit:    1. Essential hypertension    2. Essential tremor    3. Other emphysema ([address]    4. Mixed hyperlipidemia      Follow-up and Dispositions    · Return in about 3 months (around 7/19/2021).

## 2021-05-01 DIAGNOSIS — I10 ESSENTIAL HYPERTENSION: ICD-10-CM

## 2021-05-05 RX ORDER — DILTIAZEM HYDROCHLORIDE 120 MG/1
CAPSULE, COATED, EXTENDED RELEASE ORAL
Qty: 90 CAP | Refills: 1 | Status: SHIPPED | OUTPATIENT
Start: 2021-05-05 | End: 2021-11-12

## 2021-06-17 ENCOUNTER — OFFICE VISIT (OUTPATIENT)
Dept: NEUROLOGY | Age: 76
End: 2021-06-17
Payer: MEDICARE

## 2021-06-17 DIAGNOSIS — F41.8 ANXIETY ABOUT HEALTH: ICD-10-CM

## 2021-06-17 DIAGNOSIS — G93.0 CYST OF BRAIN: ICD-10-CM

## 2021-06-17 DIAGNOSIS — R41.3 SHORT-TERM MEMORY LOSS: ICD-10-CM

## 2021-06-17 DIAGNOSIS — G31.84 MILD COGNITIVE IMPAIRMENT: Primary | ICD-10-CM

## 2021-06-17 DIAGNOSIS — G44.85 IDIOPATHIC STABBING HEADACHE: ICD-10-CM

## 2021-06-17 DIAGNOSIS — R45.4 IRRITABILITY AND ANGER: ICD-10-CM

## 2021-06-17 DIAGNOSIS — R41.89 COGNITIVE DECLINE: ICD-10-CM

## 2021-06-17 PROCEDURE — 90791 PSYCH DIAGNOSTIC EVALUATION: CPT | Performed by: CLINICAL NEUROPSYCHOLOGIST

## 2021-06-17 NOTE — PROGRESS NOTES
[address] [facility],[address]  [address]   [address] [place] [address] [place] [address] [address]   [phone] Office   [phone] Fax      Neuropsychology    Initial Diagnostic Interview Note      Referral:  Thalia Sim MD, Carole Resendiz NP    Carlee Perry is a 77 y.o. right handed   female who was accompanied by her daughter to the initial clinical interview on 6/17/21. Please refer to her medical records for details pertaining to her history. At the start of the appointment, I reviewed the patient's [facility] Epic Chart (including Media scanned in from previous providers) for the active Problem List, all pertinent Past Medical Hx, medications, recent radiologic and laboratory findings. In addition, I reviewed pt's documented Immunization Record and Encounter History. She completed high school and had some trouble in school in the third grade. She would play around a lot in school. She did repeat [employer]  She is retired and worked in a variety of capacities. Daughter has noticed the patient is having problems with short term memory. She forgets the content of conversations. Misplaces things. Starts tasks and does not complete. She has a hard time coming up with the right word. She does get confused at times. She has seizures. No known stroke, meningitis/encephalitis, JEREMIE, Lupus, Lyme, TBI. Appetite is fine. Sleep is fine. Emotionally, she gets mad when she can't remember something. She has not been driving - the kids did that - maybe about 3 years ago due to vertigo. She takes her own medications. Family assists with finances. She did not remember seeing her son and had thought he had passed away. She thought her grandson had another sibling but he doesn't. Problems started about December of 2019 and have been worsening since. Saw neuro and here now for evaluation. She had paid bills twice.   Frustrated about all of this. No prior major psychiatric history. No problems with her ADLs. She was on back porch crying about all of this. MRI BRAIN WITHOUT CONTRAST:     Clinical History: Short-term memory loss.     T1 and T2 weighted images were obtained in the axial and coronal planes. Diffusion weighted images were also obtained.     Comparison is made with a previous MRI brain examination of May 2018.           There has been no significant interval change. Available scans show the  ventricles to be midline and of normal size. Diffusion-weighted images show no  acute infarction. Gradient echo images show no intra or extra-axial hemorrhage. No intra or extra-axial neoplasm is demonstrated. There are no MR findings to  suggest demyelinating disease. 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 Pituitary gland is of normal size     Scans through the posterior fossa show the brainstem and the cerebellum to have  a normal MR appearance.        Signal void is noted in both internal carotid arteries and the basilar artery  consistent with patency of these vessels.     The cerebellar tonsils are at the level of the foramen magnum in a satisfactory  position.       Both orbits have a normal MR appearance.     IMPRESSION  No interval change since the previous MRI brain of May 2018. Right  temporal lobe cystic lesion unchanged.     No acute intracranial abnormality.       Neuropsychological Mental Status Exam (NMSE):      Historian: Good  Praxis: No UE apraxia  R/L Orientation: Intact to self and to other  Dress: within normal limits   Weight: within normal limits   Appearance/Hygiene: within normal limits   Gait: within normal limits   Assistive Devices: Glasses  Mood: within normal limits   Affect: within normal limits   Comprehension: within normal limits   Thought Process: within normal limits Expressive Language: within normal limits   Receptive Language: within normal limits   Motor:  No cognitive or motor perseveration  ETOH:  Tobacco:  Illicit:  SI/HI:  Psychosis:  Insight: Within normal limits  Judgment: Within normal limits  Other Psych:      Past Medical History:   Diagnosis Date    Chronic obstructive pulmonary disease (HCC)     Hyperlipidemia     Seizures (HCC)        Past Surgical History:   Procedure Laterality Date    HX BREAST LUMPECTOMY      HX  SECTION      HX ORTHOPAEDIC      trigger finger    HX WISDOM TEETH EXTRACTION         No Known Allergies    Family History   Problem Relation Age of Onset    Heart Disease Father     Stroke Father     Cancer Paternal Aunt         breast    Cancer Maternal Grandmother     Cancer Paternal Grandmother         colon    Cancer Son         colon    Cancer Paternal Aunt         breast       Social History     Tobacco Use    Smoking status: Current Every Day Smoker     Packs/day: 0.50     Years: 50.00     Pack years: 25.00     Types: Cigarettes    Smokeless tobacco: Never Used   Vaping Use    Vaping Use: Never used   Substance Use Topics    Alcohol use: No    Drug use: No       Current Outpatient Medications   Medication Sig Dispense Refill    dilTIAZem ER (CARDIZEM CD) 120 mg capsule TAKE 1 CAPSULE EVERY DAY 90 Cap 1    azithromycin (ZITHROMAX) 250 mg tablet 250 mg.      budesonide-formoteroL (SYMBICORT) 80-4.5 mcg/actuation HFAA TWICE A DAY      calcium carbonate (CALTREX) 600 mg calcium (1,500 mg) tablet 1,200 mg.  chlorhexidine (PERIDEX) 0.12 % solution chlorhexidine gluconate 0.12 % mouthwash      ciprofloxacin HCl (CIPRO) 500 mg tablet ciprofloxacin 500 mg tablet      montelukast (SINGULAIR) 10 mg tablet TAKE 1 TABLET BY MOUTH EVERY DAY      rivaroxaban (Xarelto) 20 mg tab tablet Take 1 Tab by mouth daily.  30 Tab 3    simvastatin (ZOCOR) 20 mg tablet TAKE 1 TABLET EVERY NIGHT 90 Tab 1    albuterol (PROVENTIL HFA, VENTOLIN HFA, PROAIR HFA) 90 mcg/actuation inhaler INHALE 1 PUFF EVERY FOUR HOURS AS NEEDED FOR WHEEZING OR SHORTNESS OF BREATH. 18 g 1    cephalexin 500 mg tab       rivaroxaban (XARELTO STARTER HARDEEP) 15 mg (42)- 20 mg (9) DsPk 1 Tab.  acetaminophen-codeine (TYLENOL #3) 300-30 mg per tablet 2 Tabs.  methocarbamoL (ROBAXIN) 500 mg tablet Patient can take 2 every 8 hours as needed. 30 Tab 0    albuterol-ipratropium (DUO-NEB) 2.5 mg-0.5 mg/3 ml nebu INHALE 1 VIAL VIA NEBULIZER FOUR TIMES DAILY AS NEEDED      cholecalciferol (VITAMIN D3) (2,000 UNITS /50 MCG) cap capsule Take 1 Cap by mouth daily.  Calcium-Cholecalciferol, D3, 600 mg(1,500mg) -400 unit cap Take 2 Tabs by mouth daily.  lamoTRIgine (LaMICtal) 150 mg tablet TAKE 1 TABLET TWICE DAILY 180 Tab 2    doxycycline (PERIOSTAT) 20 mg tablet TAKE 1 CAPSULE BY MOUTH TWICE DAILY  2    fluticasone/umeclidin/vilanter (TRELEGY ELLIPTA IN) Take 1 Puff by inhalation daily.  aspirin delayed-release 81 mg tablet Take 81 mg by mouth every fourty-eight (48) hours.  meclizine (ANTIVERT) 25 mg tablet Take 1 Tab by mouth three (3) times daily as needed. (Patient taking differently: Take 25 mg by mouth three (3) times daily as needed for Dizziness.) 30 Tab 1         Plan:  Obtain authorization for testing from insurance company. Report to follow once testing, scoring, and interpretation completed. ? Organic based neurocognitive issues versus mood disorder or combination of same. ? Problems organic, functional, or both? This note will not be viewable in [address]

## 2021-06-21 DIAGNOSIS — G40.209 CRYPTOGENIC PARTIAL COMPLEX EPILEPSY (HCC): ICD-10-CM

## 2021-06-21 NOTE — TELEPHONE ENCOUNTER
Requested Prescriptions     Pending Prescriptions Disp Refills    lamoTRIgine (LaMICtal) 150 mg tablet 180 Tablet 2

## 2021-06-22 RX ORDER — LAMOTRIGINE 150 MG/1
TABLET ORAL
Qty: 180 TABLET | Refills: 2 | Status: SHIPPED | OUTPATIENT
Start: 2021-06-22 | End: 2021-07-06 | Stop reason: ALTCHOICE

## 2021-07-06 ENCOUNTER — OFFICE VISIT (OUTPATIENT)
Dept: INTERNAL MEDICINE CLINIC | Age: 76
End: 2021-07-06
Payer: MEDICARE

## 2021-07-06 VITALS
WEIGHT: 161.4 LBS | BODY MASS INDEX: 32.54 KG/M2 | RESPIRATION RATE: 18 BRPM | HEIGHT: 59 IN | TEMPERATURE: 97.8 F | DIASTOLIC BLOOD PRESSURE: 74 MMHG | SYSTOLIC BLOOD PRESSURE: 123 MMHG | OXYGEN SATURATION: 99 % | HEART RATE: 65 BPM

## 2021-07-06 DIAGNOSIS — I10 ESSENTIAL HYPERTENSION: Primary | ICD-10-CM

## 2021-07-06 DIAGNOSIS — E78.2 MIXED HYPERLIPIDEMIA: ICD-10-CM

## 2021-07-06 DIAGNOSIS — R00.0 TACHYARRHYTHMIA: ICD-10-CM

## 2021-07-06 DIAGNOSIS — G25.0 ESSENTIAL TREMOR: ICD-10-CM

## 2021-07-06 PROCEDURE — G8399 PT W/DXA RESULTS DOCUMENT: HCPCS | Performed by: INTERNAL MEDICINE

## 2021-07-06 PROCEDURE — G8754 DIAS BP LESS 90: HCPCS | Performed by: INTERNAL MEDICINE

## 2021-07-06 PROCEDURE — G8752 SYS BP LESS 140: HCPCS | Performed by: INTERNAL MEDICINE

## 2021-07-06 PROCEDURE — 1100F PTFALLS ASSESS-DOCD GE2>/YR: CPT | Performed by: INTERNAL MEDICINE

## 2021-07-06 PROCEDURE — 3017F COLORECTAL CA SCREEN DOC REV: CPT | Performed by: INTERNAL MEDICINE

## 2021-07-06 PROCEDURE — G8536 NO DOC ELDER MAL SCRN: HCPCS | Performed by: INTERNAL MEDICINE

## 2021-07-06 PROCEDURE — G8417 CALC BMI ABV UP PARAM F/U: HCPCS | Performed by: INTERNAL MEDICINE

## 2021-07-06 PROCEDURE — 1090F PRES/ABSN URINE INCON ASSESS: CPT | Performed by: INTERNAL MEDICINE

## 2021-07-06 PROCEDURE — G8427 DOCREV CUR MEDS BY ELIG CLIN: HCPCS | Performed by: INTERNAL MEDICINE

## 2021-07-06 PROCEDURE — 99213 OFFICE O/P EST LOW 20 MIN: CPT | Performed by: INTERNAL MEDICINE

## 2021-07-06 PROCEDURE — 3288F FALL RISK ASSESSMENT DOCD: CPT | Performed by: INTERNAL MEDICINE

## 2021-07-06 PROCEDURE — G8510 SCR DEP NEG, NO PLAN REQD: HCPCS | Performed by: INTERNAL MEDICINE

## 2021-07-06 NOTE — PROGRESS NOTES
Chief Complaint   Patient presents with    Blood Pressure Check     3 mo follow up     COPD    Cholesterol Problem         1. Have you been to the ER, urgent care clinic since your last visit? Hospitalized since your last visit? No    2. Have you seen or consulted any other health care providers outside of the [address] [place] [address] since your last visit? Include any pap smears or colon screening.  No

## 2021-07-06 NOTE — PROGRESS NOTES
Follow Up Visit    Raquel Hughes is a 77 y.o. female. she presents for Blood Pressure Check (3 mo follow up ), COPD, and Cholesterol Problem    She had a cold in May--did not tolerate the Amoxicillin. This resolved. He has been to the [facility]. Told no cure for vertigo. We will take over Meclizine prescribing. Cardiovascular Review  The patient has hypertension and hyperlipidemia. She reports taking medications as instructed, no medication side effects noted. Diet and Lifestyle: generally follows a low fat low cholesterol diet, generally follows a low sodium diet. Lab review: labs reviewed and discussed with patient. She has noted episodic lower blood pressures. I advised she separate her cardizem from lamictal.  She will try this and inform me of her progress. Pulmonary Review  The patient is being seen for COPD. Symptoms occur: daily, infrequently. Current limitations in activity: she does note shortness of breath with moderate activity. .  Coughing when present is described as moderate. Regimen compliance: The patient reports adherence to this regimen. Patient does not smoke cigarette. She will see Dr. Claude Ross (pulmonary) in August.         Patient Active Problem List   Diagnosis Code    HTN (hypertension) I10    Tachyarrhythmia R00.0    Mitral valve prolapse I34.1    Hyperlipidemia E78.5    Obesity (BMI 30.0-34. 9) E66.9    Cyst of brain G93.0    Cryptogenic partial complex epilepsy ([facility] [address] G40.209    Essential tremor G25.0    COPD (chronic obstructive pulmonary disease) ([facility]) J44.9         Prior to Admission medications    Medication Sig Start Date End Date Taking? Authorizing Provider   lamoTRIgine (LaMICtal) 150 mg tablet TAKE 1 TABLET TWICE DAILY 6/22/21 7/6/21 Yes Ruth Cordon MD   dilTIAZem ER (CARDIZEM CD) 120 mg capsule TAKE 1 CAPSULE EVERY DAY 5/5/21  Yes Radha Gomez MD   calcium carbonate (CALTREX) 600 mg calcium (1,500 mg) tablet 1,200 mg.    Yes Provider, Historical   montelukast (SINGULAIR) 10 mg tablet TAKE 1 TABLET BY MOUTH EVERY DAY 3/26/21 7/6/21 Yes Provider, Historical   simvastatin (ZOCOR) 20 mg tablet TAKE 1 TABLET EVERY NIGHT 1/27/21  Yes Olena Tyler MD   albuterol (PROVENTIL HFA, VENTOLIN HFA, PROAIR HFA) 90 mcg/actuation inhaler INHALE 1 PUFF EVERY FOUR HOURS AS NEEDED FOR WHEEZING OR SHORTNESS OF BREATH. 1/27/21  Yes Olena Tyler MD   albuterol-ipratropium (DUO-NEB) 2.5 mg-0.5 mg/3 ml nebu INHALE 1 VIAL VIA NEBULIZER FOUR TIMES DAILY AS NEEDED 7/4/20  Yes Provider, Historical   cholecalciferol (VITAMIN D3) (2,000 UNITS /50 MCG) cap capsule Take 1 Cap by mouth daily. Yes Provider, Historical   fluticasone/umeclidin/vilanter (TRELEGY ELLIPTA IN) Take 1 Puff by inhalation daily. Yes Provider, Historical   aspirin delayed-release 81 mg tablet Take 81 mg by mouth every fourty-eight (48) hours. Yes Provider, Historical   meclizine (ANTIVERT) 25 mg tablet Take 1 Tab by mouth three (3) times daily as needed. Patient taking differently: Take 25 mg by mouth three (3) times daily as needed for Dizziness. 1/10/18  Yes Olena Tyler MD   azithromycin [facility]) 250 mg tablet 250 mg.  7/6/21  Provider, Historical   budesonide-formoteroL (SYMBICORT) 80-4.5 mcg/actuation HFAA TWICE A DAY  7/6/21  Provider, Historical   chlorhexidine (PERIDEX) 0.12 % solution chlorhexidine gluconate 0.12 % mouthwash  7/6/21  Provider, Historical   ciprofloxacin HCl (CIPRO) 500 mg tablet ciprofloxacin 500 mg tablet  7/6/21  Provider, Historical   rivaroxaban (Xarelto) 20 mg tab tablet Take 1 Tab by mouth daily. 1/28/21 7/6/21  Olena Tyler MD   cephalexin 500 mg tab  1/10/21 7/6/21  Provider, Historical   rivaroxaban (XARELTO STARTER HARDEEP) 15 mg (42)- 20 mg (9) DsPk 1 Tab. 1/10/21 7/6/21  Provider, Historical   acetaminophen-codeine (TYLENOL #3) 300-30 mg per tablet 2 Tabs.  1/10/21 7/6/21  Provider, Historical   methocarbamoL (ROBAXIN) 500 mg tablet Patient can take 2 every 8 hours as needed. 11/25/20 7/6/21  Kelvin Mcdermott MD   Calcium-Cholecalciferol, D3, 600 mg(1,500mg) -400 unit cap Take 2 Tabs by mouth daily. 7/6/21  Provider, Historical   doxycycline (PERIOSTAT) 20 mg tablet TAKE 1 CAPSULE BY MOUTH TWICE DAILY 7/18/19 7/6/21  Provider, Historical         Health Maintenance   Topic Date Due    COVID-19 Vaccine (1) Never done    Shingrix Vaccine Age 50> (1 of 2) Never done    Breast Cancer Screen Mammogram  03/31/2018    Pneumococcal 65+ years (2 of 2 - PPSV23) 10/22/2019    Colorectal Cancer Screening Combo  01/02/2021    Flu Vaccine (1) 09/01/2021    Medicare Yearly Exam  10/09/2021    Lipid Screen  10/10/2021    DTaP/Tdap/Td series (2 - Td or Tdap) 11/17/2024    Hepatitis C Screening  Completed    Bone Densitometry (Dexa) Screening  Completed       Review of Systems   Constitutional: Negative. Respiratory: Negative. Cardiovascular: Negative. Genitourinary: Negative. Visit Vitals  /74 (BP 1 Location: Left upper arm, BP Patient Position: Sitting, BP Cuff Size: Large adult)   Pulse 65   Temp 97.8 °F (36.6 °C) (Temporal)   Resp 18   Ht 4' 11\" (1.499 m)   Wt 161 lb 6.4 oz (73.2 kg)   SpO2 99%   BMI 32.60 kg/m²       Physical Exam  Constitutional:       Appearance: Normal appearance. Cardiovascular:      Rate and Rhythm: Normal rate and regular rhythm. Pulses: Normal pulses. Heart sounds: Normal heart sounds. Pulmonary:      Effort: Pulmonary effort is normal.      Breath sounds: Normal breath sounds. Neurological:      Mental Status: She is alert. ASSESSMENT/PLAN    Diagnoses and all orders for this visit:    1. Essential hypertension - She will take her Cardizem at lunch (episodic low blood pressure). Follow pressures closely. -     CBC WITH AUTOMATED DIFF; Future  -     METABOLIC PANEL, COMPREHENSIVE; Future    2. Essential tremor    3.  Mixed hyperlipidemia  -     LIPID PANEL; Future    Follow-up and Dispositions    · Return if symptoms worsen or fail to improve.

## 2021-07-07 DIAGNOSIS — R00.0 TACHYARRHYTHMIA: ICD-10-CM

## 2021-07-07 DIAGNOSIS — I10 ESSENTIAL HYPERTENSION: Primary | ICD-10-CM

## 2021-07-07 DIAGNOSIS — G40.209 CRYPTOGENIC PARTIAL COMPLEX EPILEPSY (HCC): ICD-10-CM

## 2021-08-16 ENCOUNTER — TELEPHONE (OUTPATIENT)
Dept: INTERNAL MEDICINE CLINIC | Age: 76
End: 2021-08-16

## 2021-08-16 DIAGNOSIS — E78.2 MIXED HYPERLIPIDEMIA: ICD-10-CM

## 2021-08-16 RX ORDER — SIMVASTATIN 20 MG/1
TABLET, FILM COATED ORAL
Qty: 90 TABLET | Refills: 1 | Status: SHIPPED | OUTPATIENT
Start: 2021-08-16 | End: 2022-02-28 | Stop reason: SDUPTHER

## 2021-08-16 NOTE — TELEPHONE ENCOUNTER
Tay Oden () [phone] (H)     PT was prescribed percocet for pain after breaking wrist, PT's son says it makes her groggy and confused and was wondering if she would be ok to just take ibuprofen instead.      Would like call back to discuss this and dosages if possible

## 2021-08-17 NOTE — TELEPHONE ENCOUNTER
Patient son Brice Quivers return call. He stated his mother fall and fracture&crushed right wrist.  She has an appointment at [address] [place] [address] Thursday August 19, 2021. Per Dr Jason Yin she can take 800 mg ibuprofen every 8 hrs prn. Can alternate with tylenol arthritis 2 tabs every 8 hours.

## 2021-08-20 RX ORDER — AZITHROMYCIN 250 MG/1
250 TABLET, FILM COATED ORAL SEE ADMIN INSTRUCTIONS
COMMUNITY

## 2021-08-20 RX ORDER — PREDNISONE 10 MG/1
10 TABLET ORAL DAILY
COMMUNITY

## 2021-08-23 RX ORDER — LAMOTRIGINE 150 MG/1
150 TABLET ORAL 2 TIMES DAILY
COMMUNITY
End: 2021-12-31

## 2021-08-23 NOTE — ROUTINE PROCESS
Per Monae Castorena at [facility]-- MD to decide abx DOS due to patient's allergy.

## 2021-08-24 ENCOUNTER — HOSPITAL ENCOUNTER (OUTPATIENT)
Age: 76
Discharge: HOME | End: 2021-08-24
Attending: ORTHOPAEDIC SURGERY | Admitting: ORTHOPAEDIC SURGERY
Payer: MEDICARE

## 2021-08-24 ENCOUNTER — APPOINTMENT (OUTPATIENT)
Dept: GENERAL RADIOLOGY | Age: 76
End: 2021-08-24
Attending: ORTHOPAEDIC SURGERY
Payer: MEDICARE

## 2021-08-24 ENCOUNTER — ANESTHESIA EVENT (OUTPATIENT)
Dept: SURGERY | Age: 76
End: 2021-08-24
Payer: MEDICARE

## 2021-08-24 ENCOUNTER — ANESTHESIA (OUTPATIENT)
Dept: SURGERY | Age: 76
End: 2021-08-24
Payer: MEDICARE

## 2021-08-24 VITALS
RESPIRATION RATE: 20 BRPM | HEIGHT: 60 IN | BODY MASS INDEX: 31.02 KG/M2 | TEMPERATURE: 97.6 F | DIASTOLIC BLOOD PRESSURE: 65 MMHG | SYSTOLIC BLOOD PRESSURE: 133 MMHG | OXYGEN SATURATION: 96 % | HEART RATE: 61 BPM | WEIGHT: 158 LBS

## 2021-08-24 DIAGNOSIS — S52.501A CLOSED FRACTURE OF DISTAL END OF RIGHT RADIUS, UNSPECIFIED FRACTURE MORPHOLOGY, INITIAL ENCOUNTER: Primary | ICD-10-CM

## 2021-08-24 LAB
COVID-19 RAPID TEST, COVR: NOT DETECTED
SPECIMEN SOURCE: NORMAL

## 2021-08-24 PROCEDURE — 77030002933 HC SUT MCRYL J&J -A: Performed by: ORTHOPAEDIC SURGERY

## 2021-08-24 PROCEDURE — C1713 ANCHOR/SCREW BN/BN,TIS/BN: HCPCS | Performed by: ORTHOPAEDIC SURGERY

## 2021-08-24 PROCEDURE — 2709999900 HC NON-CHARGEABLE SUPPLY: Performed by: ORTHOPAEDIC SURGERY

## 2021-08-24 PROCEDURE — 76060000035 HC ANESTHESIA 2 TO 2.5 HR: Performed by: ORTHOPAEDIC SURGERY

## 2021-08-24 PROCEDURE — 77030031139 HC SUT VCRL2 J&J -A: Performed by: ORTHOPAEDIC SURGERY

## 2021-08-24 PROCEDURE — 77030040361 HC SLV COMPR DVT MDII -B: Performed by: ORTHOPAEDIC SURGERY

## 2021-08-24 PROCEDURE — 76000 FLUOROSCOPY <1 HR PHYS/QHP: CPT

## 2021-08-24 PROCEDURE — A4565 SLINGS: HCPCS | Performed by: ORTHOPAEDIC SURGERY

## 2021-08-24 PROCEDURE — 74011000250 HC RX REV CODE- 250: Performed by: ANESTHESIOLOGY

## 2021-08-24 PROCEDURE — 76210000063 HC OR PH I REC FIRST 0.5 HR: Performed by: ORTHOPAEDIC SURGERY

## 2021-08-24 PROCEDURE — 74011250637 HC RX REV CODE- 250/637: Performed by: ANESTHESIOLOGY

## 2021-08-24 PROCEDURE — 76010000171 HC OR TIME 2 TO 2.5 HR INTENSV-TIER 1: Performed by: ORTHOPAEDIC SURGERY

## 2021-08-24 PROCEDURE — 74011250636 HC RX REV CODE- 250/636: Performed by: ANESTHESIOLOGY

## 2021-08-24 PROCEDURE — 74011000250 HC RX REV CODE- 250

## 2021-08-24 PROCEDURE — 77030041703 HC SLV COMPR DVT ARJO -B: Performed by: ORTHOPAEDIC SURGERY

## 2021-08-24 PROCEDURE — 87635 SARS-COV-2 COVID-19 AMP PRB: CPT

## 2021-08-24 PROCEDURE — 74011250636 HC RX REV CODE- 250/636: Performed by: ORTHOPAEDIC SURGERY

## 2021-08-24 PROCEDURE — 77030035485 HC BIT DRL DVR XLOK QC BIOM -C: Performed by: ORTHOPAEDIC SURGERY

## 2021-08-24 PROCEDURE — 76210000022 HC REC RM PH II 1.5 TO 2 HR: Performed by: ORTHOPAEDIC SURGERY

## 2021-08-24 PROCEDURE — 74011000250 HC RX REV CODE- 250: Performed by: ORTHOPAEDIC SURGERY

## 2021-08-24 PROCEDURE — 77030008462 HC STPLR SKN PROX J&J -A: Performed by: ORTHOPAEDIC SURGERY

## 2021-08-24 PROCEDURE — 77030003028 HC SUT VCRL J&J -A: Performed by: ORTHOPAEDIC SURGERY

## 2021-08-24 DEVICE — SCR BNE NLCK LO PROF 2.7X15MM -- DVR CROSSLOCK: Type: IMPLANTABLE DEVICE | Site: HAND | Status: FUNCTIONAL

## 2021-08-24 DEVICE — IMPLANTABLE DEVICE: Type: IMPLANTABLE DEVICE | Site: HAND | Status: FUNCTIONAL

## 2021-08-24 DEVICE — SCR BNE LCK SQ DRV 2.7X18MM --: Type: IMPLANTABLE DEVICE | Site: HAND | Status: FUNCTIONAL

## 2021-08-24 DEVICE — SCREW BONE L13MM D2.7MM DST RDL LOK FLLY THRDD SQRE DRIVE HE: Type: IMPLANTABLE DEVICE | Site: HAND | Status: FUNCTIONAL

## 2021-08-24 RX ORDER — LIDOCAINE HYDROCHLORIDE 20 MG/ML
INJECTION, SOLUTION EPIDURAL; INFILTRATION; INTRACAUDAL; PERINEURAL AS NEEDED
Status: DISCONTINUED | OUTPATIENT
Start: 2021-08-24 | End: 2021-08-24 | Stop reason: HOSPADM

## 2021-08-24 RX ORDER — DEXAMETHASONE SODIUM PHOSPHATE 4 MG/ML
INJECTION, SOLUTION INTRA-ARTICULAR; INTRALESIONAL; INTRAMUSCULAR; INTRAVENOUS; SOFT TISSUE AS NEEDED
Status: DISCONTINUED | OUTPATIENT
Start: 2021-08-24 | End: 2021-08-24 | Stop reason: HOSPADM

## 2021-08-24 RX ORDER — ALBUTEROL SULFATE 90 UG/1
AEROSOL, METERED RESPIRATORY (INHALATION) AS NEEDED
Status: DISCONTINUED | OUTPATIENT
Start: 2021-08-24 | End: 2021-08-24 | Stop reason: HOSPADM

## 2021-08-24 RX ORDER — SODIUM CHLORIDE, SODIUM LACTATE, POTASSIUM CHLORIDE, CALCIUM CHLORIDE 600; 310; 30; 20 MG/100ML; MG/100ML; MG/100ML; MG/100ML
20 INJECTION, SOLUTION INTRAVENOUS CONTINUOUS
Status: DISCONTINUED | OUTPATIENT
Start: 2021-08-24 | End: 2021-08-26 | Stop reason: HOSPADM

## 2021-08-24 RX ORDER — DEXAMETHASONE SODIUM PHOSPHATE 10 MG/ML
INJECTION INTRAMUSCULAR; INTRAVENOUS
Status: DISCONTINUED
Start: 2021-08-24 | End: 2021-08-24 | Stop reason: HOSPADM

## 2021-08-24 RX ORDER — SODIUM CHLORIDE, SODIUM LACTATE, POTASSIUM CHLORIDE, CALCIUM CHLORIDE 600; 310; 30; 20 MG/100ML; MG/100ML; MG/100ML; MG/100ML
INJECTION, SOLUTION INTRAVENOUS
Status: DISCONTINUED | OUTPATIENT
Start: 2021-08-24 | End: 2021-08-24 | Stop reason: HOSPADM

## 2021-08-24 RX ORDER — SODIUM CHLORIDE 0.9 % (FLUSH) 0.9 %
5-40 SYRINGE (ML) INJECTION EVERY 8 HOURS
Status: DISCONTINUED | OUTPATIENT
Start: 2021-08-24 | End: 2021-08-24 | Stop reason: HOSPADM

## 2021-08-24 RX ORDER — FENTANYL CITRATE 50 UG/ML
25 INJECTION, SOLUTION INTRAMUSCULAR; INTRAVENOUS
Status: DISCONTINUED | OUTPATIENT
Start: 2021-08-24 | End: 2021-08-24 | Stop reason: HOSPADM

## 2021-08-24 RX ORDER — OXYCODONE AND ACETAMINOPHEN 5; 325 MG/1; MG/1
1 TABLET ORAL ONCE
Status: COMPLETED | OUTPATIENT
Start: 2021-08-24 | End: 2021-08-24

## 2021-08-24 RX ORDER — SODIUM CHLORIDE 0.9 % (FLUSH) 0.9 %
5-40 SYRINGE (ML) INJECTION AS NEEDED
Status: DISCONTINUED | OUTPATIENT
Start: 2021-08-24 | End: 2021-08-24 | Stop reason: HOSPADM

## 2021-08-24 RX ORDER — FENTANYL CITRATE 50 UG/ML
INJECTION, SOLUTION INTRAMUSCULAR; INTRAVENOUS AS NEEDED
Status: DISCONTINUED | OUTPATIENT
Start: 2021-08-24 | End: 2021-08-24 | Stop reason: HOSPADM

## 2021-08-24 RX ORDER — EPINEPHRINE 1 MG/ML
INJECTION, SOLUTION, CONCENTRATE INTRAVENOUS
Status: DISCONTINUED
Start: 2021-08-24 | End: 2021-08-24 | Stop reason: HOSPADM

## 2021-08-24 RX ORDER — MORPHINE SULFATE 2 MG/ML
2 INJECTION, SOLUTION INTRAMUSCULAR; INTRAVENOUS
Status: DISCONTINUED | OUTPATIENT
Start: 2021-08-24 | End: 2021-08-24 | Stop reason: HOSPADM

## 2021-08-24 RX ORDER — PROPOFOL 10 MG/ML
INJECTION, EMULSION INTRAVENOUS AS NEEDED
Status: DISCONTINUED | OUTPATIENT
Start: 2021-08-24 | End: 2021-08-24 | Stop reason: HOSPADM

## 2021-08-24 RX ORDER — BUPIVACAINE HYDROCHLORIDE 5 MG/ML
INJECTION, SOLUTION EPIDURAL; INTRACAUDAL AS NEEDED
Status: DISCONTINUED | OUTPATIENT
Start: 2021-08-24 | End: 2021-08-24 | Stop reason: HOSPADM

## 2021-08-24 RX ORDER — CEFAZOLIN SODIUM 1 G/3ML
INJECTION, POWDER, FOR SOLUTION INTRAMUSCULAR; INTRAVENOUS AS NEEDED
Status: DISCONTINUED | OUTPATIENT
Start: 2021-08-24 | End: 2021-08-24 | Stop reason: HOSPADM

## 2021-08-24 RX ORDER — ONDANSETRON 2 MG/ML
INJECTION INTRAMUSCULAR; INTRAVENOUS AS NEEDED
Status: DISCONTINUED | OUTPATIENT
Start: 2021-08-24 | End: 2021-08-24 | Stop reason: HOSPADM

## 2021-08-24 RX ORDER — ALBUTEROL SULFATE 0.83 MG/ML
2.5 SOLUTION RESPIRATORY (INHALATION)
Status: COMPLETED | OUTPATIENT
Start: 2021-08-24 | End: 2021-08-24

## 2021-08-24 RX ORDER — ALBUTEROL SULFATE 2.5 MG/.5ML
SOLUTION RESPIRATORY (INHALATION)
Status: COMPLETED
Start: 2021-08-24 | End: 2021-08-24

## 2021-08-24 RX ORDER — ONDANSETRON 2 MG/ML
4 INJECTION INTRAMUSCULAR; INTRAVENOUS AS NEEDED
Status: DISCONTINUED | OUTPATIENT
Start: 2021-08-24 | End: 2021-08-24 | Stop reason: HOSPADM

## 2021-08-24 RX ORDER — HYDROCODONE BITARTRATE AND ACETAMINOPHEN 5; 325 MG/1; MG/1
1 TABLET ORAL
Qty: 30 TABLET | Refills: 0 | Status: SHIPPED | OUTPATIENT
Start: 2021-08-24 | End: 2021-08-29

## 2021-08-24 RX ORDER — MIDAZOLAM HYDROCHLORIDE 1 MG/ML
INJECTION, SOLUTION INTRAMUSCULAR; INTRAVENOUS AS NEEDED
Status: DISCONTINUED | OUTPATIENT
Start: 2021-08-24 | End: 2021-08-24 | Stop reason: HOSPADM

## 2021-08-24 RX ORDER — DEXAMETHASONE SODIUM PHOSPHATE 4 MG/ML
4 INJECTION, SOLUTION INTRA-ARTICULAR; INTRALESIONAL; INTRAMUSCULAR; INTRAVENOUS; SOFT TISSUE ONCE
Status: COMPLETED | OUTPATIENT
Start: 2021-08-24 | End: 2021-08-24

## 2021-08-24 RX ORDER — ROPIVACAINE HYDROCHLORIDE 5 MG/ML
INJECTION, SOLUTION EPIDURAL; INFILTRATION; PERINEURAL
Status: DISCONTINUED
Start: 2021-08-24 | End: 2021-08-24 | Stop reason: HOSPADM

## 2021-08-24 RX ORDER — CLINDAMYCIN PHOSPHATE 600 MG/50ML
600 INJECTION INTRAVENOUS
Status: COMPLETED | OUTPATIENT
Start: 2021-08-24 | End: 2021-08-24

## 2021-08-24 RX ORDER — MIDAZOLAM HYDROCHLORIDE 1 MG/ML
INJECTION, SOLUTION INTRAMUSCULAR; INTRAVENOUS
Status: COMPLETED
Start: 2021-08-24 | End: 2021-08-24

## 2021-08-24 RX ORDER — FENTANYL CITRATE 50 UG/ML
INJECTION, SOLUTION INTRAMUSCULAR; INTRAVENOUS
Status: COMPLETED
Start: 2021-08-24 | End: 2021-08-24

## 2021-08-24 RX ADMIN — MIDAZOLAM HYDROCHLORIDE 2 MG: 2 INJECTION, SOLUTION INTRAMUSCULAR; INTRAVENOUS at 07:34

## 2021-08-24 RX ADMIN — PROPOFOL 140 MG: 10 INJECTION, EMULSION INTRAVENOUS at 07:50

## 2021-08-24 RX ADMIN — FENTANYL CITRATE 25 MCG: 50 INJECTION, SOLUTION INTRAMUSCULAR; INTRAVENOUS at 09:30

## 2021-08-24 RX ADMIN — CEFAZOLIN SODIUM 2 G: 1 INJECTION, POWDER, FOR SOLUTION INTRAMUSCULAR; INTRAVENOUS at 07:51

## 2021-08-24 RX ADMIN — CLINDAMYCIN PHOSPHATE 600 MG: 600 INJECTION, SOLUTION INTRAVENOUS at 07:50

## 2021-08-24 RX ADMIN — FENTANYL CITRATE 25 MCG: 50 INJECTION, SOLUTION INTRAMUSCULAR; INTRAVENOUS at 09:27

## 2021-08-24 RX ADMIN — FENTANYL CITRATE 50 MCG: 50 INJECTION, SOLUTION INTRAMUSCULAR; INTRAVENOUS at 08:14

## 2021-08-24 RX ADMIN — LIDOCAINE HYDROCHLORIDE 100 MG: 20 INJECTION, SOLUTION EPIDURAL; INFILTRATION; INTRACAUDAL; PERINEURAL at 07:50

## 2021-08-24 RX ADMIN — ONDANSETRON 2 MG: 2 INJECTION INTRAMUSCULAR; INTRAVENOUS at 08:21

## 2021-08-24 RX ADMIN — PHENYLEPHRINE HYDROCHLORIDE 100 MCG: 10 INJECTION INTRAVENOUS at 08:39

## 2021-08-24 RX ADMIN — SODIUM CHLORIDE, POTASSIUM CHLORIDE, SODIUM LACTATE AND CALCIUM CHLORIDE: 600; 310; 30; 20 INJECTION, SOLUTION INTRAVENOUS at 07:42

## 2021-08-24 RX ADMIN — OXYCODONE HYDROCHLORIDE AND ACETAMINOPHEN 1 TABLET: 5; 325 TABLET ORAL at 10:40

## 2021-08-24 RX ADMIN — DEXAMETHASONE SODIUM PHOSPHATE 4 MG: 4 INJECTION, SOLUTION INTRAMUSCULAR; INTRAVENOUS at 11:17

## 2021-08-24 RX ADMIN — PHENYLEPHRINE HYDROCHLORIDE 100 MCG: 10 INJECTION INTRAVENOUS at 09:17

## 2021-08-24 RX ADMIN — DEXAMETHASONE SODIUM PHOSPHATE 4 MG: 4 INJECTION, SOLUTION INTRA-ARTICULAR; INTRALESIONAL; INTRAMUSCULAR; INTRAVENOUS; SOFT TISSUE at 08:21

## 2021-08-24 RX ADMIN — ALBUTEROL SULFATE 2.5 MG: 2.5 SOLUTION RESPIRATORY (INHALATION) at 11:00

## 2021-08-24 RX ADMIN — SODIUM CHLORIDE, POTASSIUM CHLORIDE, SODIUM LACTATE AND CALCIUM CHLORIDE 20 ML/HR: 600; 310; 30; 20 INJECTION, SOLUTION INTRAVENOUS at 06:27

## 2021-08-24 RX ADMIN — ALBUTEROL SULFATE 2 PUFF: 108 AEROSOL, METERED RESPIRATORY (INHALATION) at 07:51

## 2021-08-24 RX ADMIN — PHENYLEPHRINE HYDROCHLORIDE 100 MCG: 10 INJECTION INTRAVENOUS at 08:24

## 2021-08-24 RX ADMIN — PROPOFOL 20 MG: 10 INJECTION, EMULSION INTRAVENOUS at 08:24

## 2021-08-24 RX ADMIN — ALBUTEROL SULFATE 2.5 MG: 2.5 SOLUTION RESPIRATORY (INHALATION) at 10:16

## 2021-08-24 RX ADMIN — PHENYLEPHRINE HYDROCHLORIDE 100 MCG: 10 INJECTION INTRAVENOUS at 09:03

## 2021-08-24 NOTE — ANESTHESIA POSTPROCEDURE EVALUATION
Procedure(s):  OPEN REDUCTION INTERNAL FIXATION RIGHT DISTAL RADIUS FRACTURE (URGENT).     general    Anesthesia Post Evaluation      Multimodal analgesia: multimodal analgesia used between 6 hours prior to anesthesia start to PACU discharge  Patient location during evaluation: PACU  Patient participation: complete - patient participated  Level of consciousness: awake  Pain score: 0  Pain management: adequate  Airway patency: patent  Anesthetic complications: no  Cardiovascular status: acceptable  Respiratory status: acceptable  Hydration status: acceptable  Post anesthesia nausea and vomiting:  controlled  Final Post Anesthesia Temperature Assessment:  Normothermia (36.0-37.5 degrees C)      INITIAL Post-op Vital signs:   Vitals Value Taken Time   /67 08/24/21 1015   Temp 36.4 °C (97.6 °F) 08/24/21 1015   Pulse 78 08/24/21 1015   Resp 16 08/24/21 1015   SpO2 93 % 08/24/21 1015

## 2021-08-24 NOTE — PROGRESS NOTES
Patient stated has a DNR order at home. Found the 1991, and 2016 advanced directives in the . Patient stated the advanced directive is from 2018. Family is going to send a picture copy to son who is with the patient now. Will continue to monitor patient.

## 2021-08-24 NOTE — OP NOTES
This is operative summary on patient cofer [place] Claude      Preoperative diagnosis-comminuted distal radius fracture right    Postoperative diagnosis-same    Procedure performed-open reduction internal fixation right distal radius    Hardware used-Biomet distal radius hand innovation plate and screws    Assistant-sa    Anesthesia-General anesthesia    Blood loss-0 cc    Tourniquet SHZB-65 minutes    Complications-none      After satisfactory administration of general anesthesia patient's right upper extremity was thoroughly scrubbed and prepped with chlorhexidine solution it was isolated with sterile drapes    Timeout was called in after which we raised the tourniquet pressure to 250 mmHg    Through a volar incision over the flexor carpi radialis longus tendon we made an incision approximately 2 inches long skin and subcutaneous tissue was incised bleeding vessels were cauterized FCR tendon was identified and it was retracted on the medial side and then through the bed of the FCR tendon we made an incision identified the pronator quadratus which was then released from the radial side and through the subperiosteal dissection we exposed the distal radius and the fracture site      It was noticed that the fracture was displaced dorsally and radially release of brachioradialis was done and then we aligned the fracture    Fixation was done under image intensification    Through the distal radius plate and then combination of locking and nonlocking screws we fixed the distal radius fracture satisfactorily    Once the fixation was done in the position of the screws in place were found satisfactory we irrigated the wound cauterized the bleeding vessels closed the wound with 2-0 Vicryl suture and 3-0 Monocryl suture applied Steri-Strips applied compression dressing and a volar plaster splint    Patient tolerated procedure well and was transferred out of the operating room in satisfactory condition

## 2021-08-24 NOTE — PROGRESS NOTES
Informed Dr. Keturah Negrete about pt's right hand discoloration and Dr. Keturah Negrete stated that as long as she is able to move her fingers and not in severe pain, pt can be discharged home. Order received for Decadron 4mg IV to help with swelling.

## 2021-08-29 LAB — CREATININE, EXTERNAL: 0.72

## 2021-08-30 ENCOUNTER — TELEPHONE (OUTPATIENT)
Dept: INTERNAL MEDICINE CLINIC | Age: 76
End: 2021-08-30

## 2021-08-30 NOTE — TELEPHONE ENCOUNTER
Addended by: MARY ANNE MOREJON on: 9/27/2017 11:28 AM     Modules accepted: Orders     Liz Dumont [phone]     Pt has an er fu with dr Elda Silva on Friday, she was at [facility] and dr Elda Silva can request results from them at fax number [phone].

## 2021-09-03 ENCOUNTER — OFFICE VISIT (OUTPATIENT)
Dept: INTERNAL MEDICINE CLINIC | Age: 76
End: 2021-09-03
Payer: MEDICARE

## 2021-09-03 VITALS
RESPIRATION RATE: 16 BRPM | BODY MASS INDEX: 31.8 KG/M2 | OXYGEN SATURATION: 97 % | SYSTOLIC BLOOD PRESSURE: 111 MMHG | HEART RATE: 60 BPM | DIASTOLIC BLOOD PRESSURE: 70 MMHG | HEIGHT: 60 IN | WEIGHT: 162 LBS | TEMPERATURE: 97.1 F

## 2021-09-03 DIAGNOSIS — I10 ESSENTIAL HYPERTENSION: Primary | ICD-10-CM

## 2021-09-03 DIAGNOSIS — S52.501D CLOSED FRACTURE OF DISTAL END OF RIGHT RADIUS WITH ROUTINE HEALING, UNSPECIFIED FRACTURE MORPHOLOGY, SUBSEQUENT ENCOUNTER: ICD-10-CM

## 2021-09-03 PROCEDURE — 3288F FALL RISK ASSESSMENT DOCD: CPT | Performed by: INTERNAL MEDICINE

## 2021-09-03 PROCEDURE — G8427 DOCREV CUR MEDS BY ELIG CLIN: HCPCS | Performed by: INTERNAL MEDICINE

## 2021-09-03 PROCEDURE — G8754 DIAS BP LESS 90: HCPCS | Performed by: INTERNAL MEDICINE

## 2021-09-03 PROCEDURE — 3017F COLORECTAL CA SCREEN DOC REV: CPT | Performed by: INTERNAL MEDICINE

## 2021-09-03 PROCEDURE — G8752 SYS BP LESS 140: HCPCS | Performed by: INTERNAL MEDICINE

## 2021-09-03 PROCEDURE — 1090F PRES/ABSN URINE INCON ASSESS: CPT | Performed by: INTERNAL MEDICINE

## 2021-09-03 PROCEDURE — G8510 SCR DEP NEG, NO PLAN REQD: HCPCS | Performed by: INTERNAL MEDICINE

## 2021-09-03 PROCEDURE — 99213 OFFICE O/P EST LOW 20 MIN: CPT | Performed by: INTERNAL MEDICINE

## 2021-09-03 PROCEDURE — G8417 CALC BMI ABV UP PARAM F/U: HCPCS | Performed by: INTERNAL MEDICINE

## 2021-09-03 PROCEDURE — G8536 NO DOC ELDER MAL SCRN: HCPCS | Performed by: INTERNAL MEDICINE

## 2021-09-03 PROCEDURE — 1100F PTFALLS ASSESS-DOCD GE2>/YR: CPT | Performed by: INTERNAL MEDICINE

## 2021-09-03 PROCEDURE — G8399 PT W/DXA RESULTS DOCUMENT: HCPCS | Performed by: INTERNAL MEDICINE

## 2021-09-03 NOTE — PROGRESS NOTES
Verified name and birth date for privacy precautions. Chart reviewed in preparation for today's visit. Chief Complaint   Patient presents with   [employer] ED Follow-up          Health Maintenance Due   Topic    COVID-19 Vaccine (1)    Shingrix Vaccine Age 49> (1 of 2)    Breast Cancer Screen Mammogram     Pneumococcal 65+ years (2 of 2 - PPSV23)    Colorectal Cancer Screening Combo     Flu Vaccine (1)         Wt Readings from Last 3 Encounters:   09/03/21 162 lb (73.5 kg)   08/24/21 158 lb (71.7 kg)   07/06/21 161 lb 6.4 oz (73.2 kg)     Temp Readings from Last 3 Encounters:   09/03/21 97.1 °F (36.2 °C) (Temporal)   08/24/21 97.6 °F (36.4 °C)   07/06/21 97.8 °F (36.6 °C) (Temporal)     BP Readings from Last 3 Encounters:   09/03/21 111/70   08/24/21 133/65   07/06/21 123/74     Pulse Readings from Last 3 Encounters:   09/03/21 60   08/24/21 61   07/06/21 65         Learning Assessment:  :     Learning Assessment 2/10/2021 6/19/2019 12/14/2017 6/14/2017 8/26/2015 11/17/2014   PRIMARY LEARNER Patient Patient Patient Patient Patient Patient   HIGHEST LEVEL OF EDUCATION - PRIMARY LEARNER  - - - - - GRADUATED HIGH SCHOOL OR GED   BARRIERS PRIMARY LEARNER - - - - - [address] CAREGIVER - - - - - No   PRIMARY LANGUAGE ENGLISH ENGLISH ENGLISH ENGLISH ENGLISH ENGLISH   LEARNER PREFERENCE PRIMARY LISTENING DEMONSTRATION READING READING READING READING   ANSWERED BY patient self patient patient patient patient   RELATIONSHIP SELF SELF SELF SELF SELF SELF       Depression Screening:  :     3 most recent PHQ Screens 9/3/2021   Little interest or pleasure in doing things Not at all   Feeling down, depressed, irritable, or hopeless Not at all   Total Score PHQ 2 0       Fall Risk Assessment:  :     Fall Risk Assessment, last 12 mths 9/3/2021   Able to walk? Yes   Fall in past 12 months? 1   Do you feel unsteady?  1   Are you worried about falling 1   Is TUG test greater than 12 seconds? -   Number of falls in past 12 months 1   Fall with injury? 1       Abuse Screening:  :     Abuse Screening Questionnaire 9/3/2021 10/22/2018 7/20/2018 10/10/2017   Do you ever feel afraid of your partner? N N N N   Are you in a relationship with someone who physically or mentally threatens you? N N N N   Is it safe for you to go home?  Celia Emanuel

## 2021-09-03 NOTE — PROGRESS NOTES
Acute Care Note    Rio Funes is 77 y.o. female. she presents for evaluation of ED Follow-up    The patient is following up after having sustained a fracture of the right distal radius. She had the fracture re-approximated and splinted. She has follow up with orthopedics scheduled. Otherwise, she has remained on medications. We discussed the need to continue her respiratory medications. She has an appointment with pulmonary upcoming. Prior to Admission medications    Medication Sig Start Date End Date Taking? Authorizing Provider   lamoTRIgine (LaMICtal) 150 mg tablet Take 150 mg by mouth two (2) times a day. Yes Provider, Historical   predniSONE (DELTASONE) 10 mg tablet Take 10 mg by mouth daily. Yes Provider, Historical   azithromycin (ZITHROMAX) 250 mg tablet Take 250 mg by mouth See Admin Instructions. Monday. Wednesday, Friday   Yes Provider, Historical   simvastatin (ZOCOR) 20 mg tablet TAKE 1 TABLET EVERY NIGHT 8/16/21  Yes Oliva Razo MD   dilTIAZem ER (CARDIZEM CD) 120 mg capsule TAKE 1 CAPSULE EVERY DAY 5/5/21  Yes Oliva Razo MD   calcium carbonate (CALTREX) 600 mg calcium (1,500 mg) tablet 1,200 mg. Yes Provider, Historical   albuterol (PROVENTIL HFA, VENTOLIN HFA, PROAIR HFA) 90 mcg/actuation inhaler INHALE 1 PUFF EVERY FOUR HOURS AS NEEDED FOR WHEEZING OR SHORTNESS OF BREATH. 1/27/21  Yes Oliva Razo MD   albuterol-ipratropium (DUO-NEB) 2.5 mg-0.5 mg/3 ml nebu INHALE 1 VIAL VIA NEBULIZER FOUR TIMES DAILY AS NEEDED 7/4/20  Yes Provider, Historical   cholecalciferol (VITAMIN D3) (2,000 UNITS /50 MCG) cap capsule Take 1 Cap by mouth daily. Yes Provider, Historical   fluticasone/umeclidin/vilanter (TRELEGY ELLIPTA IN) Take 1 Puff by inhalation daily. Yes Provider, Historical   aspirin delayed-release 81 mg tablet Take 81 mg by mouth daily.    Yes Provider, Historical   meclizine (ANTIVERT) 25 mg tablet Take 1 Tab by mouth three (3) times daily as needed. Patient taking differently: Take 25 mg by mouth three (3) times daily as needed for Dizziness. 1/10/18  Yes Uche Jacobo MD         Patient Active Problem List   Diagnosis Code    HTN (hypertension) I10    Tachyarrhythmia R00.0    Mitral valve prolapse I34.1    Hyperlipidemia E78.5    Obesity (BMI 30.0-34. 9) E66.9    Cyst of brain G93.0    Cryptogenic partial complex epilepsy ([facility]) G40.209    Essential tremor G25.0    COPD (chronic obstructive pulmonary disease) ([facility]) J44.9    Closed fracture of right distal radius S52.501A         Review of Systems   Constitutional: Negative. Respiratory: Negative. Cardiovascular: Negative. Genitourinary: Negative. Visit Vitals  /70 (BP 1 Location: Left arm, BP Patient Position: Sitting, BP Cuff Size: Large adult)   Pulse 60   Temp 97.1 °F (36.2 °C) (Temporal)   Resp 16   Ht 5' (1.524 m)   Wt 162 lb (73.5 kg)   SpO2 97%   BMI 31.64 kg/m²       Physical Exam  Constitutional:       Appearance: Normal appearance. Cardiovascular:      Rate and Rhythm: Normal rate and regular rhythm. Pulses: Normal pulses. Heart sounds: Normal heart sounds. Musculoskeletal:      Comments: Right wrist splinted. Neurological:      Mental Status: She is alert. ASSESSMENT/PLAN  Diagnoses and all orders for this visit:    1. Essential hypertension    2. Closed fracture of distal end of right radius with routine healing, unspecified fracture morphology, subsequent encounter - Continue monitoring, further plan of care per ortho. Advised the patient to call back or return to office if symptoms worsen/change/persist.   Discussed expected course/resolution/complications of diagnosis in detail with patient. Medication risks/benefits/costs/interactions/alternatives discussed with patient. The patient was given an after visit summary which includes diagnoses, current medications, & vitals.    They expressed understanding with the diagnosis and plan.

## 2021-09-17 ENCOUNTER — TELEPHONE (OUTPATIENT)
Dept: NEUROLOGY | Age: 76
End: 2021-09-17

## 2021-09-17 NOTE — TELEPHONE ENCOUNTER
----- Message from [employer] sent at 9/10/2021  9:21 AM EDT -----  Regarding: Dione Hodge telephone  General Message/Vendor Calls    Caller's first and last name: Elke Ricky       Reason for call: Reschedule  testing appt      Callback required yes/no and why: yes      Best contact number(s): [phone]      Details to clarify the request: Pt was schedule on 08/27/2021 at 8:00 but had to cancel due to a broken wrist. Was told pt will get a call back to reschedule testing but has not heard anything back .  Please call pt regarding the reschedule for testing       USEREADY Annalee

## 2021-10-03 LAB
ALBUMIN SERPL-MCNC: 4.4 G/DL (ref 3.7–4.7)
ALBUMIN/GLOB SERPL: 2.2 {RATIO} (ref 1.2–2.2)
ALP SERPL-CCNC: 83 IU/L (ref 44–121)
ALT SERPL-CCNC: 14 IU/L (ref 0–32)
AST SERPL-CCNC: 16 IU/L (ref 0–40)
BASOPHILS # BLD AUTO: 0 X10E3/UL (ref 0–0.2)
BASOPHILS NFR BLD AUTO: 0 %
BILIRUB SERPL-MCNC: 0.3 MG/DL (ref 0–1.2)
BUN SERPL-MCNC: 17 MG/DL (ref 8–27)
BUN/CREAT SERPL: 19 (ref 12–28)
CALCIUM SERPL-MCNC: 10 MG/DL (ref 8.7–10.3)
CHLORIDE SERPL-SCNC: 103 MMOL/L (ref 96–106)
CHOLEST SERPL-MCNC: 195 MG/DL (ref 100–199)
CO2 SERPL-SCNC: 26 MMOL/L (ref 20–29)
CREAT SERPL-MCNC: 0.91 MG/DL (ref 0.57–1)
EOSINOPHIL # BLD AUTO: 0.1 X10E3/UL (ref 0–0.4)
EOSINOPHIL NFR BLD AUTO: 1 %
ERYTHROCYTE [DISTWIDTH] IN BLOOD BY AUTOMATED COUNT: 13.1 % (ref 11.7–15.4)
GLOBULIN SER CALC-MCNC: 2 G/DL (ref 1.5–4.5)
GLUCOSE SERPL-MCNC: 100 MG/DL (ref 65–99)
HCT VFR BLD AUTO: 39 % (ref 34–46.6)
HDLC SERPL-MCNC: 100 MG/DL
HGB BLD-MCNC: 13.4 G/DL (ref 11.1–15.9)
IMM GRANULOCYTES # BLD AUTO: 0.1 X10E3/UL (ref 0–0.1)
IMM GRANULOCYTES NFR BLD AUTO: 1 %
LDLC SERPL CALC-MCNC: 83 MG/DL (ref 0–99)
LYMPHOCYTES # BLD AUTO: 2.7 X10E3/UL (ref 0.7–3.1)
LYMPHOCYTES NFR BLD AUTO: 21 %
MCH RBC QN AUTO: 30.5 PG (ref 26.6–33)
MCHC RBC AUTO-ENTMCNC: 34.4 G/DL (ref 31.5–35.7)
MCV RBC AUTO: 89 FL (ref 79–97)
MONOCYTES # BLD AUTO: 1.2 X10E3/UL (ref 0.1–0.9)
MONOCYTES NFR BLD AUTO: 9 %
NEUTROPHILS # BLD AUTO: 8.9 X10E3/UL (ref 1.4–7)
NEUTROPHILS NFR BLD AUTO: 68 %
PLATELET # BLD AUTO: 347 X10E3/UL (ref 150–450)
POTASSIUM SERPL-SCNC: 3.3 MMOL/L (ref 3.5–5.2)
PROT SERPL-MCNC: 6.4 G/DL (ref 6–8.5)
RBC # BLD AUTO: 4.39 X10E6/UL (ref 3.77–5.28)
SODIUM SERPL-SCNC: 142 MMOL/L (ref 134–144)
TRIGL SERPL-MCNC: 65 MG/DL (ref 0–149)
VLDLC SERPL CALC-MCNC: 12 MG/DL (ref 5–40)
WBC # BLD AUTO: 12.9 X10E3/UL (ref 3.4–10.8)

## 2021-10-05 ENCOUNTER — TELEPHONE (OUTPATIENT)
Dept: INTERNAL MEDICINE CLINIC | Age: 76
End: 2021-10-05

## 2021-10-05 NOTE — TELEPHONE ENCOUNTER
Reason for call:  Pt just began PT for a crushed wrist. They want her to start taking Advil every day and she wants to know if she can continue to take the baby aspirin daily while she's taking the Advil.      Is this a new problem: no  Date of last appointment:  9/3/2021     Can we respond via [employer]: yes  Best call back number: [phone]

## 2021-10-07 NOTE — TELEPHONE ENCOUNTER
Diana Weston MD sent to [facility]  Caller: Unspecified (2 days ago,  3:57 PM)  I would continue her ASA. Lenny Gusman does PT think she needs to take Advil every day?  That should ideally be a PRN medication.      Patient advised to continue Asprin and only take advil as needed until she can discuss further at her upcoming appointment

## 2021-10-11 ENCOUNTER — OFFICE VISIT (OUTPATIENT)
Dept: INTERNAL MEDICINE CLINIC | Age: 76
End: 2021-10-11
Payer: MEDICARE

## 2021-10-11 VITALS
SYSTOLIC BLOOD PRESSURE: 133 MMHG | DIASTOLIC BLOOD PRESSURE: 76 MMHG | WEIGHT: 163.8 LBS | HEIGHT: 60 IN | BODY MASS INDEX: 32.16 KG/M2 | OXYGEN SATURATION: 98 % | RESPIRATION RATE: 16 BRPM | HEART RATE: 74 BPM | TEMPERATURE: 97.8 F

## 2021-10-11 DIAGNOSIS — G25.0 ESSENTIAL TREMOR: ICD-10-CM

## 2021-10-11 DIAGNOSIS — I10 ESSENTIAL HYPERTENSION: ICD-10-CM

## 2021-10-11 DIAGNOSIS — Z12.31 BREAST CANCER SCREENING BY MAMMOGRAM: ICD-10-CM

## 2021-10-11 DIAGNOSIS — R31.9 HEMATURIA, UNSPECIFIED TYPE: ICD-10-CM

## 2021-10-11 DIAGNOSIS — Z00.00 MEDICARE ANNUAL WELLNESS VISIT, SUBSEQUENT: Primary | ICD-10-CM

## 2021-10-11 DIAGNOSIS — E78.2 MIXED HYPERLIPIDEMIA: ICD-10-CM

## 2021-10-11 LAB
BILIRUB UR QL STRIP: NEGATIVE
GLUCOSE UR-MCNC: NEGATIVE MG/DL
KETONES P FAST UR STRIP-MCNC: NEGATIVE MG/DL
PH UR STRIP: 5 [PH] (ref 4.6–8)
PROT UR QL STRIP: NEGATIVE
SP GR UR STRIP: 1.01 (ref 1–1.03)
UA UROBILINOGEN AMB POC: NORMAL (ref 0.2–1)
URINALYSIS CLARITY POC: CLEAR
URINALYSIS COLOR POC: YELLOW
URINE BLOOD POC: NEGATIVE
URINE LEUKOCYTES POC: NEGATIVE
URINE NITRITES POC: NEGATIVE

## 2021-10-11 PROCEDURE — G8510 SCR DEP NEG, NO PLAN REQD: HCPCS | Performed by: INTERNAL MEDICINE

## 2021-10-11 PROCEDURE — 3017F COLORECTAL CA SCREEN DOC REV: CPT | Performed by: INTERNAL MEDICINE

## 2021-10-11 PROCEDURE — G8536 NO DOC ELDER MAL SCRN: HCPCS | Performed by: INTERNAL MEDICINE

## 2021-10-11 PROCEDURE — G8417 CALC BMI ABV UP PARAM F/U: HCPCS | Performed by: INTERNAL MEDICINE

## 2021-10-11 PROCEDURE — G8754 DIAS BP LESS 90: HCPCS | Performed by: INTERNAL MEDICINE

## 2021-10-11 PROCEDURE — G0439 PPPS, SUBSEQ VISIT: HCPCS | Performed by: INTERNAL MEDICINE

## 2021-10-11 PROCEDURE — 81003 URINALYSIS AUTO W/O SCOPE: CPT | Performed by: INTERNAL MEDICINE

## 2021-10-11 PROCEDURE — G8752 SYS BP LESS 140: HCPCS | Performed by: INTERNAL MEDICINE

## 2021-10-11 PROCEDURE — G8399 PT W/DXA RESULTS DOCUMENT: HCPCS | Performed by: INTERNAL MEDICINE

## 2021-10-11 PROCEDURE — G8427 DOCREV CUR MEDS BY ELIG CLIN: HCPCS | Performed by: INTERNAL MEDICINE

## 2021-10-11 PROCEDURE — G9899 SCRN MAM PERF RSLTS DOC: HCPCS | Performed by: INTERNAL MEDICINE

## 2021-10-11 PROCEDURE — 1101F PT FALLS ASSESS-DOCD LE1/YR: CPT | Performed by: INTERNAL MEDICINE

## 2021-10-11 RX ORDER — ROFLUMILAST 500 UG/1
500 TABLET ORAL DAILY
COMMUNITY
Start: 2021-09-14

## 2021-10-11 NOTE — PATIENT INSTRUCTIONS
Medicare Wellness Visit, Female     The best way to live healthy is to have a lifestyle where you eat a well-balanced diet, exercise regularly, limit alcohol use, and quit all forms of tobacco/nicotine, if applicable. Regular preventive services are another way to keep healthy. Preventive services (vaccines, screening tests, monitoring & exams) can help personalize your care plan, which helps you manage your own care. Screening tests can find health problems at the earliest stages, when they are easiest to treat. [facility] follows the current, evidence-based guidelines published by the [facility] ([facility]) when recommending preventive services for our patients. Because we follow these guidelines, sometimes recommendations change over time as research supports it. (For example, mammograms used to be recommended annually. Even though Medicare will still pay for an annual mammogram, the newer guidelines recommend a mammogram every two years for women of average risk). Of course, you and your doctor may decide to screen more often for some diseases, based on your risk and your co-morbidities (chronic disease you are already diagnosed with). Preventive services for you include:  - Medicare offers their members a free annual wellness visit, which is time for you and your primary care provider to discuss and plan for your preventive service needs. Take advantage of this benefit every year!  -All adults over the age of 72 should receive the recommended pneumonia vaccines. Current USPSTF guidelines recommend a series of two vaccines for the best pneumonia protection.   -All adults should have a flu vaccine yearly and a tetanus vaccine every 10 years.   -All adults age 48 and older should receive the shingles vaccines (series of two vaccines).       -All adults age 38-68 who are overweight should have a diabetes screening test once every three years.   -All adults born between 80 and 1965 should be screened once for Hepatitis C.  -Other screening tests and preventive services for persons with diabetes include: an eye exam to screen for diabetic retinopathy, a kidney function test, a foot exam, and stricter control over your cholesterol.   -Cardiovascular screening for adults with routine risk involves an electrocardiogram (ECG) at intervals determined by your doctor.   -Colorectal cancer screenings should be done for adults age 54-65 with no increased risk factors for colorectal cancer. There are a number of acceptable methods of screening for this type of cancer. Each test has its own benefits and drawbacks. Discuss with your doctor what is most appropriate for you during your annual wellness visit. The different tests include: colonoscopy (considered the best screening method), a fecal occult blood test, a fecal DNA test, and sigmoidoscopy.    -A bone mass density test is recommended when a woman turns 65 to screen for osteoporosis. This test is only recommended one time, as a screening. Some providers will use this same test as a disease monitoring tool if you already have osteoporosis. -Breast cancer screenings are recommended every other year for women of normal risk, age 54-69.  -Cervical cancer screenings for women over age 72 are only recommended with certain risk factors.      Here is a list of your current Health Maintenance items (your personalized list of preventive services) with a due date:  Health Maintenance Due   Topic Date Due    COVID-19 Vaccine (1) Never done    Shingles Vaccine (1 of 2) Never done    Mammogram  03/31/2018    Pneumococcal Vaccine (2 of 2 - PPSV23) 10/22/2019    Colorectal Screening  01/02/2021    Yearly Flu Vaccine (1) 09/01/2021         Medicare Wellness Visit, Female     The best way to live healthy is to have a lifestyle where you eat a well-balanced diet, exercise regularly, limit alcohol use, and quit all forms of tobacco/nicotine, if applicable. Regular preventive services are another way to keep healthy. Preventive services (vaccines, screening tests, monitoring & exams) can help personalize your care plan, which helps you manage your own care. Screening tests can find health problems at the earliest stages, when they are easiest to treat. [facility] follows the current, evidence-based guidelines published by the [facility] ([facility]) when recommending preventive services for our patients. Because we follow these guidelines, sometimes recommendations change over time as research supports it. (For example, mammograms used to be recommended annually. Even though Medicare will still pay for an annual mammogram, the newer guidelines recommend a mammogram every two years for women of average risk). Of course, you and your doctor may decide to screen more often for some diseases, based on your risk and your co-morbidities (chronic disease you are already diagnosed with). Preventive services for you include:  - Medicare offers their members a free annual wellness visit, which is time for you and your primary care provider to discuss and plan for your preventive service needs. Take advantage of this benefit every year!  -All adults over the age of 72 should receive the recommended pneumonia vaccines. Current USPSTF guidelines recommend a series of two vaccines for the best pneumonia protection.   -All adults should have a flu vaccine yearly and a tetanus vaccine every 10 years.   -All adults age 48 and older should receive the shingles vaccines (series of two vaccines).       -All adults age 38-68 who are overweight should have a diabetes screening test once every three years.   -All adults born between 80 and 1965 should be screened once for Hepatitis C.  -Other screening tests and preventive services for persons with diabetes include: an eye exam to screen for diabetic retinopathy, a kidney function test, a foot exam, and stricter control over your cholesterol.   -Cardiovascular screening for adults with routine risk involves an electrocardiogram (ECG) at intervals determined by your doctor.   -Colorectal cancer screenings should be done for adults age 54-65 with no increased risk factors for colorectal cancer. There are a number of acceptable methods of screening for this type of cancer. Each test has its own benefits and drawbacks. Discuss with your doctor what is most appropriate for you during your annual wellness visit. The different tests include: colonoscopy (considered the best screening method), a fecal occult blood test, a fecal DNA test, and sigmoidoscopy.    -A bone mass density test is recommended when a woman turns 65 to screen for osteoporosis. This test is only recommended one time, as a screening. Some providers will use this same test as a disease monitoring tool if you already have osteoporosis. -Breast cancer screenings are recommended every other year for women of normal risk, age 54-69.  -Cervical cancer screenings for women over age 72 are only recommended with certain risk factors.      Here is a list of your current Health Maintenance items (your personalized list of preventive services) with a due date:  Health Maintenance Due   Topic Date Due    COVID-19 Vaccine (1) Never done    Shingles Vaccine (1 of 2) Never done    Mammogram  03/31/2018    Pneumococcal Vaccine (2 of 2 - PPSV23) 10/22/2019    Colorectal Screening  01/02/2021    Yearly Flu Vaccine (1) 09/01/2021

## 2021-10-11 NOTE — PROGRESS NOTES
This is the Subsequent Medicare Annual Wellness Exam, performed 12 months or more after the Initial AWV or the last Subsequent AWV    I have reviewed the patient's medical history in detail and updated the computerized patient record. She reports continuing to follow up post her right wrist surgery. She is in therapy for this now. No complaints other than some weakness at the wrist.  She follows up with ortho in November. Assessment/Plan   Education and counseling provided:  Are appropriate based on today's review and evaluation    1. Medicare annual wellness visit, subsequent  2. Essential hypertension  3. Mixed hyperlipidemia  4. Essential tremor  5. Breast cancer screening by mammogram  -     [facility] 3D BREA W MAMMO BI SCREENING INCL CAD; Future  6. Hematuria, unspecified type  -     CULTURE, URINE; Future  -     AMB POC URINALYSIS DIP STICK AUTO W/O MICRO       Depression Risk Factor Screening     3 most recent PHQ Screens 10/11/2021   Little interest or pleasure in doing things Not at all   Feeling down, depressed, irritable, or hopeless Not at all   Total Score PHQ 2 0       Alcohol Risk Screen    Do you average more than 1 drink per night or more than 7 drinks a week:  No    On any one occasion in the past three months have you have had more than 3 drinks containing alcohol:  No        Functional Ability and Level of Safety    Hearing: Hearing is good. Done March 25th 2021. No changes since previous year      Activities of Daily Living: The home contains: no safety equipment. Patient does total self care      Ambulation: with mild difficulty     Fall Risk:  Fall Risk Assessment, last 12 mths 10/11/2021   Able to walk? Yes   Fall in past 12 months? 1   Do you feel unsteady? 0   Are you worried about falling 0   Is TUG test greater than 12 seconds? 0   Is the gait abnormal? 0   Number of falls in past 12 months 0   Fall with injury?  1      Abuse Screen:  Patient is not abused       Cognitive Screening    Has your family/caregiver stated any concerns about your memory: yes - she has been referred to neuropsychology for this. Had to reschedule due to wrist fracture. Health Maintenance Due     Health Maintenance Due   Topic Date Due    COVID-19 Vaccine (1) Never done    Shingrix Vaccine Age 50> (1 of 2) Never done    Breast Cancer Screen Mammogram  2018    Pneumococcal 65+ years (2 of 2 - PPSV23) 10/22/2019    Colorectal Cancer Screening Combo  2021    Flu Vaccine (1) 2021       Patient Care Team   Patient Care Team:  Sen Mchugh MD as PCP - General (Internal Medicine)  Sen Mchugh MD as PCP - [address] [place] [address] [facility]  Allison Grant MD (Neurology)  Neal Halsted, MD as Physician (Cardiology)    History     Patient Active Problem List   Diagnosis Code    HTN (hypertension) I10    Tachyarrhythmia R00.0    Mitral valve prolapse I34.1    Hyperlipidemia E78.5    Obesity (BMI 30.0-34. 9) E66.9    Cyst of brain G93.0    Cryptogenic partial complex epilepsy ([facility] [address] G40.209    Essential tremor G25.0    COPD (chronic obstructive pulmonary disease) (HCC) J44.9    Closed fracture of right distal radius S52.501A     Past Medical History:   Diagnosis Date    Arrhythmia     Chronic obstructive pulmonary disease (HCC)     Hyperlipidemia     Hypertension     Seizures (HCC)     Vertigo       Past Surgical History:   Procedure Laterality Date    HX BREAST LUMPECTOMY Left     pt stated over 10 years. in 90's.  HX CATARACT REMOVAL      Both eyes    HX  SECTION      HX ORTHOPAEDIC      trigger finger    HX WISDOM TEETH EXTRACTION      HX WRIST FRACTURE TX Right 2021     Current Outpatient Medications   Medication Sig Dispense Refill    Daliresp 500 mcg tab tablet Take 500 mcg by mouth daily.  lamoTRIgine (LaMICtal) 150 mg tablet Take 150 mg by mouth two (2) times a day.       predniSONE (DELTASONE) 10 mg tablet Take 10 mg by mouth daily.  azithromycin (ZITHROMAX) 250 mg tablet Take 250 mg by mouth See Admin Instructions. Monday. Wednesday, Friday      simvastatin (ZOCOR) 20 mg tablet TAKE 1 TABLET EVERY NIGHT 90 Tablet 1    dilTIAZem ER (CARDIZEM CD) 120 mg capsule TAKE 1 CAPSULE EVERY DAY 90 Cap 1    calcium carbonate (CALTREX) 600 mg calcium (1,500 mg) tablet 1,200 mg.      albuterol (PROVENTIL HFA, VENTOLIN HFA, PROAIR HFA) 90 mcg/actuation inhaler INHALE 1 PUFF EVERY FOUR HOURS AS NEEDED FOR WHEEZING OR SHORTNESS OF BREATH. 18 g 1    albuterol-ipratropium (DUO-NEB) 2.5 mg-0.5 mg/3 ml nebu INHALE 1 VIAL VIA NEBULIZER FOUR TIMES DAILY AS NEEDED      cholecalciferol (VITAMIN D3) (2,000 UNITS /50 MCG) cap capsule Take 1 Cap by mouth daily.  fluticasone/umeclidin/vilanter (TRELEGY ELLIPTA IN) Take 1 Puff by inhalation daily.  aspirin delayed-release 81 mg tablet Take 81 mg by mouth daily.  meclizine (ANTIVERT) 25 mg tablet Take 1 Tab by mouth three (3) times daily as needed.  (Patient taking differently: Take 25 mg by mouth three (3) times daily as needed for Dizziness.) 30 Tab 1     Allergies   Allergen Reactions    Hydrocodone Nausea and Vomiting    Amoxicillin Hives    Oxycodone Nausea and Vomiting       Family History   Problem Relation Age of Onset    Heart Disease Father     Stroke Father     Cancer Paternal Aunt         breast    Cancer Maternal Grandmother     Cancer Paternal Grandmother         colon    Cancer Son         colon    Cancer Paternal Aunt         breast     Social History     Tobacco Use    Smoking status: Current Every Day Smoker     Packs/day: 0.50     Years: 50.00     Pack years: 25.00     Types: Cigarettes    Smokeless tobacco: Never Used    Tobacco comment: sometimes less per pt statement   Substance Use Topics    Alcohol use: No         Kwame Herrmann MD

## 2021-10-11 NOTE — PROGRESS NOTES
Chief Complaint   [facility]   [facility]     Reviewed record in preparation for visit and have obtained necessary documentation. Identified pt with two pt identifiers(name and ). Health Maintenance Due   Topic    COVID-19 Vaccine (1)    Shingrix Vaccine Age 49> (1 of 2)    Breast Cancer Screen Mammogram     Pneumococcal 65+ years (2 of 2 - PPSV23)    Colorectal Cancer Screening Combo     Flu Vaccine (1)    Medicare Yearly Exam          Chief Complaint   Patient presents with   [facility] Me Annual Wellness Visit        Wt Readings from Last 3 Encounters:   10/11/21 163 lb 12.8 oz (74.3 kg)   21 162 lb (73.5 kg)   21 158 lb (71.7 kg)     Temp Readings from Last 3 Encounters:   10/11/21 97.8 °F (36.6 °C) (Temporal)   21 97.1 °F (36.2 °C) (Temporal)   21 97.6 °F (36.4 °C)     BP Readings from Last 3 Encounters:   10/11/21 133/76   21 111/70   21 133/65     Pulse Readings from Last 3 Encounters:   10/11/21 74   21 60   21 61           Learning Assessment:  :     Learning Assessment 2/10/2021 2019 2017 2017 2015 2014   PRIMARY LEARNER Patient Patient Patient Patient Patient Patient   HIGHEST LEVEL OF EDUCATION - PRIMARY LEARNER  - - - - - [address] [employer] LEARNER - - - - - [address] CAREGIVER - - - - - No   PRIMARY [facility]   LEARNER PREFERENCE PRIMARY LISTENING DEMONSTRATION READING READING READING READING   ANSWERED BY patient self patient patient patient patient   RELATIONSHIP SELF SELF SELF SELF SELF SELF       Depression Screening:  :     3 most recent PHQ Screens 10/11/2021   Little interest or pleasure in doing things Not at all   Feeling down, depressed, irritable, or hopeless Not at all   Total Score PHQ 2 0       Fall Risk Assessment:  :     Fall Risk Assessment, last 12 mths 10/11/2021   Able to walk?  Yes   Fall in past 12 months? 1   Do you feel unsteady? 0   Are you worried about falling 0   Is TUG test greater than 12 seconds? 0   Is the gait abnormal? 0   Number of falls in past 12 months 0   Fall with injury? 1       Abuse Screening:  :     Abuse Screening Questionnaire 9/3/2021 10/22/2018 7/20/2018 10/10/2017   Do you ever feel afraid of your partner? N N N N   Are you in a relationship with someone who physically or mentally threatens you? N N N N   Is it safe for you to go home? Y Y Y Y       Coordination of Care Questionnaire:  :     1) Have you been to an emergency room, urgent care clinic since your last visit? no   Hospitalized since your last visit? no             2) Have you seen or consulted any other health care providers outside of [address] [place] [address] since your last visit? yes  (Include any pap smears or colon screenings in this section.)    3) Do you have an Advance Directive on file? no    4) Are you interested in receiving information on Advance Directives? NO      Patient is accompanied by daughter-in-law I have received verbal consent from Brian Diaz to discuss any/all medical information while they are present in the room. Reviewed record  In preparation for visit and have obtained necessary documentation.

## 2021-10-12 DIAGNOSIS — R82.71 BACTERIA IN URINE: Primary | ICD-10-CM

## 2021-10-12 RX ORDER — NITROFURANTOIN 25; 75 MG/1; MG/1
100 CAPSULE ORAL 2 TIMES DAILY
Qty: 10 CAPSULE | Refills: 0 | Status: SHIPPED | OUTPATIENT
Start: 2021-10-12 | End: 2021-10-17

## 2021-10-12 NOTE — PROGRESS NOTES
Patient notified urine culture showed bacteria. Per Dr Anita Gunn prescription sent to pharmacy.   [facility]

## 2021-10-12 NOTE — PROGRESS NOTES
Pt has bacteria on the culture. Will begin Macrobid for now. Await culture.   I will send the medication

## 2021-10-14 ENCOUNTER — TELEPHONE (OUTPATIENT)
Dept: INTERNAL MEDICINE CLINIC | Age: 76
End: 2021-10-14

## 2021-10-14 LAB
BACTERIA SPEC CULT: ABNORMAL
BACTERIA SPEC CULT: ABNORMAL
CC UR VC: ABNORMAL
SERVICE CMNT-IMP: ABNORMAL

## 2021-10-14 NOTE — TELEPHONE ENCOUNTER
Yousif Randhawa from [address] [place] [address] Dept. Called with a critical urine culture , it showed esbl ecoli. Advised Dr. Lyndon Appiah and he states that the patient is on Microbid and that will cover the ecoli. The patient has been advised.

## 2021-10-27 ENCOUNTER — HOSPITAL ENCOUNTER (OUTPATIENT)
Dept: MAMMOGRAPHY | Age: 76
Discharge: HOME | End: 2021-10-27
Payer: MEDICARE

## 2021-10-27 DIAGNOSIS — Z12.31 BREAST CANCER SCREENING BY MAMMOGRAM: ICD-10-CM

## 2021-10-27 PROCEDURE — 77063 BREAST TOMOSYNTHESIS BI: CPT

## 2021-11-12 DIAGNOSIS — I10 ESSENTIAL HYPERTENSION: ICD-10-CM

## 2021-11-12 RX ORDER — DILTIAZEM HYDROCHLORIDE 120 MG/1
CAPSULE, COATED, EXTENDED RELEASE ORAL
Qty: 90 CAPSULE | Refills: 1 | Status: SHIPPED | OUTPATIENT
Start: 2021-11-12 | End: 2022-05-10 | Stop reason: SDUPTHER

## 2022-01-26 ENCOUNTER — OFFICE VISIT (OUTPATIENT)
Dept: INTERNAL MEDICINE CLINIC | Age: 77
End: 2022-01-26
Payer: MEDICARE

## 2022-01-26 VITALS
BODY MASS INDEX: 30.94 KG/M2 | HEART RATE: 91 BPM | RESPIRATION RATE: 16 BRPM | SYSTOLIC BLOOD PRESSURE: 130 MMHG | WEIGHT: 157.6 LBS | DIASTOLIC BLOOD PRESSURE: 88 MMHG | HEIGHT: 60 IN | OXYGEN SATURATION: 97 % | TEMPERATURE: 97.5 F

## 2022-01-26 DIAGNOSIS — R53.83 FATIGUE, UNSPECIFIED TYPE: ICD-10-CM

## 2022-01-26 DIAGNOSIS — R53.83 FATIGUE, UNSPECIFIED TYPE: Primary | ICD-10-CM

## 2022-01-26 LAB
ALBUMIN SERPL-MCNC: 3.2 G/DL (ref 3.5–5)
ALBUMIN/GLOB SERPL: 1 {RATIO} (ref 1.1–2.2)
ALP SERPL-CCNC: 87 U/L (ref 45–117)
ALT SERPL-CCNC: 28 U/L (ref 12–78)
ANION GAP SERPL CALC-SCNC: 3 MMOL/L (ref 5–15)
AST SERPL-CCNC: 16 U/L (ref 15–37)
BASOPHILS # BLD: 0 K/UL (ref 0–0.1)
BASOPHILS NFR BLD: 1 % (ref 0–1)
BILIRUB SERPL-MCNC: 0.4 MG/DL (ref 0.2–1)
BUN SERPL-MCNC: 19 MG/DL (ref 6–20)
BUN/CREAT SERPL: 25 (ref 12–20)
CALCIUM SERPL-MCNC: 9.5 MG/DL (ref 8.5–10.1)
CHLORIDE SERPL-SCNC: 109 MMOL/L (ref 97–108)
CO2 SERPL-SCNC: 26 MMOL/L (ref 21–32)
CREAT SERPL-MCNC: 0.77 MG/DL (ref 0.55–1.02)
DIFFERENTIAL METHOD BLD: ABNORMAL
EOSINOPHIL # BLD: 0 K/UL (ref 0–0.4)
EOSINOPHIL NFR BLD: 1 % (ref 0–7)
ERYTHROCYTE [DISTWIDTH] IN BLOOD BY AUTOMATED COUNT: 15.1 % (ref 11.5–14.5)
GLOBULIN SER CALC-MCNC: 3.3 G/DL (ref 2–4)
GLUCOSE SERPL-MCNC: 100 MG/DL (ref 65–100)
HCT VFR BLD AUTO: 38.3 % (ref 35–47)
HGB BLD-MCNC: 12.6 G/DL (ref 11.5–16)
IMM GRANULOCYTES # BLD AUTO: 0 K/UL (ref 0–0.04)
IMM GRANULOCYTES NFR BLD AUTO: 0 % (ref 0–0.5)
LYMPHOCYTES # BLD: 1.3 K/UL (ref 0.8–3.5)
LYMPHOCYTES NFR BLD: 29 % (ref 12–49)
MCH RBC QN AUTO: 30.4 PG (ref 26–34)
MCHC RBC AUTO-ENTMCNC: 32.9 G/DL (ref 30–36.5)
MCV RBC AUTO: 92.5 FL (ref 80–99)
MONOCYTES # BLD: 0.4 K/UL (ref 0–1)
MONOCYTES NFR BLD: 8 % (ref 5–13)
NEUTS SEG # BLD: 2.7 K/UL (ref 1.8–8)
NEUTS SEG NFR BLD: 61 % (ref 32–75)
NRBC # BLD: 0 K/UL (ref 0–0.01)
NRBC BLD-RTO: 0 PER 100 WBC
PLATELET # BLD AUTO: 178 K/UL (ref 150–400)
PMV BLD AUTO: 12.1 FL (ref 8.9–12.9)
POTASSIUM SERPL-SCNC: 4 MMOL/L (ref 3.5–5.1)
PROT SERPL-MCNC: 6.5 G/DL (ref 6.4–8.2)
RBC # BLD AUTO: 4.14 M/UL (ref 3.8–5.2)
SODIUM SERPL-SCNC: 138 MMOL/L (ref 136–145)
WBC # BLD AUTO: 4.4 K/UL (ref 3.6–11)

## 2022-01-26 PROCEDURE — G8417 CALC BMI ABV UP PARAM F/U: HCPCS | Performed by: INTERNAL MEDICINE

## 2022-01-26 PROCEDURE — G8754 DIAS BP LESS 90: HCPCS | Performed by: INTERNAL MEDICINE

## 2022-01-26 PROCEDURE — 1101F PT FALLS ASSESS-DOCD LE1/YR: CPT | Performed by: INTERNAL MEDICINE

## 2022-01-26 PROCEDURE — G8427 DOCREV CUR MEDS BY ELIG CLIN: HCPCS | Performed by: INTERNAL MEDICINE

## 2022-01-26 PROCEDURE — 1090F PRES/ABSN URINE INCON ASSESS: CPT | Performed by: INTERNAL MEDICINE

## 2022-01-26 PROCEDURE — 99214 OFFICE O/P EST MOD 30 MIN: CPT | Performed by: INTERNAL MEDICINE

## 2022-01-26 PROCEDURE — G8752 SYS BP LESS 140: HCPCS | Performed by: INTERNAL MEDICINE

## 2022-01-26 PROCEDURE — 93000 ELECTROCARDIOGRAM COMPLETE: CPT | Performed by: INTERNAL MEDICINE

## 2022-01-26 PROCEDURE — G8432 DEP SCR NOT DOC, RNG: HCPCS | Performed by: INTERNAL MEDICINE

## 2022-01-26 PROCEDURE — G8399 PT W/DXA RESULTS DOCUMENT: HCPCS | Performed by: INTERNAL MEDICINE

## 2022-01-26 PROCEDURE — 3017F COLORECTAL CA SCREEN DOC REV: CPT | Performed by: INTERNAL MEDICINE

## 2022-01-26 PROCEDURE — G8536 NO DOC ELDER MAL SCRN: HCPCS | Performed by: INTERNAL MEDICINE

## 2022-01-26 NOTE — PROGRESS NOTES
Acute Care Note    Makayla Pope is 77 y.o. female. she presents for evaluation of Hypertension (blood pressure low)    The patient was resting yesterday. Got up and vacuumed the floors. She was extremely fatigued after this. She felt like she needed to sit and rest.  She did not feel short of breath. She denies any significant pain as well. She was checking her pulse ox and noted that it never dropped below 97%    She also felt a \"quivering\" in her chest like jello. She denies chest pain, numbness, tingling or vision disturbance. She reports feeling well today. She has not had a recurrence of the symptom since that time. Prior to Admission medications    Medication Sig Start Date End Date Taking? Authorizing Provider   lamoTRIgine (LaMICtal) 150 mg tablet TAKE 1 TABLET BY MOUTH TWICE A DAY 12/31/21  Yes Minnie Soto MD   dilTIAZem ER (CARDIZEM CD) 120 mg capsule TAKE 1 CAPSULE EVERY DAY 11/12/21  Yes Kayli Lerma MD   meclizine (ANTIVERT) 25 mg tablet Take 1 Tablet by mouth three (3) times daily as needed for Dizziness (as needed). 11/1/21  Yes Kayli Lerma MD   Daliresp 500 mcg tab tablet Take 500 mcg by mouth daily. 9/14/21  Yes Provider, Historical   predniSONE (DELTASONE) 10 mg tablet Take 10 mg by mouth daily. Yes Provider, Historical   azithromycin (ZITHROMAX) 250 mg tablet Take 250 mg by mouth See Admin Instructions. Monday. Wednesday, Friday   Yes Provider, Historical   simvastatin (ZOCOR) 20 mg tablet TAKE 1 TABLET EVERY NIGHT 8/16/21  Yes Kyali Lerma MD   calcium carbonate (CALTREX) 600 mg calcium (1,500 mg) tablet 1,200 mg. Yes Provider, Historical   albuterol (PROVENTIL HFA, VENTOLIN HFA, PROAIR HFA) 90 mcg/actuation inhaler INHALE 1 PUFF EVERY FOUR HOURS AS NEEDED FOR WHEEZING OR SHORTNESS OF BREATH. 1/27/21  Yes Kayli Lerma MD   cholecalciferol (VITAMIN D3) (2,000 UNITS /50 MCG) cap capsule Take 1 Cap by mouth daily.    Yes Provider, Historical fluticasone/umeclidin/vilanter (TRELEGY ELLIPTA IN) Take 1 Puff by inhalation daily. Yes Provider, Historical   aspirin delayed-release 81 mg tablet Take 81 mg by mouth daily. Yes Provider, Historical   albuterol-ipratropium (DUO-NEB) 2.5 mg-0.5 mg/3 ml nebu INHALE 1 VIAL VIA NEBULIZER FOUR TIMES DAILY AS NEEDED 7/4/20   Provider, Historical         Patient Active Problem List   Diagnosis Code    HTN (hypertension) I10    Tachyarrhythmia R00.0    Mitral valve prolapse I34.1    Hyperlipidemia E78.5    Obesity (BMI 30.0-34. 9) E66.9    Cyst of brain G93.0    Cryptogenic partial complex epilepsy (HCC) G40.209    Essential tremor G25.0    COPD (chronic obstructive pulmonary disease) (HCC) J44.9    Closed fracture of right distal radius S52.501A         Review of Systems   Constitutional: Positive for malaise/fatigue. Respiratory: Negative. Cardiovascular: Negative. Visit Vitals  /88 (BP 1 Location: Left arm, BP Patient Position: Sitting, BP Cuff Size: Adult)   Pulse 91   Temp 97.5 °F (36.4 °C) (Temporal)   Resp 16   Ht 5' (1.524 m)   Wt 157 lb 9.6 oz (71.5 kg)   SpO2 97%   BMI 30.78 kg/m²       Physical Exam  Constitutional:       Appearance: She is well-developed. Cardiovascular:      Rate and Rhythm: Normal rate and regular rhythm. Pulmonary:      Effort: Pulmonary effort is normal.      Breath sounds: Normal breath sounds. EKG: incomplete R bundle with 1 PAC        ASSESSMENT/PLAN  Diagnoses and all orders for this visit:    1. Fatigue, unspecified type - Unclear reason for the episodic fatigue. This may be paroxysmal atrial fibrillation vs some other arrhythmia. We will check a holter and labs. Plan to have the patient follow up with cardiology as well. She has seen Dr. Jason Cuello in the past.   -     AMB POC EKG ROUTINE W/ 12 LEADS, INTER & REP  -     CARDIAC HOLTER MONITOR; Future  -     CBC WITH AUTOMATED DIFF; Future  -     METABOLIC PANEL, COMPREHENSIVE;  Future Advised the patient to call back or return to office if symptoms worsen/change/persist.   Discussed expected course/resolution/complications of diagnosis in detail with patient. Medication risks/benefits/costs/interactions/alternatives discussed with patient. The patient was given an after visit summary which includes diagnoses, current medications, & vitals. They expressed understanding with the diagnosis and plan.

## 2022-01-26 NOTE — PROGRESS NOTES
Chief Complaint   Patient presents with    Hypertension     blood pressure low     Reviewed record in preparation for visit and have obtained necessary documentation. Identified pt with two pt identifiers(name and ). Health Maintenance Due   Topic    COVID-19 Vaccine (1)    Shingrix Vaccine Age 49> (1 of 2)    Pneumococcal 65+ years (2 of 2 - PPSV23)    Colorectal Cancer Screening Combo          Chief Complaint   Patient presents with    Hypertension     blood pressure low        Wt Readings from Last 3 Encounters:   22 157 lb 9.6 oz (71.5 kg)   10/11/21 163 lb 12.8 oz (74.3 kg)   21 162 lb (73.5 kg)     Temp Readings from Last 3 Encounters:   22 97.5 °F (36.4 °C) (Temporal)   10/11/21 97.8 °F (36.6 °C) (Temporal)   21 97.1 °F (36.2 °C) (Temporal)     BP Readings from Last 3 Encounters:   22 130/88   10/11/21 133/76   21 111/70     Pulse Readings from Last 3 Encounters:   22 91   10/11/21 74   21 60           Learning Assessment:  :     Learning Assessment 2/10/2021 2019 2017 2017 2015 2014   PRIMARY LEARNER Patient Patient Patient Patient Patient Patient   HIGHEST LEVEL OF EDUCATION - PRIMARY LEARNER  - - - - - GRADUATED HIGH SCHOOL OR GED   BARRIERS PRIMARY LEARNER - - - - - [address] CAREGIVER - - - - - No   PRIMARY LANGUAGE ENGLISH ENGLISH ENGLISH ENGLISH ENGLISH ENGLISH   LEARNER PREFERENCE PRIMARY LISTENING DEMONSTRATION READING READING READING READING   ANSWERED BY patient self patient patient patient patient   RELATIONSHIP SELF SELF SELF SELF SELF SELF       Depression Screening:  :     3 most recent PHQ Screens 10/11/2021   Little interest or pleasure in doing things Not at all   Feeling down, depressed, irritable, or hopeless Not at all   Total Score PHQ 2 0       Fall Risk Assessment:  :     Fall Risk Assessment, last 12 mths 2022   Able to walk? Yes   Fall in past 12 months? 0   Do you feel unsteady? 0   Are you worried about falling 0   Is TUG test greater than 12 seconds? -   Is the gait abnormal? -   Number of falls in past 12 months -   Fall with injury? -       Abuse Screening:  :     Abuse Screening Questionnaire 1/26/2022 9/3/2021 10/22/2018 7/20/2018 10/10/2017   Do you ever feel afraid of your partner? N N N N N   Are you in a relationship with someone who physically or mentally threatens you? N N N N N   Is it safe for you to go home? Y Y Y Y Y       Coordination of Care Questionnaire:  :     1) Have you been to an emergency room, urgent care clinic since your last visit? no   Hospitalized since your last visit? no             2) Have you seen or consulted any other health care providers outside of [address] [place] [address] since your last visit? no  (Include any pap smears or colon screenings in this section.)    3) Do you have an Advance Directive on file? no    4) Are you interested in receiving information on Advance Directives? NO      Patient is accompanied by daughter I have received verbal consent from Yoav Thompson to discuss any/all medical information while they are present in the room. Reviewed record  In preparation for visit and have obtained necessary documentation.

## 2022-02-01 ENCOUNTER — HOSPITAL ENCOUNTER (OUTPATIENT)
Dept: NON INVASIVE DIAGNOSTICS | Age: 77
Discharge: HOME | End: 2022-02-01
Payer: MEDICARE

## 2022-02-01 DIAGNOSIS — R53.83 FATIGUE, UNSPECIFIED TYPE: ICD-10-CM

## 2022-02-01 PROCEDURE — 93225 XTRNL ECG REC<48 HRS REC: CPT

## 2022-02-04 NOTE — PROCEDURES
Patient was monitored for a total of 48 hours. Baseline sinus rhythm  Heart rate varies from 62 at 0512  to 100 at 1827 and a both sinus in origin. Frequent APCs and PVCs. Single atrial run of 8 beats at 125 bpm  No VT or pauses. Patient complained of chest tightness with heart rate in the 90s, difficult to discern ST-T changes.

## 2022-02-21 ENCOUNTER — TELEPHONE (OUTPATIENT)
Dept: INTERNAL MEDICINE CLINIC | Age: 77
End: 2022-02-21

## 2022-02-21 NOTE — TELEPHONE ENCOUNTER
Reason for call:   At last appt dr Kevin King told pt that he was going to get her an appt with dr Ramos Awad and they have not heard anytning else    Is this a new problem: yes     Date of last appointment:  1/26/2022     Can we respond via [facility]: no    Best call back number:    Adonna Ar [phone]

## 2022-02-22 NOTE — TELEPHONE ENCOUNTER
Returned call to [phone]. Person answering the phone stated I had wrong number. Attempted to contact patient Jose Luis Traylor [phone]. Received voicemail. Left message to return a call. Dr Rob Ohara asked patient to schedule an appointment with DR Makayla Lutz. She is an established patient in the office.

## 2022-02-28 DIAGNOSIS — E78.2 MIXED HYPERLIPIDEMIA: ICD-10-CM

## 2022-02-28 DIAGNOSIS — R42 DIZZINESS: ICD-10-CM

## 2022-02-28 RX ORDER — MECLIZINE HYDROCHLORIDE 25 MG/1
25 TABLET ORAL
Qty: 60 TABLET | Refills: 1 | Status: SHIPPED | OUTPATIENT
Start: 2022-02-28 | End: 2022-07-05 | Stop reason: SDUPTHER

## 2022-02-28 RX ORDER — SIMVASTATIN 20 MG/1
20 TABLET, FILM COATED ORAL
Qty: 90 TABLET | Refills: 1 | Status: SHIPPED | OUTPATIENT
Start: 2022-02-28 | End: 2022-07-18

## 2022-03-09 ENCOUNTER — TELEPHONE (OUTPATIENT)
Dept: CARDIOLOGY CLINIC | Age: 77
End: 2022-03-09

## 2022-03-09 ENCOUNTER — OFFICE VISIT (OUTPATIENT)
Dept: CARDIOLOGY CLINIC | Age: 77
End: 2022-03-09
Payer: MEDICARE

## 2022-03-09 VITALS
HEIGHT: 60 IN | RESPIRATION RATE: 14 BRPM | HEART RATE: 74 BPM | DIASTOLIC BLOOD PRESSURE: 80 MMHG | BODY MASS INDEX: 31.69 KG/M2 | OXYGEN SATURATION: 97 % | WEIGHT: 161.4 LBS | SYSTOLIC BLOOD PRESSURE: 130 MMHG

## 2022-03-09 DIAGNOSIS — I49.3 PVC (PREMATURE VENTRICULAR CONTRACTION): ICD-10-CM

## 2022-03-09 DIAGNOSIS — R00.2 PALPITATIONS: ICD-10-CM

## 2022-03-09 DIAGNOSIS — I10 PRIMARY HYPERTENSION: Primary | ICD-10-CM

## 2022-03-09 DIAGNOSIS — J43.9 PULMONARY EMPHYSEMA, UNSPECIFIED EMPHYSEMA TYPE (HCC): ICD-10-CM

## 2022-03-09 PROCEDURE — 1090F PRES/ABSN URINE INCON ASSESS: CPT | Performed by: SPECIALIST

## 2022-03-09 PROCEDURE — G8510 SCR DEP NEG, NO PLAN REQD: HCPCS | Performed by: SPECIALIST

## 2022-03-09 PROCEDURE — G8399 PT W/DXA RESULTS DOCUMENT: HCPCS | Performed by: SPECIALIST

## 2022-03-09 PROCEDURE — 3017F COLORECTAL CA SCREEN DOC REV: CPT | Performed by: SPECIALIST

## 2022-03-09 PROCEDURE — G8427 DOCREV CUR MEDS BY ELIG CLIN: HCPCS | Performed by: SPECIALIST

## 2022-03-09 PROCEDURE — G8536 NO DOC ELDER MAL SCRN: HCPCS | Performed by: SPECIALIST

## 2022-03-09 PROCEDURE — G8417 CALC BMI ABV UP PARAM F/U: HCPCS | Performed by: SPECIALIST

## 2022-03-09 PROCEDURE — G8754 DIAS BP LESS 90: HCPCS | Performed by: SPECIALIST

## 2022-03-09 PROCEDURE — G8752 SYS BP LESS 140: HCPCS | Performed by: SPECIALIST

## 2022-03-09 PROCEDURE — 1101F PT FALLS ASSESS-DOCD LE1/YR: CPT | Performed by: SPECIALIST

## 2022-03-09 PROCEDURE — 99214 OFFICE O/P EST MOD 30 MIN: CPT | Performed by: SPECIALIST

## 2022-03-09 NOTE — TELEPHONE ENCOUNTER
----- Message from Dominik Roberts RN sent at 3/9/2022 11:10 AM EST -----  Regarding: safemail  Dr. Chikis Berry would like to have a 30 day event monitor mailed to patient. Dx palpitations, dizziness. Thanks!   Jewell Shoemaker

## 2022-03-09 NOTE — PROGRESS NOTES
HISTORY OF PRESENT ILLNESS  Felipe Shea is a 77 y.o. female     SUMMARY:   Problem List  Date Reviewed: 3/9/2022          Codes Class Noted    Closed fracture of right distal radius ICD-10-CM: S52.501A  ICD-9-CM: 813.42  8/24/2021        COPD (chronic obstructive pulmonary disease) ([facility] ICD-10-CM: J44.9  ICD-9-CM: 042  7/20/2018        Cryptogenic partial complex epilepsy ([facility] ICD-10-CM: G40.209  ICD-9-CM: 345.50  12/14/2017        Essential tremor ICD-10-CM: G25.0  ICD-9-CM: 333.1  12/14/2017        Cyst of brain ICD-10-CM: G93.0  ICD-9-CM: 348.0  12/14/2016        HTN (hypertension) ICD-10-CM: I10  ICD-9-CM: 401.9  11/20/2014        Tachyarrhythmia ICD-10-CM: R00.0  ICD-9-CM: 785.0  11/20/2014        Mitral valve prolapse ICD-10-CM: I34.1  ICD-9-CM: 424.0  11/20/2014        Hyperlipidemia ICD-10-CM: E78.5  ICD-9-CM: 272.4  11/20/2014        Obesity (BMI 30.0-34.9) ICD-10-CM: E66.9  ICD-9-CM: 278.00  11/20/2014              Current Outpatient Medications on File Prior to Visit   Medication Sig    meclizine (ANTIVERT) 25 mg tablet Take 1 Tablet by mouth three (3) times daily as needed for Dizziness (as needed).  simvastatin (ZOCOR) 20 mg tablet Take 1 Tablet by mouth nightly.  lamoTRIgine (LaMICtal) 150 mg tablet TAKE 1 TABLET BY MOUTH TWICE A DAY    dilTIAZem ER (CARDIZEM CD) 120 mg capsule TAKE 1 CAPSULE EVERY DAY    Daliresp 500 mcg tab tablet Take 500 mcg by mouth daily.  predniSONE (DELTASONE) 10 mg tablet Take 10 mg by mouth daily.  azithromycin (ZITHROMAX) 250 mg tablet Take 250 mg by mouth See Admin Instructions. Monday. Wednesday, Friday    calcium carbonate (CALTREX) 600 mg calcium (1,500 mg) tablet 1,200 mg.    albuterol (PROVENTIL HFA, VENTOLIN HFA, PROAIR HFA) 90 mcg/actuation inhaler INHALE 1 PUFF EVERY FOUR HOURS AS NEEDED FOR WHEEZING OR SHORTNESS OF BREATH.     albuterol-ipratropium (DUO-NEB) 2.5 mg-0.5 mg/3 ml nebu INHALE 1 VIAL VIA NEBULIZER FOUR TIMES DAILY AS NEEDED    cholecalciferol (VITAMIN D3) (2,000 UNITS /50 MCG) cap capsule Take 1 Cap by mouth daily.  fluticasone/umeclidin/vilanter (TRELEGY ELLIPTA IN) Take 1 Puff by inhalation daily.  aspirin delayed-release 81 mg tablet Take 81 mg by mouth daily. No current facility-administered medications on file prior to visit. CARDIOLOGY STUDIES TO DATE:  4/13 negative lexiscan cardiolyte  7/14 normal echo  10/18 lae, sclerotic non stenotic av, otherwise normal echo  7/19 negative lexiscan  8/20 echo fatuma mas, pa pressure 40mm otherwise normal  2/22 holter, freq pvcs, pacs, 1 short atrial run    Chief Complaint   Patient presents with    Follow-up     HPI :  She is referred back from her primary care for cardiac evaluation. A few months ago she began to notice a fairly frequent palpitations maybe even some tachycardia associated with lightheadedness and dizziness and the feeling that she might actually faint. She finally went to her PCP in January and a Holter was ordered which I reviewed and summarized above. She did not have any symptoms while she was wearing a 48-hour monitor. She is checked her blood pressure at home with a wrist cuff from time to time at a time her systolics will be in the 41H when she is feeling weak and lightheaded. She has chronic dyspnea on exertion, and continues to smoke.   CARDIAC ROS:   negative for chest pain, syncope, orthopnea, paroxysmal nocturnal dyspnea, exertional chest pressure/discomfort, claudication, lower extremity edema    Family History   Problem Relation Age of Onset    Heart Disease Father     Stroke Father     Cancer Paternal Aunt         breast    Cancer Maternal Grandmother     Cancer Paternal Grandmother         colon    Cancer Son         colon    Cancer Paternal Aunt         breast       Past Medical History:   Diagnosis Date    Arrhythmia     [facility]     Hyperlipidemia     Hypertension     Seizures ([address]     Vertigo        GENERAL ROS:  A comprehensive review of systems was negative except for that written in the HPI.     Visit Vitals  /80 (BP 1 Location: Right arm, BP Patient Position: Sitting, BP Cuff Size: Adult)   Pulse 74   Resp 14   Ht 5' (1.524 m)   Wt 161 lb 6.4 oz (73.2 kg)   SpO2 97%   BMI 31.52 kg/m²       Wt Readings from Last 3 Encounters:   03/09/22 161 lb 6.4 oz (73.2 kg)   01/26/22 157 lb 9.6 oz (71.5 kg)   10/11/21 163 lb 12.8 oz (74.3 kg)            BP Readings from Last 3 Encounters:   03/09/22 130/80   01/26/22 130/88   10/11/21 133/76       PHYSICAL EXAM  General appearance: alert, cooperative, no distress, appears stated age  Neurologic: Alert and oriented X 3  Neck: supple, symmetrical, trachea midline, no adenopathy, no carotid bruit and no JVD  Lungs: clear to auscultation bilaterally  Heart: regular rate and rhythm, S1, S2 normal, no murmur, click, rub or gallop  Extremities: extremities normal, atraumatic, no cyanosis or edema    Lab Results   Component Value Date/Time    Cholesterol, total 195 10/02/2021 07:36 AM    Cholesterol, total 171 10/10/2020 07:39 AM    Cholesterol, total 175 12/14/2019 07:43 AM    Cholesterol, total 181 10/24/2018 07:19 AM    Cholesterol, total 167 10/14/2017 08:05 AM    HDL Cholesterol 100 10/02/2021 07:36 AM    HDL Cholesterol 81 10/10/2020 07:39 AM    HDL Cholesterol 74 12/14/2019 07:43 AM    HDL Cholesterol 75 10/24/2018 07:19 AM    HDL Cholesterol 73 10/14/2017 08:05 AM    LDL, calculated 83 10/02/2021 07:36 AM    LDL, calculated 78 10/10/2020 07:39 AM    LDL, calculated 89 12/14/2019 07:43 AM    LDL, calculated 91 10/24/2018 07:19 AM    LDL, calculated 82 10/14/2017 08:05 AM    LDL, calculated 73 10/18/2016 07:23 AM    LDL, calculated 85 02/20/2016 08:47 AM    Triglyceride 65 10/02/2021 07:36 AM    Triglyceride 63 10/10/2020 07:39 AM    Triglyceride 59 12/14/2019 07:43 AM    Triglyceride 73 10/24/2018 07:19 AM    Triglyceride 59 10/14/2017 08:05 AM    CHOL/HDL Ratio 2.3 06/10/2010 08:33 AM    CHOL/HDL Ratio 3.3 11/21/2009 06:52 AM    CHOL/HDL Ratio 2.6 03/14/2009 07:05 AM     ASSESSMENT :      She did have a short atrial run on her Holter monitor but as a mention no symptoms and I am wondering if she could be having some episodes of A. fib given all her risk factors for A. fib so working to provide her with a 30-day event monitor. She is already drinking plenty of water so I do not think she is dehydrated and I encouraged them to get a arm blood pressure cuff to see what her blood pressure really is doing when she is feeling strange. I do not think she needs an echo or stress testing at this point but we will see what the monitor shows  current treatment plan is effective, no change in therapy  lab results and schedule of future lab studies reviewed with patient  reviewed diet, exercise and weight control    Encounter Diagnoses   Name Primary?  Primary hypertension Yes    Pulmonary emphysema, unspecified emphysema type ([address]     PVC (premature ventricular contraction)     Palpitations      No orders of the defined types were placed in this encounter. Follow-up and Dispositions    · Return in about 6 months (around 9/9/2022). Viv Chávez MD  3/9/2022  Please note that this dictation was completed with [employer], the [employer] voice recognition software. Quite often unanticipated grammatical, syntax, homophones, and other interpretive errors are inadvertently transcribed by the computer software. Please disregard these errors. Please excuse any errors that have escaped final proofreading. Thank you.

## 2022-03-18 PROBLEM — S52.501A CLOSED FRACTURE OF RIGHT DISTAL RADIUS: Status: ACTIVE | Noted: 2021-08-24

## 2022-03-19 PROBLEM — G40.209 CRYPTOGENIC PARTIAL COMPLEX EPILEPSY (HCC): Status: ACTIVE | Noted: 2017-12-14

## 2022-03-19 PROBLEM — G25.0 ESSENTIAL TREMOR: Status: ACTIVE | Noted: 2017-12-14

## 2022-03-19 PROBLEM — J44.9 COPD (CHRONIC OBSTRUCTIVE PULMONARY DISEASE) (HCC): Status: ACTIVE | Noted: 2018-07-20

## 2022-03-28 ENCOUNTER — TELEPHONE (OUTPATIENT)
Dept: CARDIOLOGY CLINIC | Age: 77
End: 2022-03-28

## 2022-03-28 NOTE — TELEPHONE ENCOUNTER
Called pt. She wants to go ahead and return monitor because she was having skins irritation and feels she has enough sxs while wearing it. I said fine to return then. Patient verbalized understanding and denied further questions or concerns.

## 2022-03-28 NOTE — TELEPHONE ENCOUNTER
Pt calling about 30 day heart monitor, had it on about two weeks, but this one is causing her skin irritation again, felt like burning. Called the contact number for people who make the device and said if doctor would like her to wear for another 2 weeks, they have one for sensitive skin and that's the one she would like ordered. Please advise.      Phone: [phone]

## 2022-04-11 ENCOUNTER — TELEPHONE (OUTPATIENT)
Dept: CARDIOLOGY CLINIC | Age: 77
End: 2022-04-11

## 2022-04-12 ENCOUNTER — PATIENT MESSAGE (OUTPATIENT)
Dept: CARDIOLOGY CLINIC | Age: 77
End: 2022-04-12

## 2022-04-12 NOTE — LETTER
4/20/2022 4:27 PM    Ms. Calvin Ferreira [id number]         Dear MsDavid Oden,    Your heart monitor showed just a few skipped beats, no serious elevated heart rate or afib. Looks great! Please let us know if you have any questions.          Sincerely,  MD Linda Lee., RN

## 2022-04-20 NOTE — TELEPHONE ENCOUNTER
Unable to reach pt by phone and she has not reviewed [employer] message. Heart monitor results sent by letter in mail.

## 2022-05-10 ENCOUNTER — PATIENT MESSAGE (OUTPATIENT)
Dept: INTERNAL MEDICINE CLINIC | Age: 77
End: 2022-05-10

## 2022-05-10 DIAGNOSIS — I10 ESSENTIAL HYPERTENSION: ICD-10-CM

## 2022-05-10 RX ORDER — DILTIAZEM HYDROCHLORIDE 120 MG/1
120 CAPSULE, COATED, EXTENDED RELEASE ORAL DAILY
Qty: 90 CAPSULE | Refills: 1 | Status: SHIPPED | OUTPATIENT
Start: 2022-05-10 | End: 2022-09-02

## 2022-07-05 ENCOUNTER — PATIENT MESSAGE (OUTPATIENT)
Dept: INTERNAL MEDICINE CLINIC | Age: 77
End: 2022-07-05

## 2022-07-05 DIAGNOSIS — R42 DIZZINESS: ICD-10-CM

## 2022-07-05 RX ORDER — MECLIZINE HYDROCHLORIDE 25 MG/1
25 TABLET ORAL
Qty: 60 TABLET | Refills: 1 | Status: SHIPPED | OUTPATIENT
Start: 2022-07-05 | End: 2022-10-11

## 2022-07-13 ENCOUNTER — OFFICE VISIT (OUTPATIENT)
Dept: CARDIOLOGY CLINIC | Age: 77
End: 2022-07-13
Payer: MEDICARE

## 2022-07-13 VITALS
HEIGHT: 60 IN | BODY MASS INDEX: 31.02 KG/M2 | OXYGEN SATURATION: 96 % | SYSTOLIC BLOOD PRESSURE: 100 MMHG | DIASTOLIC BLOOD PRESSURE: 70 MMHG | WEIGHT: 158 LBS | RESPIRATION RATE: 16 BRPM | HEART RATE: 85 BPM

## 2022-07-13 DIAGNOSIS — I10 PRIMARY HYPERTENSION: ICD-10-CM

## 2022-07-13 DIAGNOSIS — J43.8 OTHER EMPHYSEMA (HCC): ICD-10-CM

## 2022-07-13 DIAGNOSIS — R00.2 PALPITATIONS: Primary | ICD-10-CM

## 2022-07-13 PROCEDURE — G8752 SYS BP LESS 140: HCPCS | Performed by: SPECIALIST

## 2022-07-13 PROCEDURE — 1123F ACP DISCUSS/DSCN MKR DOCD: CPT | Performed by: SPECIALIST

## 2022-07-13 PROCEDURE — G8536 NO DOC ELDER MAL SCRN: HCPCS | Performed by: SPECIALIST

## 2022-07-13 PROCEDURE — 99213 OFFICE O/P EST LOW 20 MIN: CPT | Performed by: SPECIALIST

## 2022-07-13 PROCEDURE — 1101F PT FALLS ASSESS-DOCD LE1/YR: CPT | Performed by: SPECIALIST

## 2022-07-13 PROCEDURE — G8432 DEP SCR NOT DOC, RNG: HCPCS | Performed by: SPECIALIST

## 2022-07-13 PROCEDURE — G8417 CALC BMI ABV UP PARAM F/U: HCPCS | Performed by: SPECIALIST

## 2022-07-13 PROCEDURE — 1090F PRES/ABSN URINE INCON ASSESS: CPT | Performed by: SPECIALIST

## 2022-07-13 PROCEDURE — G8427 DOCREV CUR MEDS BY ELIG CLIN: HCPCS | Performed by: SPECIALIST

## 2022-07-13 PROCEDURE — 3017F COLORECTAL CA SCREEN DOC REV: CPT | Performed by: SPECIALIST

## 2022-07-13 PROCEDURE — G8399 PT W/DXA RESULTS DOCUMENT: HCPCS | Performed by: SPECIALIST

## 2022-07-13 PROCEDURE — G8754 DIAS BP LESS 90: HCPCS | Performed by: SPECIALIST

## 2022-07-13 RX ORDER — MONTELUKAST SODIUM 10 MG/1
10 TABLET ORAL
COMMUNITY
Start: 2022-06-12

## 2022-07-13 NOTE — PROGRESS NOTES
HISTORY OF PRESENT ILLNESS  Cristiana Patel is a 77 y.o. female     SUMMARY:   Problem List  Date Reviewed: 7/13/2022          Codes Class Noted    Closed fracture of right distal radius ICD-10-CM: S52.501A  ICD-9-CM: 813.42  8/24/2021        COPD (chronic obstructive pulmonary disease) ([facility] ICD-10-CM: J44.9  ICD-9-CM: 416  7/20/2018        Cryptogenic partial complex epilepsy ([facility] ICD-10-CM: G40.209  ICD-9-CM: 345.50  12/14/2017        Essential tremor ICD-10-CM: G25.0  ICD-9-CM: 333.1  12/14/2017        Cyst of brain ICD-10-CM: G93.0  ICD-9-CM: 348.0  12/14/2016        HTN (hypertension) ICD-10-CM: I10  ICD-9-CM: 401.9  11/20/2014        Tachyarrhythmia ICD-10-CM: R00.0  ICD-9-CM: 785.0  11/20/2014        Mitral valve prolapse ICD-10-CM: I34.1  ICD-9-CM: 424.0  11/20/2014        Hyperlipidemia ICD-10-CM: E78.5  ICD-9-CM: 272.4  11/20/2014        Obesity (BMI 30.0-34.9) ICD-10-CM: E66.9  ICD-9-CM: 278.00  11/20/2014              Current Outpatient Medications on File Prior to Visit   Medication Sig    montelukast (SINGULAIR) 10 mg tablet Take 10 mg by mouth nightly.  meclizine (ANTIVERT) 25 mg tablet Take 1 Tablet by mouth three (3) times daily as needed for Dizziness (as needed).  dilTIAZem ER (CARDIZEM CD) 120 mg capsule Take 1 Capsule by mouth daily.  simvastatin (ZOCOR) 20 mg tablet Take 1 Tablet by mouth nightly.  lamoTRIgine (LaMICtal) 150 mg tablet TAKE 1 TABLET BY MOUTH TWICE A DAY    Daliresp 500 mcg tab tablet Take 500 mcg by mouth daily.  predniSONE (DELTASONE) 10 mg tablet Take 10 mg by mouth daily.  azithromycin (ZITHROMAX) 250 mg tablet Take 250 mg by mouth See Admin Instructions. Monday. Wednesday, Friday    calcium carbonate (CALTREX) 600 mg calcium (1,500 mg) tablet 1,200 mg.    albuterol (PROVENTIL HFA, VENTOLIN HFA, PROAIR HFA) 90 mcg/actuation inhaler INHALE 1 PUFF EVERY FOUR HOURS AS NEEDED FOR WHEEZING OR SHORTNESS OF BREATH.     albuterol-ipratropium (DUO-NEB) 2.5 mg-0.5 mg/3 ml nebu INHALE 1 VIAL VIA NEBULIZER FOUR TIMES DAILY AS NEEDED    cholecalciferol (VITAMIN D3) (2,000 UNITS /50 MCG) cap capsule Take 1 Cap by mouth daily.  fluticasone/umeclidin/vilanter (TRELEGY ELLIPTA IN) Take 1 Puff by inhalation daily.  aspirin delayed-release 81 mg tablet Take 81 mg by mouth daily. No current facility-administered medications on file prior to visit. CARDIOLOGY STUDIES TO DATE:  4/13 negative lexiscan cardiolyte  7/14 normal echo  10/18 lae, sclerotic non stenotic av, otherwise normal echo  7/19 negative lexiscan  8/20 echo fatuma mas, pa pressure 40mm otherwise normal  2/22 holter, freq pvcs, pacs, 1 short atrial run  4/22 negative event monitor    Chief Complaint   Patient presents with    Follow-up     HPI :  Even though she had lots of symptoms while she was wearing her event monitor she really had no abnormalities to speak of.  certainly no A. fib and nothing like what was seen on her earlier Holter. She continues to have shortness of breath and occasional symptomatic low blood pressure in spite of the fact that she swears she drinks plenty of water. She is still smoking  CARDIAC ROS:   negative for chest pain, syncope, orthopnea, paroxysmal nocturnal dyspnea, exertional chest pressure/discomfort, claudication, lower extremity edema    Family History   Problem Relation Age of Onset    Heart Disease Father     Stroke Father     Cancer Paternal Aunt         breast    Cancer Maternal Grandmother     Cancer Paternal Grandmother         colon    Cancer Son         colon    Cancer Paternal Aunt         breast       Past Medical History:   Diagnosis Date    Arrhythmia     Chronic obstructive pulmonary disease ([address]     Hyperlipidemia     Hypertension     Seizures (HCC)     Vertigo        GENERAL ROS:  A comprehensive review of systems was negative except for that written in the HPI.     Visit Vitals  /70   Pulse 85   Resp 16   Ht 5' (1.524 m)   Wt 158 lb (71.7 kg)   SpO2 96%   BMI 30.86 kg/m²       Wt Readings from Last 3 Encounters:   07/13/22 158 lb (71.7 kg)   03/09/22 161 lb 6.4 oz (73.2 kg)   01/26/22 157 lb 9.6 oz (71.5 kg)            BP Readings from Last 3 Encounters:   07/13/22 100/70   03/09/22 130/80   01/26/22 130/88       PHYSICAL EXAM  General appearance: alert, cooperative, no distress, appears stated age  Neurologic: Alert and oriented X 3  Neck: supple, symmetrical, trachea midline, no adenopathy, no carotid bruit and no JVD  Lungs: clear to auscultation bilaterally  Heart: regular rate and rhythm, S1, S2 normal, no murmur, click, rub or gallop  Extremities: extremities normal, atraumatic, no cyanosis or edema    Lab Results   Component Value Date/Time    Cholesterol, total 195 10/02/2021 07:36 AM    Cholesterol, total 171 10/10/2020 07:39 AM    Cholesterol, total 175 12/14/2019 07:43 AM    Cholesterol, total 181 10/24/2018 07:19 AM    Cholesterol, total 167 10/14/2017 08:05 AM    HDL Cholesterol 100 10/02/2021 07:36 AM    HDL Cholesterol 81 10/10/2020 07:39 AM    HDL Cholesterol 74 12/14/2019 07:43 AM    HDL Cholesterol 75 10/24/2018 07:19 AM    HDL Cholesterol 73 10/14/2017 08:05 AM    LDL, calculated 83 10/02/2021 07:36 AM    LDL, calculated 78 10/10/2020 07:39 AM    LDL, calculated 89 12/14/2019 07:43 AM    LDL, calculated 91 10/24/2018 07:19 AM    LDL, calculated 82 10/14/2017 08:05 AM    LDL, calculated 73 10/18/2016 07:23 AM    LDL, calculated 85 02/20/2016 08:47 AM    Triglyceride 65 10/02/2021 07:36 AM    Triglyceride 63 10/10/2020 07:39 AM    Triglyceride 59 12/14/2019 07:43 AM    Triglyceride 73 10/24/2018 07:19 AM    Triglyceride 59 10/14/2017 08:05 AM    CHOL/HDL Ratio 2.3 06/10/2010 08:33 AM    CHOL/HDL Ratio 3.3 11/21/2009 06:52 AM    CHOL/HDL Ratio 2.6 03/14/2009 07:05 AM     ASSESSMENT :      Wound every she is feeling in her chest is highly unlikely to be related to her heart based on her event monitor results so I reassured her in that regard. Could be related to her COPD anxiety or may be she is having some silent GE reflux. At this point she needs no further cardiac testing or follow-up. current treatment plan is effective, no change in therapy  lab results and schedule of future lab studies reviewed with patient  reviewed diet, exercise and weight control  very strongly urged to quit smoking to reduce cardiovascular risk    Encounter Diagnoses   Name Primary?  Palpitations Yes    Primary hypertension     Other emphysema (HCC)      Orders Placed This Encounter    montelukast (SINGULAIR) 10 mg tablet       Follow-up and Dispositions    · Return if symptoms worsen or fail to improve. Hunter Lopes MD  7/13/2022  Please note that this dictation was completed with [employer], the computer voice recognition software. Quite often unanticipated grammatical, syntax, homophones, and other interpretive errors are inadvertently transcribed by the computer software. Please disregard these errors. Please excuse any errors that have escaped final proofreading. Thank you.

## 2022-07-16 DIAGNOSIS — E78.2 MIXED HYPERLIPIDEMIA: ICD-10-CM

## 2022-07-18 RX ORDER — SIMVASTATIN 20 MG/1
20 TABLET, FILM COATED ORAL
Qty: 90 TABLET | Refills: 1 | Status: SHIPPED | OUTPATIENT
Start: 2022-07-18

## 2022-08-14 NOTE — ANESTHESIA PREPROCEDURE EVALUATION
Relevant Problems   RESPIRATORY SYSTEM   (+) COPD (chronic obstructive pulmonary disease) (HCC)      NEUROLOGY   (+) Cryptogenic partial complex epilepsy (HCC)      CARDIOVASCULAR   (+) HTN (hypertension)   (+) Mitral valve prolapse   (+) Tachyarrhythmia       Anesthetic History               Review of Systems / Medical History      Pulmonary    COPD               Neuro/Psych     seizures         Cardiovascular    Hypertension        Dysrhythmias            GI/Hepatic/Renal                Endo/Other             Other Findings   Comments: HTN (hypertension)  Tachyarrhythmia  Mitral valve prolapse  COPD (chronic obstructive pulmonary disease) (HCC)  Hyperlipidemia   Obesity (BMI 30.0-34.9)   Cyst of brain   Cryptogenic partial complex epilepsy (HCC)   Essential tremor   Closed fracture of right distal radius              Physical Exam    Airway  Mallampati: III  TM Distance: 4 - 6 cm  Neck ROM: normal range of motion   Mouth opening: Normal     Cardiovascular    Rhythm: regular  Rate: normal         Dental  No notable dental hx       Pulmonary  Breath sounds clear to auscultation               Abdominal  GI exam deferred       Other Findings            Anesthetic Plan    ASA: 3  Anesthesia type: general          Induction: Intravenous  Anesthetic plan and risks discussed with: Patient and Family
initiation of breastfeeding/breast milk feeding

## 2022-08-15 ENCOUNTER — OFFICE VISIT (OUTPATIENT)
Dept: INTERNAL MEDICINE CLINIC | Age: 77
End: 2022-08-15
Payer: MEDICARE

## 2022-08-15 VITALS
SYSTOLIC BLOOD PRESSURE: 130 MMHG | HEIGHT: 60 IN | OXYGEN SATURATION: 98 % | DIASTOLIC BLOOD PRESSURE: 80 MMHG | WEIGHT: 160.2 LBS | BODY MASS INDEX: 31.45 KG/M2 | HEART RATE: 72 BPM | RESPIRATION RATE: 16 BRPM | TEMPERATURE: 97.1 F

## 2022-08-15 DIAGNOSIS — E78.2 MIXED HYPERLIPIDEMIA: Primary | ICD-10-CM

## 2022-08-15 DIAGNOSIS — I82.402 ACUTE DEEP VEIN THROMBOSIS (DVT) OF LEFT LOWER EXTREMITY, UNSPECIFIED VEIN (HCC): ICD-10-CM

## 2022-08-15 DIAGNOSIS — G40.209 CRYPTOGENIC PARTIAL COMPLEX EPILEPSY (HCC): ICD-10-CM

## 2022-08-15 PROCEDURE — G8427 DOCREV CUR MEDS BY ELIG CLIN: HCPCS | Performed by: INTERNAL MEDICINE

## 2022-08-15 PROCEDURE — 99214 OFFICE O/P EST MOD 30 MIN: CPT | Performed by: INTERNAL MEDICINE

## 2022-08-15 PROCEDURE — G8510 SCR DEP NEG, NO PLAN REQD: HCPCS | Performed by: INTERNAL MEDICINE

## 2022-08-15 PROCEDURE — G8752 SYS BP LESS 140: HCPCS | Performed by: INTERNAL MEDICINE

## 2022-08-15 PROCEDURE — G8754 DIAS BP LESS 90: HCPCS | Performed by: INTERNAL MEDICINE

## 2022-08-15 PROCEDURE — G8536 NO DOC ELDER MAL SCRN: HCPCS | Performed by: INTERNAL MEDICINE

## 2022-08-15 PROCEDURE — G8399 PT W/DXA RESULTS DOCUMENT: HCPCS | Performed by: INTERNAL MEDICINE

## 2022-08-15 PROCEDURE — 1101F PT FALLS ASSESS-DOCD LE1/YR: CPT | Performed by: INTERNAL MEDICINE

## 2022-08-15 PROCEDURE — 1090F PRES/ABSN URINE INCON ASSESS: CPT | Performed by: INTERNAL MEDICINE

## 2022-08-15 PROCEDURE — 3017F COLORECTAL CA SCREEN DOC REV: CPT | Performed by: INTERNAL MEDICINE

## 2022-08-15 PROCEDURE — G8417 CALC BMI ABV UP PARAM F/U: HCPCS | Performed by: INTERNAL MEDICINE

## 2022-08-15 NOTE — PROGRESS NOTES
Follow Up Visit    Cristiana Patel is a 77 y.o. female. she presents for COPD and Hypertension    Pulmonary Review  The patient is being seen for copd. Symptoms occur: less than 2x/week. Current limitations in activity: She has some limitation in exercise tolerance. Coughing when present is described as moderate. Regimen compliance: The patient reports adherence to this regimen. Patient does smoke cigarettes. In terms of quitting, she is currently precontemplative. She has a history of seizures--remains on AED. Last Sunday, returned home from breakfast.  Went to work on laundry, had chest pain, pain at the left head. Lasted 45 minutes. HR up, BP was low (90's/50's). Her daughter today brings a document where she has recorded each of these types of episodes over the previous 2 years. In that time, she has had 10-12 episodes. They all seem to have an calm and the presence of low blood pressure as well as some head sensation. This is either pain or a \"sloshing feeling\" on the left side of her head. Seeing neurologist Dr. Charanjit Marcelino at [facility].  Needs follow up with her seizure activity. She is looking for second opinion. Patient Active Problem List   Diagnosis Code    HTN (hypertension) I10    Tachyarrhythmia R00.0    Mitral valve prolapse I34.1    Hyperlipidemia E78.5    Obesity (BMI 30.0-34. 9) E66.9    Cyst of brain G93.0    Cryptogenic partial complex epilepsy ([facility] [address] G40.209    Essential tremor G25.0    COPD (chronic obstructive pulmonary disease) (HCC) J44.9    Closed fracture of right distal radius S52.501A         Prior to Admission medications    Medication Sig Start Date End Date Taking? Authorizing Provider   simvastatin (ZOCOR) 20 mg tablet TAKE 1 TABLET BY MOUTH NIGHTLY 7/18/22  Yes Norris Marmolejo MD   montelukast (SINGULAIR) 10 mg tablet Take 10 mg by mouth nightly.  6/12/22  Yes Provider, Historical   meclizine (ANTIVERT) 25 mg tablet Take 1 Tablet by mouth three (3) times daily as needed for Dizziness (as needed). 7/5/22  Yes Tomy Paul MD   dilTIAZem ER (CARDIZEM CD) 120 mg capsule Take 1 Capsule by mouth daily. 5/10/22  Yes Tomy Paul MD   lamoTRIgine (LaMICtal) 150 mg tablet TAKE 1 TABLET BY MOUTH TWICE A DAY 12/31/21  Yes Marly Sandoval MD   Daliresp 500 mcg tab tablet Take 500 mcg by mouth daily. 9/14/21  Yes Provider, Historical   predniSONE (DELTASONE) 10 mg tablet Take 10 mg by mouth daily. Yes Provider, Historical   azithromycin (ZITHROMAX) 250 mg tablet Take 250 mg by mouth See Admin Instructions. Monday. Wednesday, Friday   Yes Provider, Historical   calcium carbonate (CALTREX) 600 mg calcium (1,500 mg) tablet 1,200 mg. Yes Provider, Historical   albuterol (PROVENTIL HFA, VENTOLIN HFA, PROAIR HFA) 90 mcg/actuation inhaler INHALE 1 PUFF EVERY FOUR HOURS AS NEEDED FOR WHEEZING OR SHORTNESS OF BREATH. 1/27/21  Yes Tomy Paul MD   albuterol-ipratropium (DUO-NEB) 2.5 mg-0.5 mg/3 ml nebu INHALE 1 VIAL VIA NEBULIZER FOUR TIMES DAILY AS NEEDED 7/4/20  Yes Provider, Historical   cholecalciferol (VITAMIN D3) (2,000 UNITS /50 MCG) cap capsule Take 1 Cap by mouth daily. Yes Provider, Historical   fluticasone/umeclidin/vilanter (TRELEGY ELLIPTA IN) Take 1 Puff by inhalation daily. Yes Provider, Historical   aspirin delayed-release 81 mg tablet Take 81 mg by mouth daily.    Yes Provider, Historical         Health Maintenance   Topic Date Due    COVID-19 Vaccine (1) Never done    Shingrix Vaccine Age 49> (1 of 2) Never done    Pneumococcal 65+ years (2 - PPSV23 or PCV20) 10/22/2019    Colorectal Cancer Screening Combo  01/02/2021    Flu Vaccine (1) 09/01/2022    Lipid Screen  10/02/2022    Medicare Yearly Exam  10/12/2022    Low dose CT lung screening  02/14/2023    Depression Screen  03/09/2023    DTaP/Tdap/Td series (2 - Td or Tdap) 11/17/2024    Hepatitis C Screening  Completed    Bone Densitometry (Dexa) Screening  Completed       Review of Systems   Constitutional: Negative. Respiratory: Negative. Cardiovascular: Negative. Gastrointestinal: Negative. Visit Vitals  /80 (BP 1 Location: Left arm, BP Patient Position: Sitting, BP Cuff Size: Adult)   Pulse 72   Temp 97.1 °F (36.2 °C) (Temporal)   Resp 16   Ht 5' (1.524 m)   Wt 160 lb 3.2 oz (72.7 kg)   SpO2 98%   BMI 31.29 kg/m²       Physical Exam  Constitutional:       Appearance: Normal appearance. She is well-developed. Cardiovascular:      Rate and Rhythm: Normal rate and regular rhythm. Pulmonary:      Effort: Pulmonary effort is normal.      Breath sounds: Normal breath sounds. Neurological:      General: No focal deficit present. Mental Status: She is oriented to person, place, and time. ASSESSMENT/PLAN    Diagnoses and all orders for this visit:    1. Mixed hyperlipidemia -we will check labs to include her liver function.  -     LIPID PANEL; Future  -     METABOLIC PANEL, COMPREHENSIVE; Future    2. Acute deep vein thrombosis (DVT) of left lower extremity, unspecified vein (HCC)-this appears resolved    3. Cryptogenic partial complex epilepsy ([facility] [address] -I question whether her current symptoms may be related to epilepsy activity. She had a EEG in 2015 which documented evidence of seizure activity on the left temporal region with photic stimulation. 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 I questioned with the patient and her daughter whether or not her episodes may represent seizures. They will discuss this with the neurologist at their upcoming visit.

## 2022-08-15 NOTE — PROGRESS NOTES
Chief Complaint   Patient presents with    COPD    Hypertension     Reviewed record in preparation for visit and have obtained necessary documentation. Identified pt with two pt identifiers(name and ). Health Maintenance Due   Topic    COVID-19 Vaccine (1)    Shingrix Vaccine Age 49> (1 of 2)    Pneumococcal 65+ years (2 - PPSV23 or PCV20)    Colorectal Cancer Screening Combo          Chief Complaint   Patient presents with    COPD    Hypertension        Wt Readings from Last 3 Encounters:   08/15/22 160 lb 3.2 oz (72.7 kg)   22 158 lb (71.7 kg)   22 161 lb 6.4 oz (73.2 kg)     Temp Readings from Last 3 Encounters:   08/15/22 97.1 °F (36.2 °C) (Temporal)   22 97.5 °F (36.4 °C) (Temporal)   10/11/21 97.8 °F (36.6 °C) (Temporal)     BP Readings from Last 3 Encounters:   08/15/22 130/80   22 100/70   22 130/80     Pulse Readings from Last 3 Encounters:   08/15/22 72   22 85   22 74           Learning Assessment:  :     Learning Assessment 2/10/2021 2019 2017 2017 2015 2014   PRIMARY LEARNER Patient Patient Patient Patient Patient Patient   HIGHEST LEVEL OF EDUCATION - PRIMARY LEARNER  - - - - - GRADUATED HIGH SCHOOL OR GED   BARRIERS PRIMARY LEARNER - - - - - [address] CAREGIVER - - - - - No   PRIMARY LANGUAGE ENGLISH ENGLISH ENGLISH ENGLISH ENGLISH ENGLISH   LEARNER PREFERENCE PRIMARY LISTENING DEMONSTRATION READING READING READING READING   ANSWERED BY patient self patient patient patient patient   RELATIONSHIP SELF SELF SELF SELF SELF SELF       Depression Screening:  :     3 most recent PHQ Screens 8/15/2022   Little interest or pleasure in doing things Not at all   Feeling down, depressed, irritable, or hopeless Not at all   Total Score PHQ 2 0       Fall Risk Assessment:  :     Fall Risk Assessment, last 12 mths 8/15/2022   Able to walk? Yes   Fall in past 12 months? 0   Do you feel unsteady?  0   Are you worried about falling 0   Is TUG test greater than 12 seconds? -   Is the gait abnormal? -   Number of falls in past 12 months -   Fall with injury? -       Abuse Screening:  :     Abuse Screening Questionnaire 8/15/2022 1/26/2022 9/3/2021 10/22/2018 7/20/2018 10/10/2017   Do you ever feel afraid of your partner? N N N N N N   Are you in a relationship with someone who physically or mentally threatens you? N N N N N N   Is it safe for you to go home? Y Y Y Y Y Y       Coordination of Care Questionnaire:  :     1) Have you been to an emergency room, urgent care clinic since your last visit? no   Hospitalized since your last visit? no             2) Have you seen or consulted any other health care providers outside of [address] [place] [address] since your last visit? no  (Include any pap smears or colon screenings in this section.)    3) Do you have an Advance Directive on file? no    4) Are you interested in receiving information on Advance Directives? NO      Patient is accompanied by self I have received verbal consent from Orlando Simpson to discuss any/all medical information while they are present in the room. Reviewed record  In preparation for visit and have obtained necessary documentation.

## 2022-09-01 DIAGNOSIS — I10 ESSENTIAL HYPERTENSION: ICD-10-CM

## 2022-09-02 RX ORDER — DILTIAZEM HYDROCHLORIDE 120 MG/1
CAPSULE, COATED, EXTENDED RELEASE ORAL
Qty: 90 CAPSULE | Refills: 1 | Status: SHIPPED | OUTPATIENT
Start: 2022-09-02

## 2022-10-10 DIAGNOSIS — R42 DIZZINESS: ICD-10-CM

## 2022-10-11 RX ORDER — MECLIZINE HYDROCHLORIDE 25 MG/1
25 TABLET ORAL
Qty: 60 TABLET | Refills: 1 | Status: SHIPPED | OUTPATIENT
Start: 2022-10-11

## 2022-10-14 ENCOUNTER — OFFICE VISIT (OUTPATIENT)
Dept: INTERNAL MEDICINE CLINIC | Age: 77
End: 2022-10-14
Payer: MEDICARE

## 2022-10-14 VITALS
HEART RATE: 64 BPM | HEIGHT: 60 IN | OXYGEN SATURATION: 96 % | WEIGHT: 155 LBS | DIASTOLIC BLOOD PRESSURE: 83 MMHG | SYSTOLIC BLOOD PRESSURE: 137 MMHG | BODY MASS INDEX: 30.43 KG/M2 | TEMPERATURE: 97.7 F | RESPIRATION RATE: 16 BRPM

## 2022-10-14 DIAGNOSIS — Z87.440 HISTORY OF UTI: ICD-10-CM

## 2022-10-14 DIAGNOSIS — J43.8 OTHER EMPHYSEMA (HCC): ICD-10-CM

## 2022-10-14 DIAGNOSIS — Z00.00 MEDICARE ANNUAL WELLNESS VISIT, SUBSEQUENT: ICD-10-CM

## 2022-10-14 DIAGNOSIS — Z71.89 ADVANCED DIRECTIVES, COUNSELING/DISCUSSION: Primary | ICD-10-CM

## 2022-10-14 PROCEDURE — G0439 PPPS, SUBSEQ VISIT: HCPCS | Performed by: INTERNAL MEDICINE

## 2022-10-14 PROCEDURE — G8752 SYS BP LESS 140: HCPCS | Performed by: INTERNAL MEDICINE

## 2022-10-14 PROCEDURE — G8417 CALC BMI ABV UP PARAM F/U: HCPCS | Performed by: INTERNAL MEDICINE

## 2022-10-14 PROCEDURE — 1101F PT FALLS ASSESS-DOCD LE1/YR: CPT | Performed by: INTERNAL MEDICINE

## 2022-10-14 PROCEDURE — G8399 PT W/DXA RESULTS DOCUMENT: HCPCS | Performed by: INTERNAL MEDICINE

## 2022-10-14 PROCEDURE — 3017F COLORECTAL CA SCREEN DOC REV: CPT | Performed by: INTERNAL MEDICINE

## 2022-10-14 PROCEDURE — G8510 SCR DEP NEG, NO PLAN REQD: HCPCS | Performed by: INTERNAL MEDICINE

## 2022-10-14 PROCEDURE — G8427 DOCREV CUR MEDS BY ELIG CLIN: HCPCS | Performed by: INTERNAL MEDICINE

## 2022-10-14 PROCEDURE — G8754 DIAS BP LESS 90: HCPCS | Performed by: INTERNAL MEDICINE

## 2022-10-14 PROCEDURE — G8536 NO DOC ELDER MAL SCRN: HCPCS | Performed by: INTERNAL MEDICINE

## 2022-10-14 NOTE — PROGRESS NOTES
This is the Subsequent Medicare Annual Wellness Exam, performed 12 months or more after the Initial AWV or the last Subsequent AWV    I have reviewed the patient's medical history in detail and updated the computerized patient record. Will see neurology on 10/26. Dr. Richar Last. She has continued to have spells ongoing. Had an episode of a fall in late September. Planning on additional neurologic evaluations. Assessment/Plan   Education and counseling provided:  Are appropriate based on today's review and evaluation    1. Advanced directives, counseling/discussion  -     FULL CODE  2. Other emphysema ([address]  3. History of UTI  -     URINALYSIS W/ REFLEX CULTURE; Future  4. Medicare annual wellness visit, subsequent       Depression Risk Factor Screening     3 most recent PHQ Screens 10/14/2022   Little interest or pleasure in doing things Not at all   Feeling down, depressed, irritable, or hopeless Not at all   Total Score PHQ 2 0       Alcohol & Drug Abuse Risk Screen    Do you average more than 1 drink per night or more than 7 drinks a week:  No    On any one occasion in the past three months have you have had more than 3 drinks containing alcohol:  No          Functional Ability and Level of Safety    Hearing: Hearing is good. Activities of Daily Living: The home contains: no safety equipment. Patient does total self care      Ambulation: with no difficulty     Fall Risk:  Fall Risk Assessment, last 12 mths 10/14/2022   Able to walk? Yes   Fall in past 12 months? 1   Do you feel unsteady? 1   Are you worried about falling 0   Is TUG test greater than 12 seconds? 0   Is the gait abnormal? 0   Number of falls in past 12 months 1   Fall with injury?  0      Abuse Screen:  Patient is not abused       Cognitive Screening    Has your family/caregiver stated any concerns about your memory: no         Health Maintenance Due     Health Maintenance Due   Topic Date Due    COVID-19 Vaccine (1) Never done Shingrix Vaccine Age 49> (1 of 2) Never done    Pneumococcal 65+ years (2 - PPSV23 or PCV20) 10/22/2019    Colorectal Cancer Screening Combo  2021    Flu Vaccine (1) 2022    Lipid Screen  10/02/2022    Medicare Yearly Exam  10/12/2022       Patient Care Team   Patient Care Team:  Etta Sanches MD as PCP - General (Internal Medicine Physician)  Etta Sanches MD as PCP - [facility]  Annmarie Jones MD (Neurology)  Aury Carballo MD as Physician (Cardiovascular Disease Physician)    History     Patient Active Problem List   Diagnosis Code    HTN (hypertension) I10    Tachyarrhythmia R00.0    Mitral valve prolapse I34.1    Hyperlipidemia E78.5    Obesity (BMI 30.0-34. 9) E66.9    Cyst of brain G93.0    Cryptogenic partial complex epilepsy ([address] G40.209    Essential tremor G25.0    COPD (chronic obstructive pulmonary disease) ([facility]) J44.9    Closed fracture of right distal radius S52.501A    Acute deep vein thrombosis (DVT) of left lower extremity, unspecified vein ([facility]) I82.402     Past Medical History:   Diagnosis Date    Arrhythmia     Chronic obstructive pulmonary disease (HCC)     Hyperlipidemia     Hypertension     Seizures ([address]     Vertigo       Past Surgical History:   Procedure Laterality Date    HX BREAST LUMPECTOMY Left     pt stated over 10 years. in 90's. HX CATARACT REMOVAL      Both eyes    HX  SECTION      HX ORTHOPAEDIC      trigger finger    HX WISDOM TEETH EXTRACTION      HX WRIST FRACTURE TX Right 2021     Current Outpatient Medications   Medication Sig Dispense Refill    meclizine (ANTIVERT) 25 mg tablet TAKE 1 TABLET BY MOUTH THREE (3) TIMES DAILY AS NEEDED FOR DIZZINESS (AS NEEDED). 60 Tablet 1    dilTIAZem ER (CARDIZEM CD) 120 mg capsule TAKE 1 CAPSULE BY MOUTH EVERY DAY 90 Capsule 1    simvastatin (ZOCOR) 20 mg tablet TAKE 1 TABLET BY MOUTH NIGHTLY 90 Tablet 1    montelukast (SINGULAIR) 10 mg tablet Take 10 mg by mouth nightly. lamoTRIgine (LaMICtal) 150 mg tablet TAKE 1 TABLET BY MOUTH TWICE A  Tablet 3    Daliresp 500 mcg tab tablet Take 500 mcg by mouth daily. predniSONE (DELTASONE) 10 mg tablet Take 10 mg by mouth daily. azithromycin (ZITHROMAX) 250 mg tablet Take 250 mg by mouth See Admin Instructions. Monday. Wednesday, Friday      calcium carbonate (CALTREX) 600 mg calcium (1,500 mg) tablet 1,200 mg.      albuterol (PROVENTIL HFA, VENTOLIN HFA, PROAIR HFA) 90 mcg/actuation inhaler INHALE 1 PUFF EVERY FOUR HOURS AS NEEDED FOR WHEEZING OR SHORTNESS OF BREATH. 18 g 1    albuterol-ipratropium (DUO-NEB) 2.5 mg-0.5 mg/3 ml nebu INHALE 1 VIAL VIA NEBULIZER FOUR TIMES DAILY AS NEEDED      cholecalciferol (VITAMIN D3) (2,000 UNITS /50 MCG) cap capsule Take 1 Cap by mouth daily. fluticasone/umeclidin/vilanter (TRELEGY ELLIPTA IN) Take 1 Puff by inhalation daily. aspirin delayed-release 81 mg tablet Take 81 mg by mouth daily.        Allergies   Allergen Reactions    Hydrocodone Nausea and Vomiting    Amoxicillin Hives    Oxycodone Nausea and Vomiting       Family History   Problem Relation Age of Onset    Heart Disease Father     Stroke Father     Cancer Paternal Aunt         breast    Cancer Maternal Grandmother     Cancer Paternal Grandmother         colon    Cancer Son         colon    Cancer Paternal Aunt         breast     Social History     Tobacco Use    Smoking status: Every Day     Packs/day: 0.50     Years: 50.00     Pack years: 25.00     Types: Cigarettes    Smokeless tobacco: Never    Tobacco comments:     sometimes less per pt statement   Substance Use Topics    Alcohol use: No         Nora Bowie MD

## 2022-10-14 NOTE — PATIENT INSTRUCTIONS
Medicare Wellness Visit, Female     The best way to live healthy is to have a lifestyle where you eat a well-balanced diet, exercise regularly, limit alcohol use, and quit all forms of tobacco/nicotine, if applicable. Regular preventive services are another way to keep healthy. Preventive services (vaccines, screening tests, monitoring & exams) can help personalize your care plan, which helps you manage your own care. Screening tests can find health problems at the earliest stages, when they are easiest to treat. [facility] follows the current, evidence-based guidelines published by the [facility] ([facility]) when recommending preventive services for our patients. Because we follow these guidelines, sometimes recommendations change over time as research supports it. (For example, mammograms used to be recommended annually. Even though Medicare will still pay for an annual mammogram, the newer guidelines recommend a mammogram every two years for women of average risk). Of course, you and your doctor may decide to screen more often for some diseases, based on your risk and your co-morbidities (chronic disease you are already diagnosed with). Preventive services for you include:  - Medicare offers their members a free annual wellness visit, which is time for you and your primary care provider to discuss and plan for your preventive service needs. Take advantage of this benefit every year!  -All adults over the age of 72 should receive the recommended pneumonia vaccines. Current USPSTF guidelines recommend a series of two vaccines for the best pneumonia protection.   -All adults should have a flu vaccine yearly and a tetanus vaccine every 10 years.   -All adults age 48 and older should receive the shingles vaccines (series of two vaccines).       -All adults age 38-68 who are overweight should have a diabetes screening test once every three years.   -All adults born between [birth year] and 1964 should be screened once for Hepatitis C.  -Other screening tests and preventive services for persons with diabetes include: an eye exam to screen for diabetic retinopathy, a kidney function test, a foot exam, and stricter control over your cholesterol.   -Cardiovascular screening for adults with routine risk involves an electrocardiogram (ECG) at intervals determined by your doctor.   -Colorectal cancer screenings should be done for adults age 54-65 with no increased risk factors for colorectal cancer. There are a number of acceptable methods of screening for this type of cancer. Each test has its own benefits and drawbacks. Discuss with your doctor what is most appropriate for you during your annual wellness visit. The different tests include: colonoscopy (considered the best screening method), a fecal occult blood test, a fecal DNA test, and sigmoidoscopy.    -A bone mass density test is recommended when a woman turns 65 to screen for osteoporosis. This test is only recommended one time, as a screening. Some providers will use this same test as a disease monitoring tool if you already have osteoporosis. -Breast cancer screenings are recommended every other year for women of normal risk, age 54-69.  -Cervical cancer screenings for women over age 72 are only recommended with certain risk factors.      Here is a list of your current Health Maintenance items (your personalized list of preventive services) with a due date:  Health Maintenance Due   Topic Date Due    COVID-19 Vaccine (1) Never done    Shingles Vaccine (1 of 2) Never done    Pneumococcal Vaccine (2 - PPSV23 or PCV20) 10/22/2019    Colorectal Screening  01/02/2021    Yearly Flu Vaccine (1) 08/01/2022    Cholesterol Test   10/02/2022    Annual Well Visit  10/12/2022

## 2022-10-23 LAB
ALBUMIN SERPL-MCNC: 4.2 G/DL (ref 3.7–4.7)
ALBUMIN/GLOB SERPL: 2.1 {RATIO} (ref 1.2–2.2)
ALP SERPL-CCNC: 74 IU/L (ref 44–121)
ALT SERPL-CCNC: 11 IU/L (ref 0–32)
APPEARANCE UR: CLEAR
AST SERPL-CCNC: 15 IU/L (ref 0–40)
BACTERIA #/AREA URNS HPF: ABNORMAL /[HPF]
BILIRUB SERPL-MCNC: 0.5 MG/DL (ref 0–1.2)
BILIRUB UR QL STRIP: NEGATIVE
BUN SERPL-MCNC: 16 MG/DL (ref 8–27)
BUN/CREAT SERPL: 20 (ref 12–28)
CALCIUM SERPL-MCNC: 9.3 MG/DL (ref 8.7–10.3)
CASTS URNS QL MICRO: ABNORMAL /LPF
CHLORIDE SERPL-SCNC: 106 MMOL/L (ref 96–106)
CHOLEST SERPL-MCNC: 193 MG/DL (ref 100–199)
CO2 SERPL-SCNC: 25 MMOL/L (ref 20–29)
COLOR UR: YELLOW
CREAT SERPL-MCNC: 0.82 MG/DL (ref 0.57–1)
EGFR: 75 ML/MIN/1.73
EPI CELLS #/AREA URNS HPF: ABNORMAL /HPF (ref 0–10)
GLOBULIN SER CALC-MCNC: 2 G/DL (ref 1.5–4.5)
GLUCOSE SERPL-MCNC: 90 MG/DL (ref 70–99)
GLUCOSE UR QL STRIP: NEGATIVE
HDLC SERPL-MCNC: 96 MG/DL
HGB UR QL STRIP: ABNORMAL
KETONES UR QL STRIP: NEGATIVE
LDLC SERPL CALC-MCNC: 82 MG/DL (ref 0–99)
LEUKOCYTE ESTERASE UR QL STRIP: NEGATIVE
MICRO URNS: ABNORMAL
NITRITE UR QL STRIP: NEGATIVE
PH UR STRIP: 6.5 [PH] (ref 5–7.5)
POTASSIUM SERPL-SCNC: 3.8 MMOL/L (ref 3.5–5.2)
PROT SERPL-MCNC: 6.2 G/DL (ref 6–8.5)
PROT UR QL STRIP: NEGATIVE
RBC #/AREA URNS HPF: ABNORMAL /HPF (ref 0–2)
SODIUM SERPL-SCNC: 145 MMOL/L (ref 134–144)
SP GR UR STRIP: 1.01 (ref 1–1.03)
TRIGL SERPL-MCNC: 86 MG/DL (ref 0–149)
URINALYSIS REFLEX, 377202: ABNORMAL
UROBILINOGEN UR STRIP-MCNC: 0.2 MG/DL (ref 0.2–1)
VLDLC SERPL CALC-MCNC: 15 MG/DL (ref 5–40)
WBC #/AREA URNS HPF: ABNORMAL /HPF (ref 0–5)

## 2022-12-08 ENCOUNTER — TELEPHONE (OUTPATIENT)
Dept: INTERNAL MEDICINE CLINIC | Age: 77
End: 2022-12-08

## 2022-12-09 ENCOUNTER — TRANSCRIBE ORDER (OUTPATIENT)
Dept: SCHEDULING | Age: 77
End: 2022-12-09

## 2022-12-09 DIAGNOSIS — G83.12: Primary | ICD-10-CM

## 2022-12-30 DIAGNOSIS — E78.2 MIXED HYPERLIPIDEMIA: ICD-10-CM

## 2023-01-04 ENCOUNTER — HOSPITAL ENCOUNTER (OUTPATIENT)
Dept: MRI IMAGING | Age: 78
Discharge: HOME | End: 2023-01-04
Attending: PSYCHIATRY & NEUROLOGY
Payer: MEDICARE

## 2023-01-04 DIAGNOSIS — G83.12: ICD-10-CM

## 2023-01-04 PROCEDURE — 72148 MRI LUMBAR SPINE W/O DYE: CPT

## 2023-01-06 RX ORDER — SIMVASTATIN 20 MG/1
20 TABLET, FILM COATED ORAL
Qty: 90 TABLET | Refills: 1 | Status: SHIPPED | OUTPATIENT
Start: 2023-01-06

## 2023-01-10 DIAGNOSIS — R42 DIZZINESS: ICD-10-CM

## 2023-01-12 RX ORDER — MECLIZINE HYDROCHLORIDE 25 MG/1
TABLET ORAL
Qty: 60 TABLET | Refills: 1 | Status: SHIPPED | OUTPATIENT
Start: 2023-01-12

## 2023-02-02 ENCOUNTER — TELEPHONE (OUTPATIENT)
Dept: CARDIOLOGY CLINIC | Age: 78
End: 2023-02-02

## 2023-02-02 NOTE — TELEPHONE ENCOUNTER
Her palpitations and heart pounding are not from serious heart issues, we did event monitor on her and it showed nothing significant.  If she is worried and symptomatic with low bp, she can stop cardiazem

## 2023-02-02 NOTE — TELEPHONE ENCOUNTER
Patient's daughter in law is calling because the patient's is feeling fiant,BP dropping and dizziness. Patient's daughter in [place] is concerned.     [phone] Jessica Richardson daughter in law  [phone] Patient     Patient has been schedule for 2/10/23

## 2023-02-02 NOTE — TELEPHONE ENCOUNTER
Called patient. Verified patient's identity with two identifiers. Patient stated she is doing okay this morning. Once in a while she gets a feeling of weakness then dizziness. When she is with her son he worries and has her check BP, she tells him, he then tells his wife who writes them down. Patient reported one /65 and has been lower. Usually it will go up to about 120 after she rests. She also feels her heart pounding when she is just sitting still, which I told her could be anxiety. She states she drinks plenty of water and is taking meclizine tid as needed regularly. I said I would message Dr. Chikis Berry to advise if maybe diltiazem dose could be cut back, but explained it might be helping palpitations in control. I also offered appointment for her to come in this afternoon instead of next week. Patient said she is doing fine now, but said I should call her daughter in law who arranges appointments. Ronni Kelly. LM on  stating I am sending message to Dr. Chikis Berry to advise, but was going to offer moving appointment up to this afternoon. I said I will call patient back when I hear back from Dr. Chikis Berry.

## 2023-02-03 NOTE — TELEPHONE ENCOUNTER
Pt daughter Mariely Bonilla) returned a call to the nurse,please advise      Alex Marie  [phone]

## 2023-02-03 NOTE — TELEPHONE ENCOUNTER
Reyes Nailer. Notified her of Dr. Ingrid Lange message. She will notify pt and they will keep appt. She denied further questions or concerns.

## 2023-02-10 ENCOUNTER — OFFICE VISIT (OUTPATIENT)
Dept: CARDIOLOGY CLINIC | Age: 78
End: 2023-02-10
Payer: MEDICARE

## 2023-02-10 VITALS
DIASTOLIC BLOOD PRESSURE: 86 MMHG | HEIGHT: 60 IN | OXYGEN SATURATION: 98 % | BODY MASS INDEX: 31.41 KG/M2 | HEART RATE: 75 BPM | RESPIRATION RATE: 18 BRPM | WEIGHT: 160 LBS | SYSTOLIC BLOOD PRESSURE: 150 MMHG

## 2023-02-10 DIAGNOSIS — R00.2 PALPITATIONS: Primary | ICD-10-CM

## 2023-02-10 DIAGNOSIS — I49.3 PVC (PREMATURE VENTRICULAR CONTRACTION): ICD-10-CM

## 2023-02-10 DIAGNOSIS — J43.8 OTHER EMPHYSEMA (HCC): ICD-10-CM

## 2023-02-10 DIAGNOSIS — I10 PRIMARY HYPERTENSION: ICD-10-CM

## 2023-02-10 DIAGNOSIS — R42 DIZZY: ICD-10-CM

## 2023-02-10 NOTE — PROGRESS NOTES
HISTORY OF PRESENT ILLNESS  Elvira Shin is a 69 y.o. female     SUMMARY:   Problem List  Date Reviewed: 2/10/2023            Codes Class Noted    Acute deep vein thrombosis (DVT) of left lower extremity, unspecified vein (HCC) ICD-10-CM: I82.402  ICD-9-CM: 453.40  8/15/2022        Closed fracture of right distal radius ICD-10-CM: S52.501A  ICD-9-CM: 813.42  8/24/2021        COPD (chronic obstructive pulmonary disease) ([facility] ICD-10-CM: J44.9  ICD-9-CM: 496  7/20/2018        Cryptogenic partial complex epilepsy ([facility] ICD-10-CM: G40.209  ICD-9-CM: 345.50  12/14/2017        Essential tremor ICD-10-CM: G25.0  ICD-9-CM: 333.1  12/14/2017        Cyst of brain ICD-10-CM: G93.0  ICD-9-CM: 348.0  12/14/2016        HTN (hypertension) ICD-10-CM: I10  ICD-9-CM: 401.9  11/20/2014        Tachyarrhythmia ICD-10-CM: R00.0  ICD-9-CM: 785.0  11/20/2014        Mitral valve prolapse ICD-10-CM: I34.1  ICD-9-CM: 424.0  11/20/2014        Hyperlipidemia ICD-10-CM: E78.5  ICD-9-CM: 272.4  11/20/2014        Obesity (BMI 30.0-34.9) ICD-10-CM: E66.9  ICD-9-CM: 278.00  11/20/2014           Current Outpatient Medications on File Prior to Visit   Medication Sig    meclizine (ANTIVERT) 25 mg tablet TAKE 1 TABLET BY MOUTH 3 TIMES A DAY AS NEEDED FOR DIZZINESS    simvastatin (ZOCOR) 20 mg tablet TAKE 1 TABLET BY MOUTH NIGHTLY    dilTIAZem ER (CARDIZEM CD) 120 mg capsule TAKE 1 CAPSULE BY MOUTH EVERY DAY    montelukast (SINGULAIR) 10 mg tablet Take 10 mg by mouth nightly. lamoTRIgine (LaMICtal) 150 mg tablet TAKE 1 TABLET BY MOUTH TWICE A DAY    Daliresp 500 mcg tab tablet Take 500 mcg by mouth daily. predniSONE (DELTASONE) 10 mg tablet Take 10 mg by mouth daily. azithromycin (ZITHROMAX) 250 mg tablet Take 250 mg by mouth See Admin Instructions. Monday.  Wednesday, Friday    calcium carbonate (CALTREX) 600 mg calcium (1,500 mg) tablet 1,200 mg.    albuterol (PROVENTIL HFA, VENTOLIN HFA, PROAIR HFA) 90 mcg/actuation inhaler INHALE 1 PUFF EVERY FOUR HOURS AS NEEDED FOR WHEEZING OR SHORTNESS OF BREATH.    albuterol-ipratropium (DUO-NEB) 2.5 mg-0.5 mg/3 ml nebu INHALE 1 VIAL VIA NEBULIZER FOUR TIMES DAILY AS NEEDED    cholecalciferol (VITAMIN D3) (2,000 UNITS /50 MCG) cap capsule Take 1 Cap by mouth daily. fluticasone/umeclidin/vilanter (TRELEGY ELLIPTA IN) Take 1 Puff by inhalation daily. aspirin delayed-release 81 mg tablet Take 81 mg by mouth daily. No current facility-administered medications on file prior to visit. CARDIOLOGY STUDIES TO DATE:  4/13 negative lexiscan cardiolyte  7/14 normal echo  10/18 lae, sclerotic non stenotic av, otherwise normal echo  7/19 negative lexiscan  8/20 echo fatuma mas, pa pressure 40mm otherwise normal  2/22 holter, freq pvcs, pacs, 1 short atrial run  4/22 negative event monitor  12/22 negative tilt test [facility]    Chief Complaint   Patient presents with    Follow-up     HPI :  Since we last met she has been back to her neurologist and had a negative tilt test.  In spite of that she continues to have occasional episodes of symptomatic low blood pressure and so she was given a trial of midodrine which she took for a little while did not make a whole lot of difference and she stopped it because she started having more trouble with vertigo. To my knowledge midodrine is not associated with vertigo. She drinks some caffeine not a lot but she also does not drink a lot of water because it makes her go to the bathroom. She has dependent edema but does not like wearing compression stockings. When she had her daughter called about all this last week we told her to stop the Cardizem and so far she has not had any other spells but she still concerned that occasionally she feels palpitations. Somehow the daughter had the idea that she has had both paroxysmal A-fib and mitral valve prolapse but there is absolutely no evidence of either of those diagnoses in her records.   She is still smoking and gets no regular exercise. CARDIAC ROS:   negative for chest pain, syncope, orthopnea, paroxysmal nocturnal dyspnea, exertional chest pressure/discomfort, claudication    Family History   Problem Relation Age of Onset    Heart Disease Father     Stroke Father     Cancer Paternal Aunt         breast    Cancer Maternal Grandmother     Cancer Paternal Grandmother         colon    Cancer Son         colon    Cancer Paternal Aunt         breast       Past Medical History:   Diagnosis Date    Arrhythmia     Chronic obstructive pulmonary disease ([facility]     Hyperlipidemia     Hypertension     Seizures ([facility]     Vertigo        GENERAL ROS:  A comprehensive review of systems was negative except for that written in the HPI.     Visit Vitals  BP (!) 150/86 (BP 1 Location: Left upper arm, BP Patient Position: Sitting, BP Cuff Size: Large adult)   Pulse 75   Resp 18   Ht 5' (1.524 m)   Wt 160 lb (72.6 kg)   SpO2 98%   BMI 31.25 kg/m²       Wt Readings from Last 3 Encounters:   02/10/23 160 lb (72.6 kg)   10/14/22 155 lb (70.3 kg)   08/15/22 160 lb 3.2 oz (72.7 kg)            BP Readings from Last 3 Encounters:   02/10/23 (!) 150/86   10/14/22 137/83   08/15/22 130/80       PHYSICAL EXAM  General appearance: alert, cooperative, no distress, appears stated age  Neurologic: Alert and oriented X 3  Neck: supple, symmetrical, trachea midline, no adenopathy, no carotid bruit, and no JVD  Lungs: clear to auscultation bilaterally  Heart: regular rate and rhythm, S1, S2 normal, no murmur, click, rub or gallop  Extremities: varicose veins noted, edema 1+    Lab Results   Component Value Date/Time    Cholesterol, total 193 10/22/2022 07:34 AM    Cholesterol, total 195 10/02/2021 07:36 AM    Cholesterol, total 171 10/10/2020 07:39 AM    Cholesterol, total 175 12/14/2019 07:43 AM    Cholesterol, total 181 10/24/2018 07:19 AM    HDL Cholesterol 96 10/22/2022 07:34 AM    HDL Cholesterol 100 10/02/2021 07:36 AM    HDL Cholesterol 81 10/10/2020 07:39 AM    HDL Cholesterol 74 12/14/2019 07:43 AM    HDL Cholesterol 75 10/24/2018 07:19 AM    LDL, calculated 82 10/22/2022 07:34 AM    LDL, calculated 83 10/02/2021 07:36 AM    LDL, calculated 78 10/10/2020 07:39 AM    LDL, calculated 89 12/14/2019 07:43 AM    LDL, calculated 91 10/24/2018 07:19 AM    LDL, calculated 82 10/14/2017 08:05 AM    LDL, calculated 73 10/18/2016 07:23 AM    LDL, calculated 85 02/20/2016 08:47 AM    Triglyceride 86 10/22/2022 07:34 AM    Triglyceride 65 10/02/2021 07:36 AM    Triglyceride 63 10/10/2020 07:39 AM    Triglyceride 59 12/14/2019 07:43 AM    Triglyceride 73 10/24/2018 07:19 AM    CHOL/HDL Ratio 2.3 06/10/2010 08:33 AM    CHOL/HDL Ratio 3.3 11/21/2009 06:52 AM    CHOL/HDL Ratio 2.6 03/14/2009 07:05 AM     ASSESSMENT :      I reassured her and her daughter regarding her diagnoses. Even though her tilt table is negative it does sound like she has blood pressure fluctuations for some reason. She needs to liberalize her water intake and wear compression stockings and I told him they could use the midodrine as needed if she has a spell. She is already been given instructions to stop taking it if her blood pressure runs too high. She needs no further cardiac testing at this time. current treatment plan is effective, no change in therapy  lab results and schedule of future lab studies reviewed with patient  reviewed diet, exercise and weight control    Encounter Diagnoses   Name Primary? Palpitations Yes    Other emphysema (HCC)     PVC (premature ventricular contraction)     Primary hypertension     Dizzy      No orders of the defined types were placed in this encounter. Follow-up and Dispositions    Return if symptoms worsen or fail to improve. Amada Gramajo MD  2/10/2023  Please note that this dictation was completed with [employer] the [employer] voice recognition software.   Quite often unanticipated grammatical, syntax, homophones, and other interpretive errors are inadvertently transcribed by the computer software. Please disregard these errors. Please excuse any errors that have escaped final proofreading. Thank you.

## 2023-02-26 DIAGNOSIS — I10 ESSENTIAL HYPERTENSION: ICD-10-CM

## 2023-03-01 ENCOUNTER — OFFICE VISIT (OUTPATIENT)
Dept: ENT CLINIC | Age: 78
End: 2023-03-01
Payer: MEDICARE

## 2023-03-01 VITALS
DIASTOLIC BLOOD PRESSURE: 78 MMHG | BODY MASS INDEX: 31.41 KG/M2 | SYSTOLIC BLOOD PRESSURE: 122 MMHG | RESPIRATION RATE: 17 BRPM | HEIGHT: 60 IN | WEIGHT: 160 LBS | OXYGEN SATURATION: 94 % | HEART RATE: 84 BPM

## 2023-03-01 DIAGNOSIS — H93.13 TINNITUS OF BOTH EARS: ICD-10-CM

## 2023-03-01 DIAGNOSIS — R26.89 BALANCE DISORDER: Primary | ICD-10-CM

## 2023-03-01 DIAGNOSIS — R42 DIZZINESS: ICD-10-CM

## 2023-03-01 DIAGNOSIS — Z86.69 HISTORY OF ABSENCE SEIZURES: ICD-10-CM

## 2023-03-01 PROCEDURE — G8536 NO DOC ELDER MAL SCRN: HCPCS | Performed by: OTOLARYNGOLOGY

## 2023-03-01 PROCEDURE — 3078F DIAST BP <80 MM HG: CPT | Performed by: OTOLARYNGOLOGY

## 2023-03-01 PROCEDURE — 1101F PT FALLS ASSESS-DOCD LE1/YR: CPT | Performed by: OTOLARYNGOLOGY

## 2023-03-01 PROCEDURE — 1090F PRES/ABSN URINE INCON ASSESS: CPT | Performed by: OTOLARYNGOLOGY

## 2023-03-01 PROCEDURE — 99204 OFFICE O/P NEW MOD 45 MIN: CPT | Performed by: OTOLARYNGOLOGY

## 2023-03-01 PROCEDURE — 1123F ACP DISCUSS/DSCN MKR DOCD: CPT | Performed by: OTOLARYNGOLOGY

## 2023-03-01 PROCEDURE — G8417 CALC BMI ABV UP PARAM F/U: HCPCS | Performed by: OTOLARYNGOLOGY

## 2023-03-01 PROCEDURE — G8399 PT W/DXA RESULTS DOCUMENT: HCPCS | Performed by: OTOLARYNGOLOGY

## 2023-03-01 PROCEDURE — 3074F SYST BP LT 130 MM HG: CPT | Performed by: OTOLARYNGOLOGY

## 2023-03-01 PROCEDURE — G8510 SCR DEP NEG, NO PLAN REQD: HCPCS | Performed by: OTOLARYNGOLOGY

## 2023-03-01 PROCEDURE — G8427 DOCREV CUR MEDS BY ELIG CLIN: HCPCS | Performed by: OTOLARYNGOLOGY

## 2023-03-01 RX ORDER — DILTIAZEM HYDROCHLORIDE 120 MG/1
CAPSULE, COATED, EXTENDED RELEASE ORAL
Qty: 90 CAPSULE | Refills: 1 | Status: SHIPPED | OUTPATIENT
Start: 2023-03-01

## 2023-03-01 RX ORDER — MIDODRINE HYDROCHLORIDE 5 MG/1
TABLET ORAL
COMMUNITY
Start: 2023-01-09

## 2023-03-01 NOTE — PROGRESS NOTES
Subjective:    Elvira Shin   69 y.o.   1945     New Patient Visit    History of Present Illness:  68 yo female with symptoms of head fogginess and blacking out feeling of falling down a deep hole. Has been going on for many years, may occur few days in a row but not consistent. Can be with or without movement. No associated otological symptoms, she does have intermittent tinnitus, does have occupational noise exposure. Some associated facial tremors at times. Pt does feel like she is off balance, no obvious direction of veering. Pt has seen Dr Johnny Thakur starting in 2021 - states had hearing loss, VNG, MRI. Has seen neurologist (Dr Renetta Green) as well. All testing was normal.    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   Similar from 2021 to 2018. Review of Systems  Review of Systems   Constitutional:  Negative for chills and fever. HENT:  Positive for hearing loss and tinnitus. Negative for ear pain and nosebleeds. Eyes:  Negative for blurred vision and double vision. Respiratory:  Negative for cough, sputum production and shortness of breath. Cardiovascular:  Negative for chest pain and palpitations. Gastrointestinal:  Negative for heartburn, nausea and vomiting. Musculoskeletal:  Negative for joint pain and neck pain. Skin: Negative. Neurological:  Positive for tremors. Negative for dizziness, speech change, weakness and headaches. Endo/Heme/Allergies:  Negative for environmental allergies. Does not bruise/bleed easily. Psychiatric/Behavioral:  Negative for memory loss. The patient does not have insomnia.         Past Medical History:   Diagnosis Date    Arrhythmia     Chronic obstructive pulmonary disease (HCC)     Hyperlipidemia     Hypertension     Seizures ([address]     Vertigo      Past Surgical History:   Procedure Laterality Date    HX BREAST LUMPECTOMY Left     pt stated over 10 years. in 80's. HX CATARACT REMOVAL      Both eyes    HX  SECTION      HX ORTHOPAEDIC      trigger finger    HX WISDOM TEETH EXTRACTION      HX WRIST FRACTURE TX Right 2021      Family History   Problem Relation Age of Onset    Heart Disease Father     Stroke Father     Cancer Paternal Aunt         breast    Cancer Maternal Grandmother     Cancer Paternal Grandmother         colon    Cancer Son         colon    Cancer Paternal Aunt         breast     Social History     Tobacco Use    Smoking status: Every Day     Packs/day: 0.50     Years: 50.00     Pack years: 25.00     Types: Cigarettes    Smokeless tobacco: Never    Tobacco comments:     sometimes less per pt statement   Substance Use Topics    Alcohol use: No      Prior to Admission medications    Medication Sig Start Date End Date Taking? Authorizing Provider   midodrine (PROAMATINE) 5 mg tablet TAKE 1 TAB BY MOUTH TWICE DAILY FOR 2 WEEKS-THEN 1 TAB 3 TIMES A DAY AFTER 23   Provider, Historical   meclizine (ANTIVERT) 25 mg tablet TAKE 1 TABLET BY MOUTH 3 TIMES A DAY AS NEEDED FOR DIZZINESS 23   Juan Carlos Shaw MD   simvastatin (ZOCOR) 20 mg tablet TAKE 1 TABLET BY MOUTH NIGHTLY 23   Juan Carlos Shaw MD   dilTIAZem ER (CARDIZEM CD) 120 mg capsule TAKE 1 CAPSULE BY MOUTH EVERY DAY 22   Juan Carlos Shaw MD   montelukast (SINGULAIR) 10 mg tablet Take 10 mg by mouth nightly. 22   Provider, Historical   lamoTRIgine (LaMICtal) 150 mg tablet TAKE 1 TABLET BY MOUTH TWICE A DAY 21   Malena Cruz MD   Daliresp 500 mcg tab tablet Take 500 mcg by mouth daily. 21   Provider, Historical   predniSONE (DELTASONE) 10 mg tablet Take 10 mg by mouth daily. Provider, Historical   azithromycin (ZITHROMAX) 250 mg tablet Take 250 mg by mouth See Admin Instructions. Monday. Wednesday, Friday    Provider, Historical   calcium carbonate (CALTREX) 600 mg calcium (1,500 mg) tablet 1,200 mg.     Provider, Historical   albuterol (PROVENTIL HFA, VENTOLIN HFA, PROAIR HFA) 90 mcg/actuation inhaler INHALE 1 PUFF EVERY FOUR HOURS AS NEEDED FOR WHEEZING OR SHORTNESS OF BREATH. 1/27/21   Iza Rodriguez MD   albuterol-ipratropium (DUO-NEB) 2.5 mg-0.5 mg/3 ml nebu INHALE 1 VIAL VIA NEBULIZER FOUR TIMES DAILY AS NEEDED 7/4/20   Provider, Historical   cholecalciferol (VITAMIN D3) (2,000 UNITS /50 MCG) cap capsule Take 1 Cap by mouth daily. Provider, Historical   fluticasone/umeclidin/vilanter (TRELEGY ELLIPTA IN) Take 1 Puff by inhalation daily. Provider, Historical   aspirin delayed-release 81 mg tablet Take 81 mg by mouth daily. Provider, Historical        Allergies   Allergen Reactions    Hydrocodone Nausea and Vomiting    Amoxicillin Hives    Oxycodone Nausea and Vomiting         Objective:     Visit Vitals  /78 (BP 1 Location: Left upper arm, BP Patient Position: Sitting, BP Cuff Size: Adult)   Pulse 84   Resp 17   Ht 5' (1.524 m)   Wt 160 lb (72.6 kg)   SpO2 94%   BMI 31.25 kg/m²        Physical Exam  Vitals reviewed. Constitutional:       General: She is awake. She is not in acute distress. Appearance: Normal appearance. She is well-groomed. She is obese. HENT:      Head: Normocephalic and atraumatic. Jaw: There is normal jaw occlusion. No trismus, tenderness or malocclusion. Salivary Glands: Right salivary gland is not diffusely enlarged or tender. Left salivary gland is not diffusely enlarged or tender. Right Ear: Hearing, tympanic membrane, ear canal and external ear normal.      Left Ear: Hearing, tympanic membrane, ear canal and external ear normal.      Nose: No nasal deformity, septal deviation or mucosal edema. Right Nostril: No epistaxis. Left Nostril: No epistaxis. Right Turbinates: Not enlarged, swollen or pale. Left Turbinates: Not enlarged, swollen or pale. Right Sinus: No maxillary sinus tenderness or frontal sinus tenderness.       Left Sinus: No maxillary sinus tenderness or frontal sinus tenderness. Comments: Nicotine staining nares     Mouth/Throat:      Lips: Pink. No lesions. Mouth: Mucous membranes are moist. No oral lesions. Dentition: Normal dentition. No dental caries. Tongue: No lesions. Palate: No mass and lesions. Pharynx: Oropharynx is clear. Uvula midline. No oropharyngeal exudate or posterior oropharyngeal erythema. Tonsils: No tonsillar exudate. 0 on the right. 0 on the left. Eyes:      General: Vision grossly intact. Extraocular Movements: Extraocular movements intact. Right eye: No nystagmus. Left eye: No nystagmus. Conjunctiva/sclera: Conjunctivae normal.      Pupils: Pupils are equal, round, and reactive to light. Neck:      Thyroid: No thyroid mass, thyromegaly or thyroid tenderness. Trachea: Trachea and phonation normal. No tracheal tenderness or tracheal deviation. Cardiovascular:      Rate and Rhythm: Normal rate and regular rhythm. Pulmonary:      Effort: Pulmonary effort is normal. No respiratory distress. Breath sounds: No stridor. Musculoskeletal:         General: No swelling or tenderness. Normal range of motion. Cervical back: No edema or erythema. Lymphadenopathy:      Cervical: No cervical adenopathy. Skin:     General: Skin is warm and dry. Findings: No lesion or rash. Neurological:      General: No focal deficit present. Mental Status: She is alert and oriented to person, place, and time. Mental status is at baseline. Coordination: Romberg sign positive. Gait: Gait abnormal.      Comments: Negative Hallpike   Psychiatric:         Mood and Affect: Mood normal.         Behavior: Behavior normal. Behavior is cooperative. Assessment/Plan:     Encounter Diagnoses   Name Primary? Balance disorder Yes    Dizziness     History of absence seizures     Tinnitus of both ears      I will request her records from prior ENT.   She has had an extensive work-up including full vestibular work-up, neurological evaluation, MRI. Her symptoms are not typical of any particular vestibular disorder. I be concerned this is more neurological and have asked them to also discuss whether the symptoms could be secondary to long-term use of Lamictal.    I would think that the very least vestibular physical therapy may be beneficial and she will consider restarting this. Orders Placed This Encounter    midodrine (PROAMATINE) 5 mg tablet           Thank you for referring this patient,    Georges Kenny MD, [address] [facility]    [address]  [facility]    [address] [address]  [place], [place] [address] [id number]

## 2023-03-01 NOTE — LETTER
3/1/2023    Patient: Loretta Low   YOB: 1945   Date of Visit: 3/1/2023     Anabel Bertrand MD  [address] [place], [place] [id number]  [address] [facility] [address]    Dear Anabel Bertrand MD,      Thank you for referring Ms. Aurelia Rueda to [facility] [address] for evaluation. My notes for this consultation are attached. If you have questions, please do not hesitate to call me. I look forward to following your patient along with you.       Sincerely,    Hardy Ramires MD

## 2023-03-21 NOTE — TELEPHONE ENCOUNTER
Patient is completed out of medication Diltazem and need short script sent to local pharmacy. Takes 7-10 business days for mail order. not applicable

## 2023-04-04 RX ORDER — MECLIZINE HYDROCHLORIDE 25 MG/1
TABLET ORAL
Qty: 60 TABLET | Refills: 1 | Status: SHIPPED
Start: 2023-04-04

## 2023-04-17 ENCOUNTER — OFFICE VISIT (OUTPATIENT)
Dept: INTERNAL MEDICINE CLINIC | Age: 78
End: 2023-04-17
Payer: MEDICARE

## 2023-04-17 VITALS
DIASTOLIC BLOOD PRESSURE: 80 MMHG | BODY MASS INDEX: 30.67 KG/M2 | SYSTOLIC BLOOD PRESSURE: 140 MMHG | RESPIRATION RATE: 16 BRPM | OXYGEN SATURATION: 98 % | HEART RATE: 74 BPM | WEIGHT: 156.2 LBS | TEMPERATURE: 97.1 F | HEIGHT: 60 IN

## 2023-04-17 DIAGNOSIS — E78.2 MIXED HYPERLIPIDEMIA: ICD-10-CM

## 2023-04-17 DIAGNOSIS — I10 ESSENTIAL HYPERTENSION: Primary | ICD-10-CM

## 2023-04-17 DIAGNOSIS — R42 DIZZINESS: ICD-10-CM

## 2023-04-17 PROCEDURE — G8536 NO DOC ELDER MAL SCRN: HCPCS | Performed by: INTERNAL MEDICINE

## 2023-04-17 PROCEDURE — G8510 SCR DEP NEG, NO PLAN REQD: HCPCS | Performed by: INTERNAL MEDICINE

## 2023-04-17 PROCEDURE — 99214 OFFICE O/P EST MOD 30 MIN: CPT | Performed by: INTERNAL MEDICINE

## 2023-04-17 PROCEDURE — G8417 CALC BMI ABV UP PARAM F/U: HCPCS | Performed by: INTERNAL MEDICINE

## 2023-04-17 PROCEDURE — G8399 PT W/DXA RESULTS DOCUMENT: HCPCS | Performed by: INTERNAL MEDICINE

## 2023-04-17 PROCEDURE — 1101F PT FALLS ASSESS-DOCD LE1/YR: CPT | Performed by: INTERNAL MEDICINE

## 2023-04-17 PROCEDURE — 1090F PRES/ABSN URINE INCON ASSESS: CPT | Performed by: INTERNAL MEDICINE

## 2023-04-17 PROCEDURE — G8427 DOCREV CUR MEDS BY ELIG CLIN: HCPCS | Performed by: INTERNAL MEDICINE

## 2023-04-17 NOTE — PROGRESS NOTES
Follow Up Visit    Zenia York is a 69 y.o. female. she presents for Hypertension    She reports that her pressures have been labile recently. She has been seen by cardiology and had been told to stop her cardizem after she had been evaluated and found not to have evidence of atrial fibrillation. She has also did not have mitral valve prolapse on her recent echo. She stopped the medication and noted that her blood pressure began to increase. She resumed the medication after a week. This was in February. She has since have noted that her pressures have been labile. We discussed the impact that fluid and sodium intake. Patient Active Problem List   Diagnosis Code    HTN (hypertension) I10    Tachyarrhythmia R00.0    Mitral valve prolapse I34.1    Hyperlipidemia E78.5    Obesity (BMI 30.0-34. 9) E66.9    Cyst of brain G93.0    Cryptogenic partial complex epilepsy ([facility] G40.209    Essential tremor G25.0    COPD (chronic obstructive pulmonary disease) ([facility]) J44.9    Closed fracture of right distal radius S52.501A    Acute deep vein thrombosis (DVT) of left lower extremity, unspecified vein ([facility] I82.402         Prior to Admission medications    Medication Sig Start Date End Date Taking? Authorizing Provider   meclizine (ANTIVERT) 25 mg tablet TAKE 1 TABLET BY MOUTH THREE TIMES A DAY AS NEEDED FOR DIZZINESS 4/4/23  Yes Zara Rodriguez MD   dilTIAZem ER (CARDIZEM CD) 120 mg capsule TAKE 1 CAPSULE BY MOUTH EVERY DAY 3/1/23  Yes Zara Rodriguez MD   simvastatin (ZOCOR) 20 mg tablet TAKE 1 TABLET BY MOUTH NIGHTLY 1/6/23  Yes Zara Rodriguez MD   montelukast (SINGULAIR) 10 mg tablet Take 1 Tablet by mouth nightly. 6/12/22  Yes Provider, Historical   lamoTRIgine (LaMICtal) 150 mg tablet TAKE 1 TABLET BY MOUTH TWICE A DAY 12/31/21  Yes Toby Guzmán MD   Daliresp 500 mcg tab tablet Take 1 Tablet by mouth daily.  9/14/21  Yes Provider, Historical   predniSONE (DELTASONE) 10 mg tablet Take 10 mg by mouth daily. Yes Provider, Historical   azithromycin (ZITHROMAX) 250 mg tablet Take 1 Tablet by mouth See Admin Instructions. Monday. Wednesday, Friday   Yes Provider, Historical   calcium carbonate (CALTREX) 600 mg calcium (1,500 mg) tablet 2 Tablets. Yes Provider, Historical   albuterol (PROVENTIL HFA, VENTOLIN HFA, PROAIR HFA) 90 mcg/actuation inhaler INHALE 1 PUFF EVERY FOUR HOURS AS NEEDED FOR WHEEZING OR SHORTNESS OF BREATH. 1/27/21  Yes Darline Chen MD   albuterol-ipratropium (DUO-NEB) 2.5 mg-0.5 mg/3 ml nebu INHALE 1 VIAL VIA NEBULIZER FOUR TIMES DAILY AS NEEDED 7/4/20  Yes Provider, Historical   cholecalciferol (VITAMIN D3) (2,000 UNITS /50 MCG) cap capsule Take 1 Capsule by mouth daily. Yes Provider, Historical   fluticasone/umeclidin/vilanter (TRELEGY ELLIPTA IN) Take 1 Puff by inhalation daily. Yes Provider, Historical   aspirin delayed-release 81 mg tablet Take 1 Tablet by mouth daily.    Yes Provider, Historical   midodrine (PROAMATINE) 5 mg tablet TAKE 1 TAB BY MOUTH TWICE DAILY FOR 2 WEEKS-THEN 1 TAB 3 TIMES A DAY AFTER  Patient not taking: Reported on 4/17/2023 1/9/23   Provider, Historical         Health Maintenance   Topic Date Due    COVID-19 Vaccine (1) Never done    Shingles Vaccine (1 of 2) Never done    Pneumococcal 65+ years (2 - PPSV23 if available, else PCV20) 12/17/2018    Low dose CT lung screening  02/14/2023    Flu Vaccine (Season Ended) 08/01/2023    Medicare Yearly Exam  10/15/2023    Lipid Screen  10/22/2023    Depression Screen  03/01/2024    DTaP/Tdap/Td series (2 - Td or Tdap) 11/17/2024    Hepatitis C Screening  Completed    Bone Densitometry (Dexa) Screening  Completed    Colorectal Cancer Screening Combo  Discontinued    Breast Cancer Screen Mammogram  Discontinued       ROS        Visit Vitals  BP (!) 140/80   Pulse 74   Temp 97.1 °F (36.2 °C) (Temporal)   Resp 16   Ht 5' (1.524 m)   Wt 156 lb 3.2 oz (70.9 kg)   SpO2 98%   BMI 30.51 kg/m²       Physical Exam      ASSESSMENT/PLAN    {There are no diagnoses linked to this encounter.  (Refresh or delete this SmartLink)}

## 2023-04-17 NOTE — PROGRESS NOTES
Chief Complaint   Patient presents with    Hypertension     Reviewed record in preparation for visit and have obtained necessary documentation. Identified pt with two pt identifiers(name and ). Health Maintenance Due   Topic    COVID-19 Vaccine (1)    Shingles Vaccine (1 of 2)    Pneumococcal 65+ years (2 - PPSV23 if available, else PCV20)    Low dose CT lung screening          Chief Complaint   Patient presents with    Hypertension        Wt Readings from Last 3 Encounters:   23 156 lb 3.2 oz (70.9 kg)   23 160 lb (72.6 kg)   02/10/23 160 lb (72.6 kg)     Temp Readings from Last 3 Encounters:   23 97.1 °F (36.2 °C) (Temporal)   10/14/22 97.7 °F (36.5 °C) (Temporal)   08/15/22 97.1 °F (36.2 °C) (Temporal)     BP Readings from Last 3 Encounters:   23 (!) 140/80   23 122/78   02/10/23 (!) 150/86     Pulse Readings from Last 3 Encounters:   23 74   23 84   02/10/23 75           Learning Assessment:  :     Learning Assessment 2/10/2021 2019 2017 2017 2015 2014   PRIMARY LEARNER Patient Patient Patient Patient Patient Patient   HIGHEST LEVEL OF EDUCATION - PRIMARY LEARNER  - - - - - GRADUATED HIGH SCHOOL OR GED   BARRIERS PRIMARY LEARNER - - - - - [address] CAREGIVER - - - - - No   PRIMARY LANGUAGE ENGLISH ENGLISH ENGLISH ENGLISH ENGLISH ENGLISH   LEARNER PREFERENCE PRIMARY LISTENING DEMONSTRATION READING READING READING READING   ANSWERED BY patient self patient patient patient patient   RELATIONSHIP SELF SELF SELF SELF SELF SELF       Depression Screening:  :     3 most recent PHQ Screens 2023   Little interest or pleasure in doing things Not at all   Feeling down, depressed, irritable, or hopeless Not at all   Total Score PHQ 2 0       Fall Risk Assessment:  :     Fall Risk Assessment, last 12 mths 2023   Able to walk? Yes   Fall in past 12 months? 0   Do you feel unsteady?  0   Are you worried about falling 0   Is TUG test greater than 12 seconds? -   Is the gait abnormal? -   Number of falls in past 12 months -   Fall with injury? -       Abuse Screening:  :     Abuse Screening Questionnaire 4/17/2023 8/15/2022 1/26/2022 9/3/2021 10/22/2018 7/20/2018 10/10/2017   Do you ever feel afraid of your partner? N N N N N N N   Are you in a relationship with someone who physically or mentally threatens you? N N N N N N N   Is it safe for you to go home? Y Y Y Y Y Y Y       Coordination of Care Questionnaire:  :     1) Have you been to an emergency room, urgent care clinic since your last visit? no   Hospitalized since your last visit? no             2) Have you seen or consulted any other health care providers outside of [address] [place] [address] since your last visit? no  (Include any pap smears or colon screenings in this section.)    3) Do you have an Advance Directive on file? no    4) Are you interested in receiving information on Advance Directives? NO      Patient is accompanied by self I have received verbal consent from Eavristo Ma to discuss any/all medical information while they are present in the room.

## 2023-05-18 DIAGNOSIS — E78.2 MIXED HYPERLIPIDEMIA: ICD-10-CM

## 2023-05-18 RX ORDER — SIMVASTATIN 20 MG
TABLET ORAL NIGHTLY
Qty: 90 TABLET | Refills: 1 | Status: SHIPPED | OUTPATIENT
Start: 2023-05-18

## 2023-05-24 RX ORDER — LAMOTRIGINE 150 MG/1
TABLET ORAL
Qty: 180 TABLET | Refills: 3 | OUTPATIENT
Start: 2023-05-24

## 2023-05-25 ENCOUNTER — TELEPHONE (OUTPATIENT)
Age: 78
End: 2023-05-25

## 2023-05-25 NOTE — TELEPHONE ENCOUNTER
Reason for call:    Patient's daughter-in-law, Brittany Sandoval called. Patient was in on 4/17/23 and was told that [address] [place] [address] Rd would contact her to set up her getting a home blood pressure monitor. They haven't heard anything yet.     Is this a new problem: Yes    Date of last appointment:  4/17/2023     Can we respond via Zuberancet: No    Best call back number:     Yessy Sanbornton - [phone]

## 2023-06-22 ENCOUNTER — TELEPHONE (OUTPATIENT)
Age: 78
End: 2023-06-22

## 2023-06-22 DIAGNOSIS — E78.2 MIXED HYPERLIPIDEMIA: ICD-10-CM

## 2023-06-22 RX ORDER — DILTIAZEM HYDROCHLORIDE 120 MG/1
120 CAPSULE, EXTENDED RELEASE ORAL DAILY
Qty: 90 CAPSULE | Refills: 0 | Status: SHIPPED | OUTPATIENT
Start: 2023-06-22

## 2023-06-22 RX ORDER — MECLIZINE HYDROCHLORIDE 25 MG/1
TABLET ORAL
Qty: 30 TABLET | Refills: 0 | Status: SHIPPED | OUTPATIENT
Start: 2023-06-22

## 2023-06-22 RX ORDER — SIMVASTATIN 20 MG
20 TABLET ORAL NIGHTLY
Qty: 90 TABLET | Refills: 0 | Status: SHIPPED | OUTPATIENT
Start: 2023-06-22

## 2023-06-22 NOTE — TELEPHONE ENCOUNTER
Medication Refill Request    Earnestine Cassidy is requesting a refill of the following medication(s):   Meclizine 25 mg tablet  Please send refill to:     [facility]/pharmacy [address] [facility] Bijal Trujillo   [address] [facility] [address]  Phone: [phone] Fax: [phone]

## 2023-06-22 NOTE — TELEPHONE ENCOUNTER
Medication Refill Request    Kathy Veliz is requesting a refill of the following medication(s):   simvastatin (ZOCOR) 20 MG tablet    meclizine (ANTIVERT) 25 MG tablet    dilTIAZem (TIAZAC) 120 MG extended release capsule            Please send refill to:   [address] [facility] [address] [place], [address] [facility] 62 [phone] (Pharmacy)      Per Nilson, no meds should be going to [facility]. ALL medications, from now on, will all go to [facility].

## 2023-06-26 ENCOUNTER — OFFICE VISIT (OUTPATIENT)
Age: 78
End: 2023-06-26
Payer: MEDICARE

## 2023-06-26 VITALS
BODY MASS INDEX: 30.39 KG/M2 | OXYGEN SATURATION: 96 % | DIASTOLIC BLOOD PRESSURE: 70 MMHG | HEART RATE: 73 BPM | RESPIRATION RATE: 16 BRPM | WEIGHT: 154.8 LBS | SYSTOLIC BLOOD PRESSURE: 130 MMHG | TEMPERATURE: 97.1 F | HEIGHT: 60 IN

## 2023-06-26 DIAGNOSIS — M62.830 LUMBAR PARASPINAL MUSCLE SPASM: Primary | ICD-10-CM

## 2023-06-26 PROCEDURE — G8428 CUR MEDS NOT DOCUMENT: HCPCS | Performed by: INTERNAL MEDICINE

## 2023-06-26 PROCEDURE — G8417 CALC BMI ABV UP PARAM F/U: HCPCS | Performed by: INTERNAL MEDICINE

## 2023-06-26 PROCEDURE — 3078F DIAST BP <80 MM HG: CPT | Performed by: INTERNAL MEDICINE

## 2023-06-26 PROCEDURE — G8399 PT W/DXA RESULTS DOCUMENT: HCPCS | Performed by: INTERNAL MEDICINE

## 2023-06-26 PROCEDURE — 3074F SYST BP LT 130 MM HG: CPT | Performed by: INTERNAL MEDICINE

## 2023-06-26 PROCEDURE — 99214 OFFICE O/P EST MOD 30 MIN: CPT | Performed by: INTERNAL MEDICINE

## 2023-06-26 PROCEDURE — 1090F PRES/ABSN URINE INCON ASSESS: CPT | Performed by: INTERNAL MEDICINE

## 2023-06-26 PROCEDURE — 4004F PT TOBACCO SCREEN RCVD TLK: CPT | Performed by: INTERNAL MEDICINE

## 2023-06-26 PROCEDURE — 1123F ACP DISCUSS/DSCN MKR DOCD: CPT | Performed by: INTERNAL MEDICINE

## 2023-06-26 RX ORDER — CYCLOBENZAPRINE HCL 5 MG
2.5 TABLET ORAL NIGHTLY PRN
Qty: 30 TABLET | Refills: 0 | Status: SHIPPED | OUTPATIENT
Start: 2023-06-26

## 2023-07-03 NOTE — PROGRESS NOTES
Acute Care Note    Tresa Marmolejo is 69 y.o. female. she presents for evaluation of Back Pain (Left leg weak)      The patient reports having lower back pain which has been after she picked up a heavy item while working. She reports the pain has begun to slightly improved although she does still have some left leg weakness. Discussed that this may be a consequence of muscle irritation surrounding the nerve on that side. She is able to walk without difficulty. Prior to Admission medications    Medication Sig Start Date End Date Taking?  Authorizing Provider   cyclobenzaprine (FLEXERIL) 5 MG tablet Take 0.5 tablets by mouth nightly as needed for Muscle spasms 6/26/23  Yes Mallika Méndez MD   simvastatin (ZOCOR) 20 MG tablet Take 1 tablet by mouth nightly 6/22/23   Mallika Méndez MD   dilTIAZem HAN[facility]) 120 MG extended release capsule Take 1 capsule by mouth daily 6/22/23   Mallika Méndez MD   meclizine (ANTIVERT) 25 MG tablet TAKE 1 TABLET BY MOUTH 3 TIMES A DAY AS NEEDED FOR DIZZINESS 6/22/23   Mallika Méndez MD   albuterol sulfate HFA (PROVENTIL;VENTOLIN;PROAIR) 108 (90 Base) MCG/ACT inhaler INHALE 1 PUFF EVERY FOUR HOURS AS NEEDED FOR WHEEZING OR SHORTNESS OF BREATH. 1/27/21   Ar Automatic Reconciliation   aspirin 81 MG EC tablet Take 81 mg by mouth daily    Ar Automatic Reconciliation   azithromycin (ZITHROMAX) 250 MG tablet Take 250 mg by mouth See Admin Instructions    Ar Automatic Reconciliation   calcium carbonate 1500 (600 Ca) MG TABS tablet 1,200 mg    Ar Automatic Reconciliation   Cholecalciferol 50 MCG (2000 UT) CAPS Take 1 capsule by mouth daily    Ar Automatic Reconciliation   ipratropium-albuterol (DUONEB) 0.5-2.5 (3) MG/3ML SOLN nebulizer solution INHALE 1 VIAL VIA NEBULIZER FOUR TIMES DAILY AS NEEDED 7/4/20   Ar Automatic Reconciliation   lamoTRIgine (LAMICTAL) 150 MG tablet Take 1 tablet by mouth 2 times daily 12/31/21   Ar Automatic Reconciliation   midodrine (PROAMATINE) 5

## 2023-08-17 ENCOUNTER — TELEPHONE (OUTPATIENT)
Age: 78
End: 2023-08-17

## 2023-08-17 NOTE — TELEPHONE ENCOUNTER
----- Message from Amalia Borjas sent at 8/17/2023  1:02 PM EDT -----  Subject: Referral Request    Reason for referral request? Patient needs her labs done prior to 10/16/23   appointment-patient goes to [facility]; please call patient   when orders are sent to them  Provider patient wants to be referred to(if known):     Provider Phone Number(if known):     Additional Information for Provider?   ---------------------------------------------------------------------------  --------------  [address]    [phone]; OK to leave message on voicemail  ---------------------------------------------------------------------------  --------------

## 2023-09-15 ENCOUNTER — HOSPITAL ENCOUNTER (OUTPATIENT)
Facility: HOSPITAL | Age: 78
End: 2023-09-15
Attending: ORTHOPAEDIC SURGERY
Payer: MEDICARE

## 2023-09-15 ENCOUNTER — TELEPHONE (OUTPATIENT)
Age: 78
End: 2023-09-15

## 2023-09-15 DIAGNOSIS — S32.040A COMPRESSION FRACTURE OF L4 LUMBAR VERTEBRA, CLOSED, INITIAL ENCOUNTER (HCC): ICD-10-CM

## 2023-09-15 PROCEDURE — 72148 MRI LUMBAR SPINE W/O DYE: CPT

## 2023-09-15 NOTE — TELEPHONE ENCOUNTER
----- Message from [facility] sent at 9/15/2023  1:56 PM EDT -----  Subject: Message to Provider    QUESTIONS  Information for Provider? Patient needs lab submitted and they are going   to lab [facility] for the labs please send over labs [place] please send   over labs to that lab [facility]  ---------------------------------------------------------------------------  --------------  [address]  [phone]; OK to leave message on voicemail  ---------------------------------------------------------------------------  --------------  SCRIPT ANSWERS  Relationship to Patient? Spouse/Partner  Representative Name? ms ramos  Is the representative on the Communication Release of Information (NEDRA)   form in Epic?  Yes

## 2023-09-19 DIAGNOSIS — R53.83 OTHER FATIGUE: ICD-10-CM

## 2023-09-19 DIAGNOSIS — I10 ESSENTIAL (PRIMARY) HYPERTENSION: ICD-10-CM

## 2023-09-19 DIAGNOSIS — Z87.440 PERSONAL HISTORY OF URINARY (TRACT) INFECTIONS: Primary | ICD-10-CM

## 2023-09-19 DIAGNOSIS — Z87.440 PERSONAL HISTORY OF URINARY (TRACT) INFECTIONS: ICD-10-CM

## 2023-09-19 DIAGNOSIS — E78.2 MIXED HYPERLIPIDEMIA: ICD-10-CM

## 2023-09-22 RX ORDER — MECLIZINE HYDROCHLORIDE 25 MG/1
TABLET ORAL
Qty: 30 TABLET | Refills: 0 | Status: SHIPPED | OUTPATIENT
Start: 2023-09-22

## 2023-09-22 NOTE — TELEPHONE ENCOUNTER
Medication Refill Request    Alley Benedict is requesting a refill of the following medication(s):   meclizine (ANTIVERT) 25 MG tablet  Please send refill to:     [facility]/pharmacy [address] Chris Garcia, [address] [address]  Phone: [phone] Fax: [phone]

## 2023-09-24 RX ORDER — DILTIAZEM HYDROCHLORIDE 120 MG/1
120 CAPSULE, COATED, EXTENDED RELEASE ORAL DAILY
Qty: 90 CAPSULE | Refills: 1 | Status: SHIPPED | OUTPATIENT
Start: 2023-09-24

## 2023-10-10 LAB
ALBUMIN SERPL-MCNC: 3.8 G/DL (ref 3.8–4.8)
ALBUMIN/GLOB SERPL: 1.9 {RATIO} (ref 1.2–2.2)
ALP SERPL-CCNC: 85 IU/L (ref 44–121)
ALT SERPL-CCNC: 15 IU/L (ref 0–32)
APPEARANCE UR: CLEAR
AST SERPL-CCNC: 14 IU/L (ref 0–40)
BACTERIA #/AREA URNS HPF: NORMAL /[HPF]
BASOPHILS # BLD AUTO: 0 X10E3/UL (ref 0–0.2)
BASOPHILS NFR BLD AUTO: 0 %
BILIRUB SERPL-MCNC: 0.3 MG/DL (ref 0–1.2)
BILIRUB UR QL STRIP: NEGATIVE
BUN SERPL-MCNC: 20 MG/DL (ref 8–27)
BUN/CREAT SERPL: 20 (ref 12–28)
CALCIUM SERPL-MCNC: 9.3 MG/DL (ref 8.7–10.3)
CASTS URNS QL MICRO: NORMAL /LPF
CHLORIDE SERPL-SCNC: 105 MMOL/L (ref 96–106)
CHOLEST SERPL-MCNC: 184 MG/DL (ref 100–199)
CO2 SERPL-SCNC: 27 MMOL/L (ref 20–29)
COLOR UR: YELLOW
CREAT SERPL-MCNC: 0.99 MG/DL (ref 0.57–1)
EGFRCR SERPLBLD CKD-EPI 2021: 59 ML/MIN/1.73
EOSINOPHIL # BLD AUTO: 0.2 X10E3/UL (ref 0–0.4)
EOSINOPHIL NFR BLD AUTO: 2 %
EPI CELLS #/AREA URNS HPF: NORMAL /HPF (ref 0–10)
ERYTHROCYTE [DISTWIDTH] IN BLOOD BY AUTOMATED COUNT: 14 % (ref 11.7–15.4)
GLOBULIN SER CALC-MCNC: 2 G/DL (ref 1.5–4.5)
GLUCOSE SERPL-MCNC: 92 MG/DL (ref 70–99)
GLUCOSE UR QL STRIP: NEGATIVE
HCT VFR BLD AUTO: 39.6 % (ref 34–46.6)
HDLC SERPL-MCNC: 89 MG/DL
HGB BLD-MCNC: 13.4 G/DL (ref 11.1–15.9)
HGB UR QL STRIP: ABNORMAL
IMM GRANULOCYTES # BLD AUTO: 0.1 X10E3/UL (ref 0–0.1)
IMM GRANULOCYTES NFR BLD AUTO: 1 %
KETONES UR QL STRIP: NEGATIVE
LDLC SERPL CALC-MCNC: 81 MG/DL (ref 0–99)
LEUKOCYTE ESTERASE UR QL STRIP: NEGATIVE
LYMPHOCYTES # BLD AUTO: 2.2 X10E3/UL (ref 0.7–3.1)
LYMPHOCYTES NFR BLD AUTO: 22 %
MCH RBC QN AUTO: 30.7 PG (ref 26.6–33)
MCHC RBC AUTO-ENTMCNC: 33.8 G/DL (ref 31.5–35.7)
MCV RBC AUTO: 91 FL (ref 79–97)
MICRO URNS: ABNORMAL
MONOCYTES # BLD AUTO: 1.2 X10E3/UL (ref 0.1–0.9)
MONOCYTES NFR BLD AUTO: 12 %
NEUTROPHILS # BLD AUTO: 6.2 X10E3/UL (ref 1.4–7)
NEUTROPHILS NFR BLD AUTO: 63 %
NITRITE UR QL STRIP: NEGATIVE
PH UR STRIP: 6 [PH] (ref 5–7.5)
PLATELET # BLD AUTO: 344 X10E3/UL (ref 150–450)
POTASSIUM SERPL-SCNC: 4 MMOL/L (ref 3.5–5.2)
PROT SERPL-MCNC: 5.8 G/DL (ref 6–8.5)
PROT UR QL STRIP: NEGATIVE
RBC # BLD AUTO: 4.37 X10E6/UL (ref 3.77–5.28)
RBC #/AREA URNS HPF: NORMAL /HPF (ref 0–2)
SODIUM SERPL-SCNC: 143 MMOL/L (ref 134–144)
SP GR UR STRIP: 1.01 (ref 1–1.03)
TRIGL SERPL-MCNC: 75 MG/DL (ref 0–149)
UROBILINOGEN UR STRIP-MCNC: 0.2 MG/DL (ref 0.2–1)
VLDLC SERPL CALC-MCNC: 14 MG/DL (ref 5–40)
WBC # BLD AUTO: 9.8 X10E3/UL (ref 3.4–10.8)
WBC #/AREA URNS HPF: NORMAL /HPF (ref 0–5)

## 2023-10-12 ENCOUNTER — TELEPHONE (OUTPATIENT)
Dept: PHARMACY | Facility: CLINIC | Age: 78
End: 2023-10-12

## 2023-10-12 NOTE — TELEPHONE ENCOUNTER
Estrella العراقي MD, appt with you 10/16/23, please see below   - Would patient benefit from updated DEXA due to recent fracture? L4 compression fx identified in August (possibly occurred in June)  Last DEXA 2016 - osteoporosis (appears patient declined alendronate at that time - Prolia may be more accessible at this time, if patient agreeable/appropriate for treatment?)  May also consider vitamin D level    If appropriate, please order and have your staff notify patient, or review at appt if time permits. Thank you,  China Franklin, PharmD, [address] [place] [address], toll free: [phone], option 1    ==================================================================  POPULATION HEALTH CLINICAL PHARMACY REVIEW: RECENT FRACTURE    Babar Tavarez is a 69 y.o. old female patient who recently had a compression fracture of L4 (8/24/23 ortho visit; possibly related to a fall back in June)    No results found for: \"VITD25\"   Lab Results   Component Value Date    CALCIUM 9.3 10/09/2023     estimated creatinine clearance is 42 mL/min (based on SCr of 0.99 mg/dL). DEXA 2/12/2016:  osteoporosis    FRAX-calculated 10-year fracture probability:   Per WHO calculator: major Osteoporotic = 25% and Hip = 9.7%     Assessment:   - 69 y.o. female with recent fracture and may benefit from updated DEXA to assess current BMD - last DEXA 2016, osteoporosis  Appears alendronate 70mg weekly ordered @March 2016, and per 10/22/18 PCP note: \"does have a history of osteoporosis on DEXA. This was done in 2016. At that time, we discussed bisphosphonate treatment. The patient declined at that time. \"  - AACE recommends to initiate pharmacologic treatment in those with hip or vertebral fracture. As above, appears patient declined bisphosphonate treatment in past.  - If targeting vitamin D  ng/ml: Unable to assess as no level noted.      Considerations:  - Suggest obtaining DEXA

## 2023-10-17 NOTE — TELEPHONE ENCOUNTER
Karlie Smith MD, appt with you 10/25/23, please see below   - Would patient benefit from updated DEXA due to recent fracture? L4 compression fx identified in August (possibly occurred in June)  Last DEXA 2016 - osteoporosis (appears patient declined alendronate at that time - Prolia may be more accessible at this time, if patient agreeable/appropriate for treatment?)  May also consider vitamin D level     If appropriate, please order and have your staff notify patient, or review at appt if time permits. Thank you,  Maryjo Rodriguez, PharmD, [address] [place] [address], toll free: [phone], option 1     ==================================================================    Noted provider's routing response that they will discuss at appt. Appears appt rescheduled to 10/25/23.

## 2023-10-20 RX ORDER — MECLIZINE HYDROCHLORIDE 25 MG/1
TABLET ORAL
Qty: 30 TABLET | Refills: 0 | Status: SHIPPED | OUTPATIENT
Start: 2023-10-20

## 2023-10-25 NOTE — TELEPHONE ENCOUNTER
Noted appears yesterday/today appts canceled; next appt now scheduled for 12/19/23 to establish with new PCP.    =======================================================   For Pharmacy Admin Tracking Only    Program: Twila in place:  No  Gap Closed?: No   Time Spent (min): 15

## 2023-11-27 DIAGNOSIS — E78.2 MIXED HYPERLIPIDEMIA: ICD-10-CM

## 2023-11-27 NOTE — TELEPHONE ENCOUNTER
[facility] patient needs refills of the following medications -- she will be establishing with a doctor closer to [place], where she lives -- appt is on 12/19, but does not know his name -- her daughter made the appt and has the info.     meclizine (ANTIVERT) 25 MG tablet  simvastatin (ZOCOR) 20 MG tablet    [id number] in [place]

## 2023-11-28 RX ORDER — MECLIZINE HYDROCHLORIDE 25 MG/1
TABLET ORAL
Qty: 30 TABLET | Refills: 0 | OUTPATIENT
Start: 2023-11-28

## 2023-11-28 RX ORDER — SIMVASTATIN 20 MG
20 TABLET ORAL NIGHTLY
Qty: 90 TABLET | Refills: 0 | Status: SHIPPED | OUTPATIENT
Start: 2023-11-28

## 2023-11-29 NOTE — TELEPHONE ENCOUNTER
Previous [facility] patient. Chart & labs reviewed. To early for meclizine. Statin refilled. Has f/u with new PCP already set up.     Future Appointments   Date Time Provider [address] [place] [address]   12/19/2023  1:00 PM Molly Palomino MD [facility] BS [facility]

## 2023-12-07 RX ORDER — MECLIZINE HYDROCHLORIDE 25 MG/1
TABLET ORAL
Qty: 14 TABLET | Refills: 0 | Status: SHIPPED | OUTPATIENT
Start: 2023-12-07

## 2023-12-07 RX ORDER — MECLIZINE HYDROCHLORIDE 25 MG/1
TABLET ORAL
Qty: 14 TABLET | Refills: 0 | Status: SHIPPED | OUTPATIENT
Start: 2023-12-07 | End: 2023-12-07 | Stop reason: SDUPTHER

## 2023-12-07 NOTE — TELEPHONE ENCOUNTER
Reason for call:  Spoke with pt.  Pt requested a refill for   meclizine (ANTIVERT) 25 MG tablet   Send to [facility]/pharmacy [address] [facility] , [address]  [phone]       Is this a new problem: Yes    Date of last appointment:  6/26/2023     Can we respond via trip.me: No    Best call back number: Malik Alu   [phone]

## 2023-12-28 NOTE — TELEPHONE ENCOUNTER
Patient scheduled nuclear stress test Monday, she is wanted to know if she should take medication. Advised to contact cardiologist office. Patient verbalized understanding. No

## 2024-01-04 ENCOUNTER — ENROLLMENT (OUTPATIENT)
Dept: PHARMACY | Facility: CLINIC | Age: 79
End: 2024-01-04

## 2024-01-26 ENCOUNTER — TELEPHONE (OUTPATIENT)
Dept: PHARMACY | Facility: CLINIC | Age: 79
End: 2024-01-26

## 2024-01-26 NOTE — TELEPHONE ENCOUNTER
Sandra Flores MD, NEW patient appt with you 1/29/24, please see below   - Would patient benefit from updated DEXA due to recent fracture?  L4 compression fx identified in August (possibly occurred in June)  Last DEXA 2016 - osteoporosis (appears patient declined alendronate at that time - Prolia may be more accessible at this time, if patient agreeable/appropriate for treatment?)  May also consider vitamin D level     If appropriate, please order and have your staff notify patient, or review at appt if time permits.     Thank you,  Megan Franklin, PharmD, [facility]  Population Health Pharmacist  [facility] Clinical Pharmacy  Department, toll free: [phone], option 2      ==================================================================    [facility] CLINICAL PHARMACY REVIEW: RECENT FRACTURE    Shira Ontiveros is a 78 y.o. old White (non-) female patient who recently had a compression fracture of L4 (8/24/23 ortho visit; possibly related to a fall back in June)     No results found for: \"VITD25\"     Lab Results   Component Value Date    CALCIUM 9.3 10/09/2023     estimated creatinine clearance is 42 mL/min (based on SCr of 0.99 mg/dL).    DEXA 2/12/2016:  osteoporosis    FRAX-calculated 10-year fracture probability:   Per WHO calculator: major Osteoporotic = 26% and Hip = 10%    - Other insurer-identified rx measures - patient not currently found in [employer] payor portal    Assessment:   - 78 y.o. female with recent fracture and may benefit from DEXA to assess current BMD - last DEXA 2016, osteoporosis  Appears alendronate 70mg weekly ordered @March 2016, and per 10/22/18 PCP note: \"does have a history of osteoporosis on DEXA.  This was done in 2016.  At that time, we discussed bisphosphonate treatment.  The patient declined at that time.\"  - AACE recommends to initiate pharmacologic treatment in those with hip or vertebral fracture. As above, appears patient declined bisphosphonate treatment in

## 2024-01-29 ENCOUNTER — OFFICE VISIT (OUTPATIENT)
Facility: CLINIC | Age: 79
End: 2024-01-29
Payer: MEDICARE

## 2024-01-29 VITALS
SYSTOLIC BLOOD PRESSURE: 127 MMHG | RESPIRATION RATE: 16 BRPM | WEIGHT: 148 LBS | BODY MASS INDEX: 29.06 KG/M2 | DIASTOLIC BLOOD PRESSURE: 74 MMHG | HEIGHT: 60 IN | OXYGEN SATURATION: 94 % | TEMPERATURE: 98 F | HEART RATE: 71 BPM

## 2024-01-29 DIAGNOSIS — R42 VERTIGO: ICD-10-CM

## 2024-01-29 DIAGNOSIS — J43.8 OTHER EMPHYSEMA (HCC): ICD-10-CM

## 2024-01-29 DIAGNOSIS — G40.209 LOCALIZATION-RELATED (FOCAL) (PARTIAL) SYMPTOMATIC EPILEPSY AND EPILEPTIC SYNDROMES WITH COMPLEX PARTIAL SEIZURES, NOT INTRACTABLE, WITHOUT STATUS EPILEPTICUS (HCC): ICD-10-CM

## 2024-01-29 DIAGNOSIS — Z86.718 HISTORY OF DVT OF LOWER EXTREMITY: ICD-10-CM

## 2024-01-29 DIAGNOSIS — R42 DIZZINESS: ICD-10-CM

## 2024-01-29 DIAGNOSIS — I10 PRIMARY HYPERTENSION: Primary | ICD-10-CM

## 2024-01-29 DIAGNOSIS — E78.2 MIXED HYPERLIPIDEMIA: ICD-10-CM

## 2024-01-29 PROCEDURE — G8399 PT W/DXA RESULTS DOCUMENT: HCPCS | Performed by: STUDENT IN AN ORGANIZED HEALTH CARE EDUCATION/TRAINING PROGRAM

## 2024-01-29 PROCEDURE — 1090F PRES/ABSN URINE INCON ASSESS: CPT | Performed by: STUDENT IN AN ORGANIZED HEALTH CARE EDUCATION/TRAINING PROGRAM

## 2024-01-29 PROCEDURE — 3023F SPIROM DOC REV: CPT | Performed by: STUDENT IN AN ORGANIZED HEALTH CARE EDUCATION/TRAINING PROGRAM

## 2024-01-29 PROCEDURE — G8484 FLU IMMUNIZE NO ADMIN: HCPCS | Performed by: STUDENT IN AN ORGANIZED HEALTH CARE EDUCATION/TRAINING PROGRAM

## 2024-01-29 PROCEDURE — 99214 OFFICE O/P EST MOD 30 MIN: CPT | Performed by: STUDENT IN AN ORGANIZED HEALTH CARE EDUCATION/TRAINING PROGRAM

## 2024-01-29 PROCEDURE — 4004F PT TOBACCO SCREEN RCVD TLK: CPT | Performed by: STUDENT IN AN ORGANIZED HEALTH CARE EDUCATION/TRAINING PROGRAM

## 2024-01-29 PROCEDURE — G8417 CALC BMI ABV UP PARAM F/U: HCPCS | Performed by: STUDENT IN AN ORGANIZED HEALTH CARE EDUCATION/TRAINING PROGRAM

## 2024-01-29 PROCEDURE — 3078F DIAST BP <80 MM HG: CPT | Performed by: STUDENT IN AN ORGANIZED HEALTH CARE EDUCATION/TRAINING PROGRAM

## 2024-01-29 PROCEDURE — 3074F SYST BP LT 130 MM HG: CPT | Performed by: STUDENT IN AN ORGANIZED HEALTH CARE EDUCATION/TRAINING PROGRAM

## 2024-01-29 PROCEDURE — G8427 DOCREV CUR MEDS BY ELIG CLIN: HCPCS | Performed by: STUDENT IN AN ORGANIZED HEALTH CARE EDUCATION/TRAINING PROGRAM

## 2024-01-29 PROCEDURE — 1123F ACP DISCUSS/DSCN MKR DOCD: CPT | Performed by: STUDENT IN AN ORGANIZED HEALTH CARE EDUCATION/TRAINING PROGRAM

## 2024-01-29 RX ORDER — FLUTICASONE FUROATE, UMECLIDINIUM BROMIDE AND VILANTEROL TRIFENATATE 200; 62.5; 25 UG/1; UG/1; UG/1
1 POWDER RESPIRATORY (INHALATION) DAILY
COMMUNITY
Start: 2023-06-19

## 2024-01-29 RX ORDER — MECLIZINE HYDROCHLORIDE 25 MG/1
TABLET ORAL
Qty: 30 TABLET | Refills: 2 | Status: SHIPPED | OUTPATIENT
Start: 2024-01-29

## 2024-01-29 RX ORDER — SIMVASTATIN 20 MG
20 TABLET ORAL NIGHTLY
Qty: 90 TABLET | Refills: 1 | Status: SHIPPED | OUTPATIENT
Start: 2024-01-29

## 2024-01-29 ASSESSMENT — ENCOUNTER SYMPTOMS
COUGH: 0
FACIAL SWELLING: 0
SORE THROAT: 0
DIARRHEA: 0
SHORTNESS OF BREATH: 0
ABDOMINAL PAIN: 0
CONSTIPATION: 0

## 2024-01-29 NOTE — PROGRESS NOTES
Chief Complaint   Patient presents with    Establish Care    Follow-up Chronic Condition         /74 (Site: Left Upper Arm, Position: Sitting)   Pulse 71   Temp 98 °F (36.7 °C) (Temporal)   Resp 16   Ht 1.524 m (5')   Wt 67.1 kg (148 lb)   SpO2 94%   BMI 28.90 kg/m²         1. \"Have you been to the ER, urgent care clinic since your last visit?  Hospitalized since your last visit?\"  06/10/23, 10/18/23 low bp     2. \"Have you seen or consulted any other health care providers outside of the [facility] since your last visit?\"  Dr. Bennett, [facility], Dr. Ritter ([facility])      3. For patients aged 45-75: Has the patient had a colonoscopy / FIT/ Cologuard? NA - based on age      If the patient is female:    4. For patients aged 40-74: Has the patient had a mammogram within the past 2 years? NA - based on age or sex      5. For patients aged 21-65: Has the patient had a pap smear? NA - based on age or sex

## 2024-01-29 NOTE — PROGRESS NOTES
Shira Ontiveros (: 1945) is a 78 y.o. female, New patient patient, here for evaluation of the following chief complaint(s):  Establish Care and Follow-up Chronic Condition       ASSESSMENT/PLAN:  Below is the assessment and plan developed based on review of pertinent history, physical exam, labs, studies, and medications.    1. Primary hypertension  2. Dizziness  3. Other emphysema (HCC)  4. Localization-related (focal) (partial) symptomatic epilepsy and epileptic syndromes with complex partial seizures, not intractable, without status epilepticus (HCC)  5. Mixed hyperlipidemia  -     simvastatin (ZOCOR) 20 MG tablet; Take 1 tablet by mouth nightly, Disp-90 tablet, R-1Normal  6. Vertigo  -     meclizine (ANTIVERT) 25 MG tablet; TAKE 1 TABLET BY MOUTH 3 TIMES A DAY AS NEEDED FOR DIZZINESS, Disp-30 tablet, R-2Normal  7. History of DVT of lower extremity    Previous pcp: Dr. Abernathy     Hypertension: Controlled, continue current medication.  Monitor blood pressures.  This is being adjusted by cardiology.    She has longstanding complaints of dizziness, which is currently being evaluated by cardiology.  She also says she wakes up in the middle of the night wheezing, sweating.  Recommend she discuss with pulmonology in regards to COPD management.  Also was told to keep a watch for episodes of snoring, apneic episodes to see if sleep apnea is a concern.  Denies any complaints of anxiety.    Continue follow up with neurology   Return in about 6 months (around 2024) for follow up.         SUBJECTIVE/OBJECTIVE:  HPI    Shira Ontiveros is a 78-year-old presents to clinic to establish care.  Her daughter in law accompanies her.   She has past medical history of mitral valve regurgitation, COPD, epilepsy.  She states blood pressures have been labile, fluctuating between 110-130/70-90s. Has gone below 90 systolic and above 150 systolic.   She says that that she episodes of  lightheadedness, sweating and feels like she

## 2024-02-05 NOTE — TELEPHONE ENCOUNTER
Sandra Flores MD, NEW patient appt with you 1/29/24, please see below   - Would patient benefit from updated DEXA due to recent fracture?  L4 compression fx identified in August (possibly occurred in June)  Last DEXA 2016 - osteoporosis (appears patient declined alendronate at that time - Prolia may be more accessible at this time, if patient agreeable/appropriate for treatment?)  May also consider vitamin D level     If appropriate, please order and have your staff notify patient, or review at appt if time permits.     Thank you,  Megan Franklin, PharmD, [facility]  Population Health Pharmacist  [facility] Clinical Pharmacy  Department, toll free: [phone], option 2      ==================================================================    Appears encounter open in provider Inbasket. DEXA not ordered with 1/29/24 appt. Mychart previously sent to patient.    For Pharmacy Admin Tracking Only    Program: [employer]  CPA in place:  No  Recommendation Provided To: Provider: 1 via Note to Provider  Intervention Detail: Lab(s) Ordered  Intervention Accepted By: Provider: 0  Gap Closed?: No   Time Spent (min): 15

## 2024-06-20 DIAGNOSIS — E78.2 MIXED HYPERLIPIDEMIA: ICD-10-CM

## 2024-06-21 RX ORDER — SIMVASTATIN 20 MG
20 TABLET ORAL NIGHTLY
Qty: 90 TABLET | Refills: 1 | Status: SHIPPED | OUTPATIENT
Start: 2024-06-21

## 2024-07-31 DIAGNOSIS — R42 VERTIGO: ICD-10-CM

## 2024-08-01 RX ORDER — MECLIZINE HYDROCHLORIDE 25 MG/1
TABLET ORAL
Qty: 30 TABLET | Refills: 2 | Status: SHIPPED | OUTPATIENT
Start: 2024-08-01

## 2024-11-15 ENCOUNTER — TELEPHONE (OUTPATIENT)
Facility: CLINIC | Age: 79
End: 2024-11-15

## 2024-11-15 NOTE — TELEPHONE ENCOUNTER
Attempted to contact patient regarding upcoming Medicare wellness appointment and completion of HRA questionnaire. LVM for patient to please return call at [phone].

## 2025-04-22 ENCOUNTER — TELEPHONE (OUTPATIENT)
Facility: CLINIC | Age: 80
End: 2025-04-22

## 2025-04-22 SDOH — HEALTH STABILITY: PHYSICAL HEALTH: ON AVERAGE, HOW MANY DAYS PER WEEK DO YOU ENGAGE IN MODERATE TO STRENUOUS EXERCISE (LIKE A BRISK WALK)?: 7 DAYS

## 2025-04-22 ASSESSMENT — PATIENT HEALTH QUESTIONNAIRE - PHQ9
SUM OF ALL RESPONSES TO PHQ QUESTIONS 1-9: 0
1. LITTLE INTEREST OR PLEASURE IN DOING THINGS: NOT AT ALL
2. FEELING DOWN, DEPRESSED OR HOPELESS: NOT AT ALL
SUM OF ALL RESPONSES TO PHQ QUESTIONS 1-9: 0

## 2025-04-22 ASSESSMENT — LIFESTYLE VARIABLES
HOW OFTEN DO YOU HAVE SIX OR MORE DRINKS ON ONE OCCASION: 1
HOW OFTEN DO YOU HAVE A DRINK CONTAINING ALCOHOL: NEVER
HOW MANY STANDARD DRINKS CONTAINING ALCOHOL DO YOU HAVE ON A TYPICAL DAY: 0
HOW MANY STANDARD DRINKS CONTAINING ALCOHOL DO YOU HAVE ON A TYPICAL DAY: PATIENT DOES NOT DRINK
HOW OFTEN DO YOU HAVE A DRINK CONTAINING ALCOHOL: 1

## 2025-04-22 NOTE — TELEPHONE ENCOUNTER
Attempted to contact patient regarding upcoming Medicare wellness appointment and completion of HRA questionnaire. LVM for patient to please return call at [phone].

## 2025-04-22 NOTE — TELEPHONE ENCOUNTER
Returned call regarding upcoming Medicare wellness appointment and completion of HRA questionnaire. Questionnaire completed via phone.

## 2025-04-24 ENCOUNTER — OFFICE VISIT (OUTPATIENT)
Facility: CLINIC | Age: 80
End: 2025-04-24
Payer: MEDICARE

## 2025-04-24 VITALS
RESPIRATION RATE: 18 BRPM | HEART RATE: 71 BPM | HEIGHT: 60 IN | WEIGHT: 145 LBS | DIASTOLIC BLOOD PRESSURE: 72 MMHG | BODY MASS INDEX: 28.47 KG/M2 | OXYGEN SATURATION: 95 % | TEMPERATURE: 97.1 F | SYSTOLIC BLOOD PRESSURE: 115 MMHG

## 2025-04-24 DIAGNOSIS — Z13.1 SCREENING FOR DIABETES MELLITUS: ICD-10-CM

## 2025-04-24 DIAGNOSIS — G25.0 ESSENTIAL TREMOR: ICD-10-CM

## 2025-04-24 DIAGNOSIS — Z87.891 PERSONAL HISTORY OF TOBACCO USE: ICD-10-CM

## 2025-04-24 DIAGNOSIS — E78.5 HYPERLIPIDEMIA, UNSPECIFIED HYPERLIPIDEMIA TYPE: ICD-10-CM

## 2025-04-24 DIAGNOSIS — R42 VERTIGO: ICD-10-CM

## 2025-04-24 DIAGNOSIS — R56.9 SEIZURES (HCC): ICD-10-CM

## 2025-04-24 DIAGNOSIS — J43.8 OTHER EMPHYSEMA (HCC): ICD-10-CM

## 2025-04-24 DIAGNOSIS — Z00.00 MEDICARE ANNUAL WELLNESS VISIT, SUBSEQUENT: Primary | ICD-10-CM

## 2025-04-24 DIAGNOSIS — I10 PRIMARY HYPERTENSION: ICD-10-CM

## 2025-04-24 PROCEDURE — 3078F DIAST BP <80 MM HG: CPT | Performed by: STUDENT IN AN ORGANIZED HEALTH CARE EDUCATION/TRAINING PROGRAM

## 2025-04-24 PROCEDURE — G0439 PPPS, SUBSEQ VISIT: HCPCS | Performed by: STUDENT IN AN ORGANIZED HEALTH CARE EDUCATION/TRAINING PROGRAM

## 2025-04-24 PROCEDURE — 1160F RVW MEDS BY RX/DR IN RCRD: CPT | Performed by: STUDENT IN AN ORGANIZED HEALTH CARE EDUCATION/TRAINING PROGRAM

## 2025-04-24 PROCEDURE — 1159F MED LIST DOCD IN RCRD: CPT | Performed by: STUDENT IN AN ORGANIZED HEALTH CARE EDUCATION/TRAINING PROGRAM

## 2025-04-24 PROCEDURE — 1126F AMNT PAIN NOTED NONE PRSNT: CPT | Performed by: STUDENT IN AN ORGANIZED HEALTH CARE EDUCATION/TRAINING PROGRAM

## 2025-04-24 PROCEDURE — 1090F PRES/ABSN URINE INCON ASSESS: CPT | Performed by: STUDENT IN AN ORGANIZED HEALTH CARE EDUCATION/TRAINING PROGRAM

## 2025-04-24 PROCEDURE — G8399 PT W/DXA RESULTS DOCUMENT: HCPCS | Performed by: STUDENT IN AN ORGANIZED HEALTH CARE EDUCATION/TRAINING PROGRAM

## 2025-04-24 PROCEDURE — 1123F ACP DISCUSS/DSCN MKR DOCD: CPT | Performed by: STUDENT IN AN ORGANIZED HEALTH CARE EDUCATION/TRAINING PROGRAM

## 2025-04-24 PROCEDURE — 4004F PT TOBACCO SCREEN RCVD TLK: CPT | Performed by: STUDENT IN AN ORGANIZED HEALTH CARE EDUCATION/TRAINING PROGRAM

## 2025-04-24 PROCEDURE — 99214 OFFICE O/P EST MOD 30 MIN: CPT | Performed by: STUDENT IN AN ORGANIZED HEALTH CARE EDUCATION/TRAINING PROGRAM

## 2025-04-24 PROCEDURE — 3023F SPIROM DOC REV: CPT | Performed by: STUDENT IN AN ORGANIZED HEALTH CARE EDUCATION/TRAINING PROGRAM

## 2025-04-24 PROCEDURE — G8427 DOCREV CUR MEDS BY ELIG CLIN: HCPCS | Performed by: STUDENT IN AN ORGANIZED HEALTH CARE EDUCATION/TRAINING PROGRAM

## 2025-04-24 PROCEDURE — G8419 CALC BMI OUT NRM PARAM NOF/U: HCPCS | Performed by: STUDENT IN AN ORGANIZED HEALTH CARE EDUCATION/TRAINING PROGRAM

## 2025-04-24 PROCEDURE — 3074F SYST BP LT 130 MM HG: CPT | Performed by: STUDENT IN AN ORGANIZED HEALTH CARE EDUCATION/TRAINING PROGRAM

## 2025-04-24 RX ORDER — ROSUVASTATIN CALCIUM 20 MG/1
20 TABLET, COATED ORAL DAILY
COMMUNITY

## 2025-04-24 RX ORDER — EZETIMIBE 10 MG/1
10 TABLET ORAL DAILY
COMMUNITY

## 2025-04-24 RX ORDER — BUDESONIDE 0.5 MG/2ML
1 INHALANT ORAL 2 TIMES DAILY
COMMUNITY
Start: 2025-04-14

## 2025-04-24 RX ORDER — MECLIZINE HYDROCHLORIDE 25 MG/1
TABLET ORAL
Qty: 90 TABLET | Refills: 2 | Status: SHIPPED | OUTPATIENT
Start: 2025-04-24

## 2025-04-24 RX ORDER — PREDNISONE 5 MG/1
5 TABLET ORAL DAILY
COMMUNITY

## 2025-04-24 RX ORDER — LAMOTRIGINE 300 MG/1
1 TABLET, EXTENDED RELEASE ORAL DAILY
COMMUNITY

## 2025-04-24 RX ORDER — DILTIAZEM HYDROCHLORIDE 120 MG/1
120 CAPSULE, COATED, EXTENDED RELEASE ORAL DAILY
COMMUNITY
Start: 2025-03-06

## 2025-04-24 RX ORDER — AZITHROMYCIN 500 MG/1
TABLET, FILM COATED ORAL
COMMUNITY

## 2025-04-24 NOTE — PATIENT INSTRUCTIONS
cholesterol. If you think you may have a problem with alcohol or drug use, talk to your doctor.   Medicines    Take your medicines exactly as prescribed. Call your doctor if you think you are having a problem with your medicine.     If your doctor recommends aspirin, take the amount directed each day. Make sure you take aspirin and not another kind of pain reliever, such as acetaminophen (Tylenol).   When should you call for help?   Call 911 if you have symptoms of a heart attack. These may include:    Chest pain or pressure, or a strange feeling in the chest.     Sweating.     Shortness of breath.     Pain, pressure, or a strange feeling in the back, neck, jaw, or upper belly or in one or both shoulders or arms.     Lightheadedness or sudden weakness.     A fast or irregular heartbeat.   After you call 911, the  may tell you to chew 1 adult-strength or 2 to 4 low-dose aspirin. Wait for an ambulance. Do not try to drive yourself.  Watch closely for changes in your health, and be sure to contact your doctor if you have any problems.  Where can you learn more?  Go to https://www.[employer].net/patientEd and enter F075 to learn more about \"A Healthy Heart: Care Instructions.\"  Current as of: July 31, 2024  Content Version: 14.4  © 8363-5716 [employer].   Care instructions adapted under license by [employer]. If you have questions about a medical condition or this instruction, always ask your healthcare professional. [employer], disclaims any warranty or liability for your use of this information.    Personalized Preventive Plan for Shira Ontiveros - 4/24/2025  Medicare offers a range of preventive health benefits. Some of the tests and screenings are paid in full while other may be subject to a deductible, co-insurance, and/or copay.  Some of these benefits include a comprehensive review of your medical history including lifestyle, illnesses that may run in your family, and various

## 2025-04-24 NOTE — PROGRESS NOTES
Medicare Annual Wellness Visit    Shira Ontiveros is here for Medicare AWV and Follow-up Chronic Condition    Assessment & Plan   Medicare annual wellness visit, subsequent  Primary hypertension  -     CBC; Future  -     Comprehensive Metabolic Panel; Future  Other emphysema (HCC)  Essential tremor  Seizures (HCC)  Hyperlipidemia, unspecified hyperlipidemia type  -     Lipid Panel; Future  Screening for diabetes mellitus  -     Hemoglobin A1C; Future  Vertigo  -     meclizine (ANTIVERT) 25 MG tablet; TAKE 1 TABLET THREE TIMES DAILY AS NEEDED FOR DIZZINESS, Disp-90 tablet, R-2Normal  Personal history of tobacco use     Hypertension controlled continue current medications, she says she was told by cardiology that if her blood pressure systolic is less than 90 hold diltiazem.  She is currently following closely with cardiology, pulmonology.  She also sees a neurologist.  Reviewed notes in chart    Refused immunizations   She had a CT lung last year October 2024 through her pulmonologist  Blood work as above    Return in about 6 months (around 10/24/2025) for follow up.     Subjective   The following acute and/or chronic problems were also addressed today:  She has hypertension, hyperlipidemia, mitral valve regurgitation, COPD, epilepsy.   She has longstanding complaints of dizziness, which is currently being evaluated by cardiology. Had stress test, ECHO, Referred EP specialist, has a loop recorder   She is following with neurology for the seizures, thought it was more circulatory cause rather than vertigo. She has also seen ENT.   She follows with Dr. Alvarado for COPD .     Patient's complete Health Risk Assessment and screening values have been reviewed and are found in Flowsheets. The following problems were reviewed today and where indicated follow up appointments were made and/or referrals ordered.    Positive Risk Factor Screenings with Interventions:    Fall Risk:  Do you feel unsteady or are you worried about

## 2025-04-24 NOTE — PROGRESS NOTES
\"Have you been to the ER, urgent care clinic since your last visit?  Hospitalized since your last visit?\"    YES - When: approximately 08/12/2024 ago.  Where and Why: [facility] ER for BP.    08/18/2024 [facility] ER for bad vertigo    Feb 4, 2025 [facility] ER for Bronchitis    March 2024 had a implant loop recorder put in     “Have you seen or consulted any other health care providers outside of [facility] since your last visit?”    Saw Dr. Kurt Bennett for a follow up     [facility] for a follow up    Saw Dr. Ritter for a follow up    Chief Complaint   Patient presents with    Medicare AW    Follow-up Chronic Condition     BP (!) 144/61 (BP Site: Right Upper Arm, Patient Position: Sitting, BP Cuff Size: Medium Adult)   Pulse 81   Temp 97.1 °F (36.2 °C) (Temporal)   Resp 18   Ht 1.524 m (5')   Wt 65.8 kg (145 lb)   SpO2 95%   BMI 28.32 kg/m²             Click Here for Release of Records Request

## 2025-04-27 LAB
ALBUMIN SERPL-MCNC: 4 G/DL (ref 3.8–4.8)
ALP SERPL-CCNC: 82 IU/L (ref 44–121)
ALT SERPL-CCNC: 14 IU/L (ref 0–32)
AST SERPL-CCNC: 19 IU/L (ref 0–40)
BILIRUB SERPL-MCNC: 0.3 MG/DL (ref 0–1.2)
BUN SERPL-MCNC: 16 MG/DL (ref 8–27)
BUN/CREAT SERPL: 20 (ref 12–28)
CALCIUM SERPL-MCNC: 9.4 MG/DL (ref 8.7–10.3)
CHLORIDE SERPL-SCNC: 103 MMOL/L (ref 96–106)
CHOLEST SERPL-MCNC: 132 MG/DL (ref 100–199)
CO2 SERPL-SCNC: 23 MMOL/L (ref 20–29)
CREAT SERPL-MCNC: 0.79 MG/DL (ref 0.57–1)
EGFRCR SERPLBLD CKD-EPI 2021: 77 ML/MIN/1.73
ERYTHROCYTE [DISTWIDTH] IN BLOOD BY AUTOMATED COUNT: 15.2 % (ref 11.7–15.4)
GLOBULIN SER CALC-MCNC: 1.9 G/DL (ref 1.5–4.5)
GLUCOSE SERPL-MCNC: 96 MG/DL (ref 70–99)
HCT VFR BLD AUTO: 31.1 % (ref 34–46.6)
HDLC SERPL-MCNC: 82 MG/DL
HGB BLD-MCNC: 10 G/DL (ref 11.1–15.9)
LDLC SERPL CALC-MCNC: 38 MG/DL (ref 0–99)
MCH RBC QN AUTO: 28.3 PG (ref 26.6–33)
MCHC RBC AUTO-ENTMCNC: 32.2 G/DL (ref 31.5–35.7)
MCV RBC AUTO: 88 FL (ref 79–97)
PLATELET # BLD AUTO: 318 X10E3/UL (ref 150–450)
POTASSIUM SERPL-SCNC: 3.8 MMOL/L (ref 3.5–5.2)
PROT SERPL-MCNC: 5.9 G/DL (ref 6–8.5)
RBC # BLD AUTO: 3.53 X10E6/UL (ref 3.77–5.28)
SODIUM SERPL-SCNC: 138 MMOL/L (ref 134–144)
TRIGL SERPL-MCNC: 55 MG/DL (ref 0–149)
VLDLC SERPL CALC-MCNC: 12 MG/DL (ref 5–40)
WBC # BLD AUTO: 9.4 X10E3/UL (ref 3.4–10.8)

## 2025-04-28 LAB — HBA1C MFR BLD: 5.8 % (ref 4.8–5.6)

## 2025-05-16 ENCOUNTER — RESULTS FOLLOW-UP (OUTPATIENT)
Facility: CLINIC | Age: 80
End: 2025-05-16

## 2025-05-16 ENCOUNTER — HOSPITAL ENCOUNTER (EMERGENCY)
Facility: HOSPITAL | Age: 80
Discharge: HOME | End: 2025-05-16
Payer: MEDICARE

## 2025-05-16 VITALS
HEIGHT: 60 IN | HEART RATE: 73 BPM | SYSTOLIC BLOOD PRESSURE: 156 MMHG | TEMPERATURE: 97.7 F | OXYGEN SATURATION: 99 % | DIASTOLIC BLOOD PRESSURE: 88 MMHG | WEIGHT: 145 LBS | BODY MASS INDEX: 28.47 KG/M2

## 2025-05-16 DIAGNOSIS — D64.9 ANEMIA, UNSPECIFIED TYPE: ICD-10-CM

## 2025-05-16 DIAGNOSIS — D64.9 ANEMIA, UNSPECIFIED TYPE: Primary | ICD-10-CM

## 2025-05-16 DIAGNOSIS — S61.411A LACERATION OF RIGHT HAND WITHOUT FOREIGN BODY, INITIAL ENCOUNTER: Primary | ICD-10-CM

## 2025-05-16 PROCEDURE — 12002 RPR S/N/AX/GEN/TRNK2.6-7.5CM: CPT

## 2025-05-16 PROCEDURE — 99282 EMERGENCY DEPT VISIT SF MDM: CPT

## 2025-05-16 ASSESSMENT — PAIN SCALES - GENERAL: PAINLEVEL_OUTOF10: 7

## 2025-05-16 NOTE — DISCHARGE INSTRUCTIONS
Thank you for choosing our Emergency Department for your care.  It is our privilege to care for you in your time of need.  In the next several days, you may receive a survey via email or mailed to your home about your experience with our team.  We would greatly appreciate you taking a few minutes to complete the survey, as we use this information to learn what we have done well and what we could be doing better. Thank you for trusting us with your care!    Below you will find a list of your tests from today's visit.   Labs and Radiology Studies  No results found for this or any previous visit (from the past 12 hours).  No results found.  ------------------------------------------------------------------------------------------------------------  The evaluation and treatment you received in the Emergency Department were for an urgent problem. It is important that you follow-up with a doctor, nurse practitioner, or physician assistant to:  (1) confirm your diagnosis,  (2) re-evaluation of changes in your illness and treatment, and (3) for ongoing care. Please take your discharge instructions with you when you go to your follow-up appointment.     If you have any problem arranging a follow-up appointment, contact us!  If your symptoms become worse or you do not improve as expected, please return to us. We are available 24 hours a day.     If a prescription has been provided, please fill it as soon as possible to prevent a delay in treatment. If you have any questions or reservations about taking the medication due to side effects or interactions with other medications, please call your primary care provider or contact us directly.  Again, THANK YOU for choosing us to care for YOU!

## 2025-05-16 NOTE — ED TRIAGE NOTES
Right hand scratched by patients puppy. Previous surgery in that hand, that is why patient is concerned. Shots UTD on puppy.

## 2025-05-16 NOTE — ED PROVIDER NOTES
[facility] EMERGENCY DEPT  EMERGENCY DEPARTMENT HISTORY AND PHYSICAL EXAM      Date of evaluation: 2025  Patient Name: Shira Ontiveros  Birthdate 1945  MRN: [id number]  ED Provider: JEEVAN Bills   Note Started: 9:40 AM EDT 25    HISTORY OF PRESENT ILLNESS     Chief Complaint   Patient presents with    Dog Scratch       History Provided By: Patient, only     HPI: Shira Ontiveros is a 79 y.o. female with past medical history as listed and reviewed below who presents to the ED complaining of a dog scratch on her right hand. She states that her small dog jumped up onto her hand earlier this morning and scratched her hand with its claws. She is right hand dominant and had hand surgery on the same side 3-4 years ago. Denies injuries elsewhere or any bite injuries. She takes aspirin 81mg daily.     Last tetanus vaccination was within 5 years. She denies any history of diabetes mellitus. Their dog is UTD on vaccines.     PAST MEDICAL HISTORY   Past Medical History:  Past Medical History:   Diagnosis Date    Arrhythmia     Chronic obstructive pulmonary disease (HCC)     Hyperlipidemia     Hypertension     Seizures (HCC)     Vertigo        Past Surgical History:  Past Surgical History:   Procedure Laterality Date    BREAST LUMPECTOMY Left     pt stated over 10 years. in 90's.    CATARACT REMOVAL      Both eyes     SECTION      ORTHOPEDIC SURGERY      trigger finger    WISDOM TOOTH EXTRACTION      WRIST FRACTURE SURGERY Right 2021       Family History:  Family History   Problem Relation Age of Onset    Heart Disease Father     Stroke Father     Cancer Paternal Aunt         breast    Cancer Maternal Grandmother     Cancer Paternal Grandmother         colon    Cancer Son         colon    Cancer Paternal Aunt         breast       Social History:  Social History     Tobacco Use    Smoking status: Every Day     Current packs/day: 0.50     Average packs/day: 0.5 packs/day for 61.4 years (30.7 ttl pk-yrs)  visit (from the past 12 hours).    EKG:.Not Applicable     Radiologic Studies:  Radiographic images are visualized and preliminarily interpreted by the ED Provider with the following findings: Not Applicable..     Interpretation per the Radiologist below, if available at the time of this note:  No orders to display        Records Reviewed: Not Applicable    MEDICAL DECISION MAKING and ED COURSE   11:20 AM Differential and Considerations of tests not ordered: 79-year-old female presents with hand laceration. Considerations include closure with sutures versus skin glue versus secondary intention.  On exam, vital signs are stable and she is generally well-appearing. There are 2 superficial lacerations on the dorsal surface of the right hand with mild active bleeding.  Discussed closure with sutures versus skin glue, and using shared decision making the patient preferred closure with skin glue. Tetanus is up-to-date.    The 2 lacerations were copiously flushed, cleansed with Betadine, and easily closed with Dermabond with good approximation. Discussed the importance of monitoring for any signs of infection and following up with her PCP next week to ensure good wound healing. Considered imaging such as x-ray, however not indicated as low suspicion for fracture or dislocation.    Return precautions were discussed as well. Patient verbalized understanding and agreement of this plan.      Vitals:    Vitals:    05/16/25 0926 05/16/25 0931   BP: (!) 156/88    Pulse: 73    Temp: 97.7 °F (36.5 °C)    SpO2: 99%    Weight:  65.8 kg (145 lb)   Height:  1.524 m (5')       ED COURSE       Clinical Management Tools:  Not Applicable    Smoking Cessation: Not Applicable    Patient was given the following medications:  Medications - No data to display    CONSULTS: See ED Course/MDM for further details.  None   PROCEDURES   Procedure Note - Laceration Repair:  10:11 AM EDT   Procedure by JEEVAN Bills   1.5cm and 3cm linear shaped

## 2025-05-21 ENCOUNTER — OFFICE VISIT (OUTPATIENT)
Facility: CLINIC | Age: 80
End: 2025-05-21
Payer: MEDICARE

## 2025-05-21 VITALS
HEART RATE: 64 BPM | OXYGEN SATURATION: 98 % | SYSTOLIC BLOOD PRESSURE: 129 MMHG | DIASTOLIC BLOOD PRESSURE: 56 MMHG | HEIGHT: 60 IN | BODY MASS INDEX: 28.07 KG/M2 | TEMPERATURE: 98.2 F | WEIGHT: 143 LBS

## 2025-05-21 DIAGNOSIS — S61.411D LACERATION WITHOUT FOREIGN BODY OF RIGHT HAND, SUBSEQUENT ENCOUNTER: Primary | ICD-10-CM

## 2025-05-21 PROCEDURE — 3074F SYST BP LT 130 MM HG: CPT | Performed by: NURSE PRACTITIONER

## 2025-05-21 PROCEDURE — 99214 OFFICE O/P EST MOD 30 MIN: CPT | Performed by: NURSE PRACTITIONER

## 2025-05-21 PROCEDURE — 1160F RVW MEDS BY RX/DR IN RCRD: CPT | Performed by: NURSE PRACTITIONER

## 2025-05-21 PROCEDURE — 1159F MED LIST DOCD IN RCRD: CPT | Performed by: NURSE PRACTITIONER

## 2025-05-21 PROCEDURE — G8419 CALC BMI OUT NRM PARAM NOF/U: HCPCS | Performed by: NURSE PRACTITIONER

## 2025-05-21 PROCEDURE — 3078F DIAST BP <80 MM HG: CPT | Performed by: NURSE PRACTITIONER

## 2025-05-21 PROCEDURE — 1123F ACP DISCUSS/DSCN MKR DOCD: CPT | Performed by: NURSE PRACTITIONER

## 2025-05-21 PROCEDURE — 4004F PT TOBACCO SCREEN RCVD TLK: CPT | Performed by: NURSE PRACTITIONER

## 2025-05-21 PROCEDURE — G8427 DOCREV CUR MEDS BY ELIG CLIN: HCPCS | Performed by: NURSE PRACTITIONER

## 2025-05-21 PROCEDURE — G8399 PT W/DXA RESULTS DOCUMENT: HCPCS | Performed by: NURSE PRACTITIONER

## 2025-05-21 PROCEDURE — 1090F PRES/ABSN URINE INCON ASSESS: CPT | Performed by: NURSE PRACTITIONER

## 2025-05-21 RX ORDER — MUPIROCIN 20 MG/G
OINTMENT TOPICAL
Qty: 15 G | Refills: 0 | Status: SHIPPED | OUTPATIENT
Start: 2025-05-21 | End: 2025-05-28

## 2025-05-21 ASSESSMENT — ENCOUNTER SYMPTOMS
SHORTNESS OF BREATH: 0
CHEST TIGHTNESS: 0
SORE THROAT: 0
ABDOMINAL PAIN: 0
COUGH: 0
NAUSEA: 0
COLOR CHANGE: 0
VOMITING: 0
EYE REDNESS: 0
EYE PAIN: 0
WHEEZING: 0
TROUBLE SWALLOWING: 0
EYE DISCHARGE: 0

## 2025-05-21 NOTE — PROGRESS NOTES
Subjective  Chief Complaint   Patient presents with    Other     Dog scratch on top of right hand-     HPI:  Shira Ontiveros is a 79 y.o. female with medical history as reviewed and included.     Patient presents to the clinic for an acute care visit after presenting to the emergency department for evaluation of 2 dog scratch lacerations on dorsal aspect right hand. Tetanus vaccination status up-to-date. Emergency department intervention included saline flush of bilateral lacerations, cleansed with Betadine and closed with Dermabond.    Follow-up appointment today to evaluate rate of healing and status of laceration closure.  Noted bruising over the right second proximal knuckle, Dermabond appears intact, laceration edges well-approximated with no evidence of dehiscence or drainage.  Mild ecchymosis is present in the surrounding tissue, area tender to palpation but without signs of infection, warmth appearance.  Overall appearance consistent with expected healing.  Prescribing topical mupirocin.  May leave site open to air, patient concerned with possible injury while sleep agree for breathable bandage at night, apply mupirocin ointment once daily.     Review of Systems   Constitutional:  Negative for chills, fatigue and fever.   HENT:  Negative for ear pain, hearing loss, sore throat and trouble swallowing.    Eyes:  Negative for pain, discharge and redness.   Respiratory:  Negative for cough, chest tightness, shortness of breath and wheezing.    Gastrointestinal:  Negative for abdominal pain, nausea and vomiting.   Endocrine: Negative for cold intolerance and heat intolerance.   Genitourinary:  Negative for dysuria and flank pain.   Skin:  Positive for wound (Right hand with 2 lacerations closed with Dermabond). Negative for color change and rash.   Neurological:  Negative for dizziness, seizures, weakness, light-headedness and headaches.   Psychiatric/Behavioral:  Negative for agitation and confusion.

## 2025-05-21 NOTE — ASSESSMENT & PLAN NOTE
New, not at goal (unstable), continue current treatment plan and medication adherence emphasized    Orders:    mupirocin (BACTROBAN) 2 % ointment; Apply topically 3 times daily.

## 2025-05-21 NOTE — PROGRESS NOTES
Chief Complaint   Patient presents with    Other     Dog scratch on top of right hand-    BP (!) 129/56 (BP Site: Right Upper Arm, Patient Position: Sitting, BP Cuff Size: Medium Adult)   Pulse 64   Temp 98.2 °F (36.8 °C) (Oral)   Ht 1.524 m (5')   Wt 64.9 kg (143 lb)   SpO2 98%   BMI 27.93 kg/m²    Have you been to the ER, urgent care clinic since your last visit?  Hospitalized since your last visit?   YES - When: approximately 5 days ago.  Where and Why: dog bite.    Have you seen or consulted any other health care providers outside our system since your last visit?   NO

## 2025-05-30 LAB — LEFT VENTRICULAR EJECTION FRACTION, EXTERNAL: NORMAL

## (undated) DEVICE — 3M™ STERI-STRIP™ REINFORCED ADHESIVE SKIN CLOSURES, R1542, 1/4 IN X 1-1/2 IN (6 MM X 38 MM), 6 STRIPS/ENVELOPE: Brand: 3M™ STERI-STRIP™

## (undated) DEVICE — STAPLER SKIN H3.9MM WIRE DIA0.58MM CRWN 6.9MM 35 STPL FIX

## (undated) DEVICE — SUT MCRYL + 3-0 27IN PS1 UD --

## (undated) DEVICE — GARMENT CMPR STD UNV CALF FLWT -- RN VENTILATE NS DISP 17- IN

## (undated) DEVICE — 3M™ COBAN™ NL STERILE NON-LATEX SELF-ADHERENT WRAP, 2084S, 4 IN X 5 YD (10 CM X 4,5 M), 18 ROLLS/CASE: Brand: 3M™ COBAN™

## (undated) DEVICE — GLOVE SURG SZ 75 L12IN FNGR THK79MIL GRN LTX FREE

## (undated) DEVICE — SOUTHSIDE TURNOVER: Brand: MEDLINE INDUSTRIES, INC.

## (undated) DEVICE — TUBING, SUCTION, 1/4" X 12', STRAIGHT: Brand: MEDLINE

## (undated) DEVICE — PADDING CST 4INX4YD --

## (undated) DEVICE — Z DISCONTINUED NO SUB IDED BUCKET PLSTR 5QT PAPR WAX CVR DBL WRP DISP

## (undated) DEVICE — MINOR EXTREMITY PACK: Brand: MEDLINE INDUSTRIES, INC.

## (undated) DEVICE — GARMENT,MEDLINE,DVT,INT,CALF,MED, GEN2: Brand: MEDLINE

## (undated) DEVICE — REM POLYHESIVE ADULT PATIENT RETURN ELECTRODE: Brand: VALLEYLAB

## (undated) DEVICE — GLOVE ORTHO 7 1/2   MSG9475

## (undated) DEVICE — DRSG GZ OIL EMUL CURAD 3X3 --

## (undated) DEVICE — ADHESIVE LIQ H2O INSOLUBLE 3 CC LO RISK N STN MASTISOL

## (undated) DEVICE — GLOVE SURG SZ 8 L12IN FNGR THK79MIL GRN LTX FREE

## (undated) DEVICE — ROCKER SWITCH PENCIL BLADE ELECTRODE, HOLSTER: Brand: EDGE

## (undated) DEVICE — TAPE CST XF SET SPEC 4INX5YD --

## (undated) DEVICE — PREP SKN CHLRAPRP APL 26ML STR --

## (undated) DEVICE — GAUZE,SPONGE,4"X4",16PLY,STRL,LF,10/TRAY: Brand: MEDLINE

## (undated) DEVICE — BANDAGE COBAN 4 IN COMPR W4INXL5YD FOAM COHESIVE QUIK STK SELF ADH SFT

## (undated) DEVICE — SLING ARM ENV PD DLX LG BLU --

## (undated) DEVICE — PREP PAD BNS: Brand: CONVERTORS

## (undated) DEVICE — PADDING CST 4IN STERILE --

## (undated) DEVICE — GLOVE ORANGE PI 7 1/2   MSG9075

## (undated) DEVICE — SPONGE GZ W4XL4IN COT 12 PLY TYP VII WVN C FLD DSGN

## (undated) DEVICE — SCR BNE NLCK LP 2.7X14MM --
Type: IMPLANTABLE DEVICE | Site: HAND | Status: NON-FUNCTIONAL
Removed: 2021-08-24

## (undated) DEVICE — COVER LT HNDL BLU PLAS

## (undated) DEVICE — DRAPE,HAND,STERILE: Brand: MEDLINE

## (undated) DEVICE — BASIC SINGLE BASIN-LF: Brand: MEDLINE INDUSTRIES, INC.

## (undated) DEVICE — ADHESIVE LIQ 2OZ ADJUNCT FOR DSG MASTISOL

## (undated) DEVICE — SUTURE VCRL RAPIDE SZ 4-0 L27IN ABSRB UD PS-2 L19MM 3/8 CIR VR426

## (undated) DEVICE — BIT DRL QC 2.2MM NS DISP --

## (undated) DEVICE — SUTURE ABSORBABLE BRAIDED 2-0 CP2 27 IN UD VICRYL + VCP869H

## (undated) DEVICE — DRAPE EQUIP C ARM 74X42 IN MOB XR W/ TIE RUBBER BND LF

## (undated) DEVICE — DRESSING,GAUZE,XEROFORM,CURAD,1"X8",ST: Brand: CURAD

## (undated) DEVICE — INTENDED FOR TISSUE SEPARATION, AND OTHER PROCEDURES THAT REQUIRE A SHARP SURGICAL BLADE TO PUNCTURE OR CUT.: Brand: BARD-PARKER ® CARBON RIB-BACK BLADES

## (undated) DEVICE — GOWN,PREVENTION PLUS,XLN/XL,ST,24/CS: Brand: MEDLINE